# Patient Record
Sex: MALE | Race: WHITE | Employment: OTHER | URBAN - METROPOLITAN AREA
[De-identification: names, ages, dates, MRNs, and addresses within clinical notes are randomized per-mention and may not be internally consistent; named-entity substitution may affect disease eponyms.]

---

## 2018-04-29 ENCOUNTER — APPOINTMENT (OUTPATIENT)
Dept: CT IMAGING | Facility: CLINIC | Age: 83
DRG: 065 | End: 2018-04-29
Attending: EMERGENCY MEDICINE
Payer: MEDICARE

## 2018-04-29 ENCOUNTER — HOSPITAL ENCOUNTER (INPATIENT)
Facility: CLINIC | Age: 83
LOS: 4 days | Discharge: NURSING, REHAB OR LONG-TERM CARE | DRG: 065 | End: 2018-05-03
Attending: EMERGENCY MEDICINE | Admitting: INTERNAL MEDICINE
Payer: MEDICARE

## 2018-04-29 DIAGNOSIS — K59.01 SLOW TRANSIT CONSTIPATION: Primary | ICD-10-CM

## 2018-04-29 DIAGNOSIS — I63.412 CEREBRAL INFARCTION DUE TO EMBOLISM OF LEFT MIDDLE CEREBRAL ARTERY (H): ICD-10-CM

## 2018-04-29 PROBLEM — I63.9 ACUTE EMBOLIC STROKE (H): Status: ACTIVE | Noted: 2018-04-29

## 2018-04-29 LAB
ANION GAP SERPL CALCULATED.3IONS-SCNC: 7 MMOL/L (ref 3–14)
APTT PPP: 31 SEC (ref 22–37)
BASOPHILS # BLD AUTO: 0 10E9/L (ref 0–0.2)
BASOPHILS NFR BLD AUTO: 0.4 %
BUN SERPL-MCNC: 21 MG/DL (ref 7–30)
CALCIUM SERPL-MCNC: 8.9 MG/DL (ref 8.5–10.1)
CHLORIDE SERPL-SCNC: 107 MMOL/L (ref 94–109)
CO2 SERPL-SCNC: 27 MMOL/L (ref 20–32)
CREAT SERPL-MCNC: 0.95 MG/DL (ref 0.66–1.25)
DIFFERENTIAL METHOD BLD: NORMAL
EOSINOPHIL # BLD AUTO: 0.2 10E9/L (ref 0–0.7)
EOSINOPHIL NFR BLD AUTO: 3.3 %
ERYTHROCYTE [DISTWIDTH] IN BLOOD BY AUTOMATED COUNT: 14 % (ref 10–15)
GFR SERPL CREATININE-BSD FRML MDRD: 74 ML/MIN/1.7M2
GLUCOSE SERPL-MCNC: 120 MG/DL (ref 70–99)
HBA1C MFR BLD: 7 % (ref 0–6.4)
HCT VFR BLD AUTO: 43.9 % (ref 40–53)
HGB BLD-MCNC: 15.4 G/DL (ref 13.3–17.7)
IMM GRANULOCYTES # BLD: 0 10E9/L (ref 0–0.4)
IMM GRANULOCYTES NFR BLD: 0.1 %
INR PPP: 1.03 (ref 0.86–1.14)
LYMPHOCYTES # BLD AUTO: 2.4 10E9/L (ref 0.8–5.3)
LYMPHOCYTES NFR BLD AUTO: 33.9 %
MCH RBC QN AUTO: 31.4 PG (ref 26.5–33)
MCHC RBC AUTO-ENTMCNC: 35.1 G/DL (ref 31.5–36.5)
MCV RBC AUTO: 89 FL (ref 78–100)
MONOCYTES # BLD AUTO: 0.7 10E9/L (ref 0–1.3)
MONOCYTES NFR BLD AUTO: 9.4 %
NEUTROPHILS # BLD AUTO: 3.7 10E9/L (ref 1.6–8.3)
NEUTROPHILS NFR BLD AUTO: 52.9 %
NRBC # BLD AUTO: 0 10*3/UL
NRBC BLD AUTO-RTO: 0 /100
PLATELET # BLD AUTO: 162 10E9/L (ref 150–450)
POTASSIUM SERPL-SCNC: 4.4 MMOL/L (ref 3.4–5.3)
RBC # BLD AUTO: 4.91 10E12/L (ref 4.4–5.9)
SODIUM SERPL-SCNC: 141 MMOL/L (ref 133–144)
TSH SERPL DL<=0.005 MIU/L-ACNC: 2.33 MU/L (ref 0.4–4)
WBC # BLD AUTO: 7 10E9/L (ref 4–11)

## 2018-04-29 PROCEDURE — 83036 HEMOGLOBIN GLYCOSYLATED A1C: CPT | Performed by: EMERGENCY MEDICINE

## 2018-04-29 PROCEDURE — 85025 COMPLETE CBC W/AUTO DIFF WBC: CPT | Performed by: EMERGENCY MEDICINE

## 2018-04-29 PROCEDURE — 80048 BASIC METABOLIC PNL TOTAL CA: CPT | Performed by: EMERGENCY MEDICINE

## 2018-04-29 PROCEDURE — 25000128 H RX IP 250 OP 636: Performed by: INTERNAL MEDICINE

## 2018-04-29 PROCEDURE — 99223 1ST HOSP IP/OBS HIGH 75: CPT | Mod: AI | Performed by: INTERNAL MEDICINE

## 2018-04-29 PROCEDURE — 84443 ASSAY THYROID STIM HORMONE: CPT | Performed by: EMERGENCY MEDICINE

## 2018-04-29 PROCEDURE — 25000132 ZZH RX MED GY IP 250 OP 250 PS 637: Performed by: EMERGENCY MEDICINE

## 2018-04-29 PROCEDURE — 96360 HYDRATION IV INFUSION INIT: CPT | Mod: 59

## 2018-04-29 PROCEDURE — 99292 CRITICAL CARE ADDL 30 MIN: CPT

## 2018-04-29 PROCEDURE — 93005 ELECTROCARDIOGRAM TRACING: CPT

## 2018-04-29 PROCEDURE — 25000128 H RX IP 250 OP 636: Performed by: EMERGENCY MEDICINE

## 2018-04-29 PROCEDURE — 70450 CT HEAD/BRAIN W/O DYE: CPT

## 2018-04-29 PROCEDURE — 85610 PROTHROMBIN TIME: CPT | Performed by: EMERGENCY MEDICINE

## 2018-04-29 PROCEDURE — 25000125 ZZHC RX 250: Performed by: EMERGENCY MEDICINE

## 2018-04-29 PROCEDURE — 70460 CT HEAD/BRAIN W/DYE: CPT

## 2018-04-29 PROCEDURE — 99291 CRITICAL CARE FIRST HOUR: CPT | Mod: 25

## 2018-04-29 PROCEDURE — 70498 CT ANGIOGRAPHY NECK: CPT

## 2018-04-29 PROCEDURE — 12000000 ZZH R&B MED SURG/OB

## 2018-04-29 PROCEDURE — 85730 THROMBOPLASTIN TIME PARTIAL: CPT | Performed by: EMERGENCY MEDICINE

## 2018-04-29 PROCEDURE — 96361 HYDRATE IV INFUSION ADD-ON: CPT

## 2018-04-29 RX ORDER — AMOXICILLIN 250 MG
2 CAPSULE ORAL 2 TIMES DAILY PRN
Status: DISCONTINUED | OUTPATIENT
Start: 2018-04-29 | End: 2018-05-03 | Stop reason: HOSPADM

## 2018-04-29 RX ORDER — POTASSIUM CHLORIDE 7.45 MG/ML
10 INJECTION INTRAVENOUS
Status: DISCONTINUED | OUTPATIENT
Start: 2018-04-29 | End: 2018-05-03 | Stop reason: HOSPADM

## 2018-04-29 RX ORDER — ACETAMINOPHEN 325 MG/1
650 TABLET ORAL EVERY 4 HOURS PRN
Status: DISCONTINUED | OUTPATIENT
Start: 2018-04-29 | End: 2018-05-03 | Stop reason: HOSPADM

## 2018-04-29 RX ORDER — LIDOCAINE 40 MG/G
CREAM TOPICAL
Status: DISCONTINUED | OUTPATIENT
Start: 2018-04-29 | End: 2018-05-03 | Stop reason: HOSPADM

## 2018-04-29 RX ORDER — POLYETHYLENE GLYCOL 3350 17 G/17G
17 POWDER, FOR SOLUTION ORAL DAILY PRN
Status: DISCONTINUED | OUTPATIENT
Start: 2018-04-29 | End: 2018-05-03 | Stop reason: HOSPADM

## 2018-04-29 RX ORDER — ASPIRIN 300 MG/1
300 SUPPOSITORY RECTAL ONCE
Status: COMPLETED | OUTPATIENT
Start: 2018-04-29 | End: 2018-04-29

## 2018-04-29 RX ORDER — ASPIRIN 300 MG/1
300 SUPPOSITORY RECTAL DAILY
Status: DISCONTINUED | OUTPATIENT
Start: 2018-04-29 | End: 2018-05-03 | Stop reason: HOSPADM

## 2018-04-29 RX ORDER — ONDANSETRON 2 MG/ML
4 INJECTION INTRAMUSCULAR; INTRAVENOUS EVERY 6 HOURS PRN
Status: DISCONTINUED | OUTPATIENT
Start: 2018-04-29 | End: 2018-05-03 | Stop reason: HOSPADM

## 2018-04-29 RX ORDER — METOPROLOL TARTRATE 1 MG/ML
5 INJECTION, SOLUTION INTRAVENOUS EVERY 4 HOURS PRN
Status: DISCONTINUED | OUTPATIENT
Start: 2018-04-29 | End: 2018-05-03 | Stop reason: HOSPADM

## 2018-04-29 RX ORDER — POTASSIUM CL/LIDO/0.9 % NACL 10MEQ/0.1L
10 INTRAVENOUS SOLUTION, PIGGYBACK (ML) INTRAVENOUS
Status: DISCONTINUED | OUTPATIENT
Start: 2018-04-29 | End: 2018-05-03 | Stop reason: HOSPADM

## 2018-04-29 RX ORDER — PROCHLORPERAZINE 25 MG
12.5 SUPPOSITORY, RECTAL RECTAL EVERY 12 HOURS PRN
Status: DISCONTINUED | OUTPATIENT
Start: 2018-04-29 | End: 2018-05-03 | Stop reason: HOSPADM

## 2018-04-29 RX ORDER — MAGNESIUM SULFATE HEPTAHYDRATE 40 MG/ML
4 INJECTION, SOLUTION INTRAVENOUS EVERY 4 HOURS PRN
Status: DISCONTINUED | OUTPATIENT
Start: 2018-04-29 | End: 2018-05-03 | Stop reason: HOSPADM

## 2018-04-29 RX ORDER — PROCHLORPERAZINE MALEATE 5 MG
5 TABLET ORAL EVERY 6 HOURS PRN
Status: DISCONTINUED | OUTPATIENT
Start: 2018-04-29 | End: 2018-05-03 | Stop reason: HOSPADM

## 2018-04-29 RX ORDER — ONDANSETRON 4 MG/1
4 TABLET, ORALLY DISINTEGRATING ORAL EVERY 6 HOURS PRN
Status: DISCONTINUED | OUTPATIENT
Start: 2018-04-29 | End: 2018-05-03 | Stop reason: HOSPADM

## 2018-04-29 RX ORDER — METOPROLOL TARTRATE 1 MG/ML
2.5 INJECTION, SOLUTION INTRAVENOUS EVERY 6 HOURS
Status: DISCONTINUED | OUTPATIENT
Start: 2018-04-30 | End: 2018-05-01

## 2018-04-29 RX ORDER — SODIUM CHLORIDE 9 MG/ML
INJECTION, SOLUTION INTRAVENOUS CONTINUOUS
Status: DISCONTINUED | OUTPATIENT
Start: 2018-04-29 | End: 2018-05-03 | Stop reason: HOSPADM

## 2018-04-29 RX ORDER — AMOXICILLIN 250 MG
1 CAPSULE ORAL 2 TIMES DAILY PRN
Status: DISCONTINUED | OUTPATIENT
Start: 2018-04-29 | End: 2018-05-03 | Stop reason: HOSPADM

## 2018-04-29 RX ORDER — HYDRALAZINE HYDROCHLORIDE 20 MG/ML
10-20 INJECTION INTRAMUSCULAR; INTRAVENOUS
Status: DISCONTINUED | OUTPATIENT
Start: 2018-04-29 | End: 2018-05-03 | Stop reason: HOSPADM

## 2018-04-29 RX ORDER — POTASSIUM CHLORIDE 1.5 G/1.58G
20-40 POWDER, FOR SOLUTION ORAL
Status: DISCONTINUED | OUTPATIENT
Start: 2018-04-29 | End: 2018-05-03 | Stop reason: HOSPADM

## 2018-04-29 RX ORDER — NALOXONE HYDROCHLORIDE 0.4 MG/ML
.1-.4 INJECTION, SOLUTION INTRAMUSCULAR; INTRAVENOUS; SUBCUTANEOUS
Status: DISCONTINUED | OUTPATIENT
Start: 2018-04-29 | End: 2018-05-03 | Stop reason: HOSPADM

## 2018-04-29 RX ORDER — LABETALOL HYDROCHLORIDE 5 MG/ML
10-40 INJECTION, SOLUTION INTRAVENOUS EVERY 10 MIN PRN
Status: DISCONTINUED | OUTPATIENT
Start: 2018-04-29 | End: 2018-05-03 | Stop reason: HOSPADM

## 2018-04-29 RX ORDER — POTASSIUM CHLORIDE 29.8 MG/ML
20 INJECTION INTRAVENOUS
Status: DISCONTINUED | OUTPATIENT
Start: 2018-04-29 | End: 2018-05-03 | Stop reason: HOSPADM

## 2018-04-29 RX ORDER — POTASSIUM CHLORIDE 1500 MG/1
20-40 TABLET, EXTENDED RELEASE ORAL
Status: DISCONTINUED | OUTPATIENT
Start: 2018-04-29 | End: 2018-05-03 | Stop reason: HOSPADM

## 2018-04-29 RX ORDER — IOPAMIDOL 755 MG/ML
120 INJECTION, SOLUTION INTRAVASCULAR ONCE
Status: COMPLETED | OUTPATIENT
Start: 2018-04-29 | End: 2018-04-29

## 2018-04-29 RX ORDER — BISACODYL 10 MG
10 SUPPOSITORY, RECTAL RECTAL DAILY PRN
Status: DISCONTINUED | OUTPATIENT
Start: 2018-04-29 | End: 2018-05-03 | Stop reason: HOSPADM

## 2018-04-29 RX ADMIN — SODIUM CHLORIDE 1000 ML: 9 INJECTION, SOLUTION INTRAVENOUS at 18:26

## 2018-04-29 RX ADMIN — IOPAMIDOL 120 ML: 755 INJECTION, SOLUTION INTRAVENOUS at 18:40

## 2018-04-29 RX ADMIN — SODIUM CHLORIDE: 9 INJECTION, SOLUTION INTRAVENOUS at 21:41

## 2018-04-29 RX ADMIN — ASPIRIN 300 MG: 300 SUPPOSITORY RECTAL at 19:41

## 2018-04-29 RX ADMIN — SODIUM CHLORIDE 100 ML: 9 INJECTION, SOLUTION INTRAVENOUS at 18:40

## 2018-04-29 ASSESSMENT — VISUAL ACUITY: OU: NORMAL ACUITY

## 2018-04-29 NOTE — ED NOTES
Bed: [facility]  Expected date:   Expected time:   Means of arrival:   Comments:  Kely 1 Code stroke 89 male

## 2018-04-29 NOTE — ED PROVIDER NOTES
"  History     Chief Complaint:  Stroke Symptoms    HPI - History is limited due to patient's mental status and is supplemented with EMS report and family    Angelito Castellanos is a 89 year old anticoagulated male with a pacemaker who presents with stroke symptoms. The patient was last seen well at 11:00 today. This afternoon, the patient's daughter's boyfriend found the patient in a chair with left sided weakness, slurred speech, facial droop, and a drop in mental status. EMS was contacted and reports a sugar of 101, systolic of 157 and urine incontinence. It is unknown if he fell and EMS denies signs of trauma. Here in the ED the patient complains of headache as well as his continued stroke symptoms. He states that he is normally seen at [facility]. At baseline, the patient is active with no deficits. He denies prior history of stroke.     Allergies:  No Known Drug Allergies    Medications:    EMS reports anticoagulant    Past Medical History:    Atrial fibrillation  Hard of hearing    Past Surgical History:    Pacemaker  Appendectomy    Family History:    The patient denies any relevant family medical history.    Social History:  The patient was accompanied to the ED by EMS and family at bedside  Smoking Status: former smoker  Smokeless Tobacco: none  Alcohol Use: yes    Review of Systems   Unable to perform ROS: Mental status change     Physical Exam   First Vitals:  BP: (!) 178/110  Pulse: 94  Heart Rate: 90  Temp: 98.3  F (36.8  C)  Resp: 18  Height: 177.8 cm (5' 10\")  Weight: 71.4 kg (157 lb 6.5 oz)  SpO2: 97 %      Physical Exam  GENERAL: well developed, pleasant   HEAD: atraumatic  EYES: pupils reactive, extraocular muscles intact, conjunctivae normal  ENT:  mucus membranes moist  NECK:  trachea midline, normal range of motion  RESPIRATORY: no tachypnea, breath sounds clear to auscultation   CVS: normal S1/S2, no murmurs, intact distal pulses  ABDOMEN: soft, nontender, nondistention  MUSCULOSKELETAL: no " deformities  SKIN: warm and dry, no acute rashes or ulceration  NEURO: GCS 15, loss of nasolabial fold on the left, alert and oriented x2. Paralysis left arm. Slurred speech. Right gaze preference. Stiffness to the right arm. Incontinent of Urine. No seizure activity.  PSYCH:  Mood/affect normal        Emergency Department Course   Imaging:  Radiographic findings were communicated with the patient who voiced understanding of the findings.  CT Head without contrast:   1. Focal area of gliosis in the right frontal operculum with loss of  gray-white matter differentiation. This has the appearance of a more  chronic infarct although an acute area of ischemia cannot be excluded.  This could be better evaluated with MRI if the patient is able.  2. Lacunar infarct within the left basal ganglia which may be acute or  chronic.  3. Diffuse parenchymal volume loss and white matter changes likely due  to chronic microvascular ischemic disease. As per radiology.    CT Head with contrast:   Large area of ischemia involving the right MCA territory  also with marked decreased cerebral blood volume suggesting core  infarct involving at least greater than 50% of the area of ischemia. As per radiology.      CTA Angiogram Head Neck:  1. Focal vascular cutoff of the proximal right M1 segment concerning  for embolism. Relatively poor collaterization of the distal right MCA  vessels particularly anteriorly.  2. Atherosclerotic disease at the right carotid bifurcation resulting  in 52% stenosis by NASCET criteria. Mild atherosclerotic disease at  the left carotid bifurcation without significant stenosis.  3. Patent arteries in the neck without evidence of dissection. Mild  stenosis at the origin of the left subclavian artery.  4. 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 As per radiology.     Laboratory:  BMP: Glucose 120 (H), o/w WNL (Creatinine: 0.95)  CBC: AWNL. (WBC 7.0, HGB 15.4, )    INR: 1.03  Partial thromboplastin time: 31    Interventions:  1826 NS, 1 L, IV   1941 aspirin suppository, 300 mg, Rectal    Emergency Department Course:  Nursing notes and vitals reviewed. I performed an exam of the patient as documented above.     IV inserted. Medicine administered as documented above. Blood drawn. This was sent to the lab for further testing, results above.    The patient was sent for CT head and angiogram while in the emergency department, findings above.     1820  EMS gives report  1821  Interview begins  1822  I begin my physical exam  1824  I attempt electronic chart review  1826  BP recorded at 178/110  1827  patient is taken to CT    1827  I spoke with Dr. Daniel Kennedy, Neurology, regarding the patient and their presentation today.   1841  I spoke with radiology regarding the CT results  1843  I spoke with family and Dr. Kennedy via tele stroke in the stabilization room.  1844  I spoke with radiology  1847  The patient returns to stabilization room, Dr. Kennedy begins his assessment.   1901  I spoke with Dr. Kennedy, neurology regarding the patient    1924  I consulted with Dr. Zaidi of the hospitalist services. They are in agreement to accept the patient for admission.    Findings and plan explained to the Patient who consents to admission. Discussed the patient with Dr. Zaidi, who will admit the patient to an inpatient neuro bed for further monitoring, evaluation, and treatment.    Impression & Plan          CMS Diagnoses: The patient has stroke symptoms:           ED Stroke specific documentation           NIHSS PDF          Protocol PDF     Patient last known well time: 1100  ED Provider first to bedside at: 1823  CT Results received at: 1900  Patient was not treated with TPA due to the following reason(s):  Out of the treatment time window    National Institutes of Health Stroke Scale (Baseline)  Time Performed: 1825      Score    Level of consciousness: (0)   Alert, keenly  responsive    LOC questions: (0)   Answers both questions correctly    LOC commands: (0)   Performs both tasks correctly    Best gaze: (1)   Partial gaze palsy    Visual: (0)   No visual loss    Facial palsy: (1)   Minor paralysis (flat nasolabial fold, smile asymmetry)    Motor arm (left): (2)   Some effort against gravity    Motor arm (right): (0)   No drift    Motor leg (left): (2)   Some effort against gravity    Motor leg (right): (1)   Drift    Limb ataxia: (1)   Present in one limb    Sensory: (0)   Normal- no sensory loss    Best language: (1)   Mild to moderate aphasia    Dysarthria: (1)   Mild to moderate dysarthria    Extinction and inattention: (1)   Visual, tacile, auditory, spatial, person inattention        Total Score:  11        Stroke Mimics were considered (including migraine headache, seizure disorder, hypoglycemia (or hyperglycemia), head or spinal trauma, CNS infection, Toxin ingestion and shock state (e.g. sepsis) .      Medical Decision Making:  The patient presents with stroke symptoms. Code stroke was called. The patient's neuro exam shows right gaze preference, initially complete paralysis of the left arm, now weakness but able to get it off the gurney, slurred speech and facial asymmetry. I spoke with the neurologist, the area of infarct is quite large and is already showing changes on CT so the decision was made that the patient was not a candidate for IV TPA given length of time since his symptoms as well as not an interventional candidate given the area of changes already seen on CT. The patient was given a rectal aspirin. The patient is showing gradual improvement of symptoms.  Given advanced age, changes already being seen on CT, large area affected already, and patient is starting to have improvements of symptoms, TPA and IR were not deployed, as the risk to benefit ratio was felt to be too high.    Critical Care time:  was 55 minutes for this patient excluding  procedures.    Diagnosis:    ICD-10-CM    1. 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) I63.412 Hemoglobin A1c     Hemoglobin A1c     TSH with free T4 reflex     TSH with free T4 reflex     CANCELED: Hemoglobin A1c     CANCELED: TSH with free T4 reflex       Disposition:  Admitted to the care of Dr. Zaidi and colleagues for further evaluation and treatment.     Terence PATINO, am serving as a scribe on 4/29/2018 at 6:39 PM to personally document services performed by Ronnie Barajas MD based on my observations and the provider's statements to me.       Terence Mckinney  4/29/2018    EMERGENCY DEPARTMENT       Ronnie Barajas MD  04/29/18 [id number]

## 2018-04-29 NOTE — CONSULTS
[facility]      Stroke Code Note    Angelito Castellanos is a 89 year old male.    A stroke code was activated due to left sided weakness and forced gaze deviation.  .    Stroke Code Data  Time notified 1823   Time of first response 1827   Time tele service began 1832   Time tele service ended 1859   Onset of symptoms unknown   Last known normal 1100   Time head CT read by me 1859   Was stroke code de-escalated? No       Telestroke Service Details  Type of service telemedicine diagnostic assessment of acute neurological changes   Reason telemedicine is appropriate patient required immediate assistance from specialist   Mode of transmission secure interactive audio and video communication per Alyce   Originating site (patient location) [facility]    Distant site (provider location) [facility]       TPA Treatment  Not given due to outside the time window.    Stroke Scales      National Institutes of Health Stroke Scale (at presentation)   NIHSS done at:  time patient seen      Score    Level of consciousness:  (0)   Alert, keenly responsive    LOC questions:  (0)   Answers both questions correctly    LOC commands:  (1)   Performs one task correctly    Best gaze:  (2)   Forced deviation    Visual:  (2)   Complete hemianopia    Facial palsy:  (2)   Partial paralysis (total/near total of lower face)    Motor arm (left):  (2)   Some effort against gravity    Motor arm (right):  (0)   No drift    Motor leg (left):  (2)   Some effort against gravity    Motor leg (right):  (2)   Some effort against gravity    Limb ataxia:  (0)   Absent    Sensory:  (0)   Normal- no sensory loss    Best language:  (0)   Normal- no aphasia    Dysarthria:  (1)   Mild to moderate dysarthria    Extinction and inattention:  (2)   Profound estefani-inattention / extinction > one modality        NIHSS Total Score:  16        Impression:  89-year-old man presenting to the hospital via EMS from home.  He was last  seen normal between 1030 and 11 AM this morning after breakfast.  He was then found this evening down in his room with an inability to use his left side with a forced right gaze deviation.  There was suspicion for stroke and a stroke code was called.  He was taken to the CT scanner where a CTA/CT/CTP was performed.  This demonstrated a right M1 cutoff and matched perfusion deficit within the right hemisphere.  There was some area of penumbra, however the area of matched deficit had a malignant profile.  Cerebral angiogram with thrombectomy was not offered based upon the imaging findings.  The patient started to improve on reexamination with the ability to cross midline with his eyes and was able then to lift up his left arm and left hand was more dexterous.  He was found to have a 70% stenosis of his right internal carotid artery.  He does have an history of atrial fibrillation and is not on anticoagulation.  He has a pacemaker which would preclude an MRI.  Given the improvement in his physical exam I would anticipate that there has been some recanalization of the previously seen lesion.    Recommendations  Acute Ischemic Stroke (without tPA) Recommendations  - Neurochecks  - Permissive HTN; labetalol PRN for SBP > 220  - Euthermia, Euglycemia  - Daily aspirin for secondary stroke prevention  - Statin  - TTE with Bubble Study  - Telemetry, EKG  - Bedside Glucose Monitoring  - A1c, Lipid Panel, Troponin x 3  - PT/OT/SLP  - Stroke Education      Daniel Kennedy MD  Vascular Neurology/Neurocritical Care  Text Page - [phone]    I spent 40 minutes of critical care time supervising the patient's code stroke activation.   I personally reviewed all relevant labs and neuroimaging.

## 2018-04-30 ENCOUNTER — APPOINTMENT (OUTPATIENT)
Dept: SPEECH THERAPY | Facility: CLINIC | Age: 83
DRG: 065 | End: 2018-04-30
Payer: MEDICARE

## 2018-04-30 ENCOUNTER — APPOINTMENT (OUTPATIENT)
Dept: CARDIOLOGY | Facility: CLINIC | Age: 83
DRG: 065 | End: 2018-04-30
Attending: INTERNAL MEDICINE
Payer: MEDICARE

## 2018-04-30 ENCOUNTER — APPOINTMENT (OUTPATIENT)
Dept: SPEECH THERAPY | Facility: CLINIC | Age: 83
DRG: 065 | End: 2018-04-30
Attending: INTERNAL MEDICINE
Payer: MEDICARE

## 2018-04-30 ENCOUNTER — APPOINTMENT (OUTPATIENT)
Dept: OCCUPATIONAL THERAPY | Facility: CLINIC | Age: 83
DRG: 065 | End: 2018-04-30
Attending: INTERNAL MEDICINE
Payer: MEDICARE

## 2018-04-30 ENCOUNTER — APPOINTMENT (OUTPATIENT)
Dept: CT IMAGING | Facility: CLINIC | Age: 83
DRG: 065 | End: 2018-04-30
Attending: PSYCHIATRY & NEUROLOGY
Payer: MEDICARE

## 2018-04-30 LAB
ANION GAP SERPL CALCULATED.3IONS-SCNC: 7 MMOL/L (ref 3–14)
BUN SERPL-MCNC: 17 MG/DL (ref 7–30)
CALCIUM SERPL-MCNC: 8.2 MG/DL (ref 8.5–10.1)
CHLORIDE SERPL-SCNC: 109 MMOL/L (ref 94–109)
CHOLEST SERPL-MCNC: 174 MG/DL
CO2 SERPL-SCNC: 26 MMOL/L (ref 20–32)
CREAT SERPL-MCNC: 1.05 MG/DL (ref 0.66–1.25)
ERYTHROCYTE [DISTWIDTH] IN BLOOD BY AUTOMATED COUNT: 14.1 % (ref 10–15)
GFR SERPL CREATININE-BSD FRML MDRD: 66 ML/MIN/1.7M2
GLUCOSE BLDC GLUCOMTR-MCNC: 119 MG/DL (ref 70–99)
GLUCOSE SERPL-MCNC: 121 MG/DL (ref 70–99)
HCT VFR BLD AUTO: 41.6 % (ref 40–53)
HDLC SERPL-MCNC: 35 MG/DL
HGB BLD-MCNC: 14.4 G/DL (ref 13.3–17.7)
INTERPRETATION ECG - MUSE: NORMAL
LDLC SERPL CALC-MCNC: 116 MG/DL
MCH RBC QN AUTO: 30.8 PG (ref 26.5–33)
MCHC RBC AUTO-ENTMCNC: 34.6 G/DL (ref 31.5–36.5)
MCV RBC AUTO: 89 FL (ref 78–100)
NONHDLC SERPL-MCNC: 139 MG/DL
PLATELET # BLD AUTO: 154 10E9/L (ref 150–450)
POTASSIUM SERPL-SCNC: 4 MMOL/L (ref 3.4–5.3)
RBC # BLD AUTO: 4.68 10E12/L (ref 4.4–5.9)
SODIUM SERPL-SCNC: 142 MMOL/L (ref 133–144)
TRIGL SERPL-MCNC: 116 MG/DL
TROPONIN I SERPL-MCNC: <0.015 UG/L (ref 0–0.04)
WBC # BLD AUTO: 7.7 10E9/L (ref 4–11)

## 2018-04-30 PROCEDURE — 93306 TTE W/DOPPLER COMPLETE: CPT | Mod: 26 | Performed by: INTERNAL MEDICINE

## 2018-04-30 PROCEDURE — 99233 SBSQ HOSP IP/OBS HIGH 50: CPT | Performed by: PSYCHIATRY & NEUROLOGY

## 2018-04-30 PROCEDURE — 70450 CT HEAD/BRAIN W/O DYE: CPT

## 2018-04-30 PROCEDURE — 92526 ORAL FUNCTION THERAPY: CPT | Mod: GN | Performed by: SPEECH-LANGUAGE PATHOLOGIST

## 2018-04-30 PROCEDURE — A9270 NON-COVERED ITEM OR SERVICE: HCPCS | Mod: GY | Performed by: INTERNAL MEDICINE

## 2018-04-30 PROCEDURE — 36415 COLL VENOUS BLD VENIPUNCTURE: CPT | Performed by: INTERNAL MEDICINE

## 2018-04-30 PROCEDURE — 25000128 H RX IP 250 OP 636: Performed by: INTERNAL MEDICINE

## 2018-04-30 PROCEDURE — 25000125 ZZHC RX 250: Performed by: INTERNAL MEDICINE

## 2018-04-30 PROCEDURE — 92610 EVALUATE SWALLOWING FUNCTION: CPT | Mod: GN | Performed by: SPEECH-LANGUAGE PATHOLOGIST

## 2018-04-30 PROCEDURE — 00000146 ZZHCL STATISTIC GLUCOSE BY METER IP

## 2018-04-30 PROCEDURE — 25000132 ZZH RX MED GY IP 250 OP 250 PS 637: Mod: GY | Performed by: INTERNAL MEDICINE

## 2018-04-30 PROCEDURE — 25500064 ZZH RX 255 OP 636: Performed by: INTERNAL MEDICINE

## 2018-04-30 PROCEDURE — 12000000 ZZH R&B MED SURG/OB

## 2018-04-30 PROCEDURE — 93306 TTE W/DOPPLER COMPLETE: CPT

## 2018-04-30 PROCEDURE — 80061 LIPID PANEL: CPT | Performed by: INTERNAL MEDICINE

## 2018-04-30 PROCEDURE — 80048 BASIC METABOLIC PNL TOTAL CA: CPT | Performed by: INTERNAL MEDICINE

## 2018-04-30 PROCEDURE — 40000133 ZZH STATISTIC OT WARD VISIT

## 2018-04-30 PROCEDURE — 85027 COMPLETE CBC AUTOMATED: CPT | Performed by: INTERNAL MEDICINE

## 2018-04-30 PROCEDURE — 40000225 ZZH STATISTIC SLP WARD VISIT: Performed by: SPEECH-LANGUAGE PATHOLOGIST

## 2018-04-30 PROCEDURE — 97530 THERAPEUTIC ACTIVITIES: CPT | Mod: GO

## 2018-04-30 PROCEDURE — 97165 OT EVAL LOW COMPLEX 30 MIN: CPT | Mod: GO

## 2018-04-30 PROCEDURE — 99232 SBSQ HOSP IP/OBS MODERATE 35: CPT | Performed by: INTERNAL MEDICINE

## 2018-04-30 PROCEDURE — 84484 ASSAY OF TROPONIN QUANT: CPT | Performed by: INTERNAL MEDICINE

## 2018-04-30 RX ADMIN — METOPROLOL TARTRATE 2.5 MG: 5 INJECTION INTRAVENOUS at 05:53

## 2018-04-30 RX ADMIN — SODIUM CHLORIDE: 9 INJECTION, SOLUTION INTRAVENOUS at 11:38

## 2018-04-30 RX ADMIN — HUMAN ALBUMIN MICROSPHERES AND PERFLUTREN 9 ML: 10; .22 INJECTION, SOLUTION INTRAVENOUS at 11:11

## 2018-04-30 RX ADMIN — ACETAMINOPHEN 650 MG: 325 TABLET ORAL at 20:39

## 2018-04-30 RX ADMIN — METOPROLOL TARTRATE 2.5 MG: 5 INJECTION INTRAVENOUS at 01:11

## 2018-04-30 RX ADMIN — METOPROLOL TARTRATE 2.5 MG: 5 INJECTION INTRAVENOUS at 18:36

## 2018-04-30 RX ADMIN — ASPIRIN 300 MG: 300 SUPPOSITORY RECTAL at 09:33

## 2018-04-30 RX ADMIN — METOPROLOL TARTRATE 2.5 MG: 5 INJECTION INTRAVENOUS at 12:43

## 2018-04-30 ASSESSMENT — ACTIVITIES OF DAILY LIVING (ADL)
ADLS_ACUITY_SCORE: 17
ADLS_ACUITY_SCORE: 17
ADLS_ACUITY_SCORE: 19
WHICH_OF_THE_ABOVE_FUNCTIONAL_RISKS_HAD_A_RECENT_ONSET_OR_CHANGE?: AMBULATION;TRANSFERRING;TOILETING;BATHING;DRESSING;EATING;SWALLOWING;COGNITION;COMMUNICATION/SPEECH;FALL HISTORY
ADLS_ACUITY_SCORE: 19
ADLS_ACUITY_SCORE: 17
ADLS_ACUITY_SCORE: 18

## 2018-04-30 ASSESSMENT — VISUAL ACUITY
OU: BASELINE

## 2018-04-30 NOTE — ED NOTES
Assumed care of pt. Report received from ALFIE Bowie. Pt incontinent of urine. Changed pt (total care) with assistance of 2 RN's.

## 2018-04-30 NOTE — PROVIDER NOTIFICATION
"Call placed to Dr. Mathis: \"Patient has an Adapta pacemaker which is not compatable with MRI per MRI tech [facility].  Orders have been D/C'd, thanks!\"  "

## 2018-04-30 NOTE — PROGRESS NOTES
04/30/18 0831   General Information   Onset Date 04/29/18   Start of Care Date 04/30/18   Referring Physician Dr. Gabriela Zaidi   Patient Profile Review/OT: Additional Occupational Profile Info See Profile for full history and prior level of function   Patient/Family Goals Statement Did not state.  Agreed to POC goal.   Swallowing Evaluation Bedside swallow evaluation   Behaviorial Observations Alert;Confused;Distractible;Initiation problems  (word retrieval delays, not following all commands)   Mode of current nutrition NPO   Respiratory Status O2 Supply   Type of O2 supply Nasal cannula  (2L)   Comments Per MD note: Angelito Castellanos is a 89 year old male with hx of chronic a-fib s/p PPM and not on anticoagulation who presents with slurred speech, left facial droop, and left sided weakness, and is being admitted for acute right MCA stroke.    Clinical Swallow Evaluation   Oral Musculature (not following consistently, ? oral apraxia)   Dentition present and adequate  (missing a few teeth)   Secretion Management (small amount of mucus on hard palate)   Oral Labial Strength and Mobility impaired retraction;impaired pursing  (mild deficits on left)   Lingual Strength and Mobility impaired protrusion  (slight pull to left)   Laryngeal Function Throat clear;Cough;Swallow;Voicing initiated;Dry swallow palpated  (inconsistent execution on command)   Clinical Swallow Eval: Thin Liquid Texture Trial   Mode of Presentation, Thin Liquids fed by clinician;spoon   Volume of Liquid or Food Presented ice chips x 3   Oral Phase of Swallow Premature pharyngeal entry   Pharyngeal Phase of Swallow reduction in laryngeal movement  (delay)   Diagnostic Statement decreased awareness, cues needed to swallow at times   Clinical Swallow Eval: Nectar Thick Liquid Texture Trial   Mode of Presentation, Nectar spoon   Volume of Nectar Presented tsp x 1   Oral Phase, Nectar Premature pharyngeal entry   Pharyngeal Phase, Nectar reduction in  laryngeal movement  (delay)   Diagnostic Statement decreased awareness, cues needed to swallow    Clinical Swallow Eval: Honey Thick Liquid Texture Trial   Mode of Presentation, Honey spoon   Volume of Honey Presented tsps x 5   Oral Phase, Honey Premature pharyngeal entry   Pharyngeal Phase, Honey reduction in laryngeal movement  (delay)   Diagnostic Statement decreased awareness, cues needed to swallow at times   Swallow Compensations   Swallow Compensations Pacing;Reduce amounts;Effortful swallow   Esophageal Phase of Swallow   Patient reports or presents with symptoms of esophageal dysphagia No   Swallow Eval: Clinical Impressions   Skilled Criteria for Therapy Intervention Skilled criteria met.  Treatment indicated.   Functional Assessment Scale (FAS) 2   Treatment Diagnosis moderate-severe oral-pharyngeal dysphagia   Diet texture recommendations NPO  (except crushed meds and 1-10 tsps of honey thick liquids)   Recommended Feeding/Eating Techniques (see below)   Therapy Frequency daily   Predicted Duration of Therapy Intervention (days/wks) 1 week   Anticipated Discharge Disposition inpatient rehabilitation facility   Risks and Benefits of Treatment have been explained. Yes   Patient, family and/or staff in agreement with Plan of Care Yes   Clinical Impression Comments Patient presents with moderate-severe oral-pharyngeal dysphagia characterized by delayed swallows with mild decreased elevation and apraxia/poor oral and swallow awareness.  Patient required cues to swallow due to poor awareness.  Unable to rule out silent aspiration at bedside.  Recommend NPO status except for the following with nursing supervision: crushed meds and 1-10 tsps of honey thick, verify/cue swallows, hold if aspiration signs observed.  Plan to continue Tx this pm/tomorrow am as able with ongoing assessment of swallow function and safety for increased po intake.   Total Evaluation Time   Total Evaluation Time (Minutes) 20

## 2018-04-30 NOTE — ED NOTES
"EKG being done. Pt awake and alert. Pt states \"I know I'm having a stroke\". Pt's wet pants removed.  "

## 2018-04-30 NOTE — PROVIDER NOTIFICATION
Called Dr. Zaidi to verify stepdown status, Dr. Zaidi said ok for neuros to be q4h, will change orders to reflect this.

## 2018-04-30 NOTE — PROGRESS NOTES
"   04/30/18 1006   Quick Adds   Type of Visit Initial Occupational Therapy Evaluation   Living Environment   Lives With child(jeanette), adult   Living Arrangements house   Home Accessibility grab bars present (bathtub);tub/shower is not walk in  (hasn't installed grab bar yet)   Number of Stairs to Enter Home 1   Number of Stairs Within Home 7   Living Environment Comment Lives with one daughter and other visits several times/week. Bedroom and bath on second level.    Functional Level Prior   Ambulation 0-->independent   Transferring 0-->independent   Toileting 0-->independent   Bathing 0-->independent   Dressing 0-->independent   Eating 0-->independent   Prior Functional Level Comment I with meds. Patient makes eggs, oatmeal for example otherwise heats up food.   General Information   Onset of Illness/Injury or Date of Surgery - Date 04/29/18   Referring Physician Dejuan   Patient/Family Goals Statement Go home.   Additional Occupational Profile Info/Pertinent History of Current Problem Admitted with L sided weakness and slurred speech. Dx with R MCA CVI.    Precautions/Limitations fall precautions   Cognitive Status Examination   Orientation person   Level of Consciousness alert   Able to Follow Commands mild impairment   Personal Safety (Cognitive) at risk behaviors demonstrated  (decreased insight into deficits)   Cognitive Comment Thought he was at [facility], knew \"next month is May\", + year, -day and date.   Visual Perception   Visual Perception Comments Able to read white board to L, items on R. Denies deficits. Difficult to assess due to [place], decreased command following at times.   Sensory Examination   Sensory Quick Adds No deficits were identified   Pain Assessment   Patient Currently in Pain No   Range of Motion (ROM)   ROM Comment R shoulder limited to ~90* flexion due to h/o injury. LUE WNL with AROM.   Strength   Strength Comments + drift LUE. Unable to assess strength as patient having difficulty following " the commands for MMT.    Mobility   Bed Mobility Comments MOD A   Transfer Skill: Bed to Chair/Chair to Bed   Level of [place]: Bed to Chair unable to perform   Transfer Skill: Sit to Stand   Level of [place]: Sit/Stand unable to perform   Balance   Balance Comments CG to MOD A EOB.    Lower Body Dressing   Level of [place]: Dress Lower Body dependent (less than 25% patients effort)   Activities of Daily Living Analysis   Impairments Contributing to Impaired Activities of Daily Living balance impaired;cognition impaired;strength decreased   General Therapy Interventions   Planned Therapy Interventions ADL retraining;cognition;transfer training   Clinical Impression   Criteria for Skilled Therapeutic Interventions Met yes, treatment indicated   OT Diagnosis Decreased ADL   Influenced by the following impairments ?L side inattention, balance, confusion.   Assessment of Occupational Performance 3-5 Performance Deficits   Identified Performance Deficits dressing, bathing, transfers, grooming, IADL   Clinical Decision Making (Complexity) Moderate complexity   Therapy Frequency daily   Predicted Duration of Therapy Intervention (days/wks) 1 week   Anticipated Discharge Disposition Acute Rehabilitation Facility   Risks and Benefits of Treatment have been explained. Yes   Patient, Family & other staff in agreement with plan of care Yes   Total Evaluation Time   Total Evaluation Time (Minutes) 15

## 2018-04-30 NOTE — PROGRESS NOTES
"Care Coordination:    Per therapy notes, ARU is recommended.  Referral was sent to ARU via [facility] on 4/30/18.  Provider notes from 4/29 anticipated discharge, \"2-3 days, anticipate ARU/TCU.\"      Chio Mercado RN, BSN  [facility] Care Coordinator   Mobile Phone: [phone]    "

## 2018-04-30 NOTE — PLAN OF CARE
Problem: Patient Care Overview  Goal: Plan of Care/Patient Progress Review  PT: PT orders received, chart reviewed, eval attempted. Pt admitted with a R MCA infarct. Busy with ECHO at this time.

## 2018-04-30 NOTE — PLAN OF CARE
Problem: Patient Care Overview  Goal: Plan of Care/Patient Progress Review    Discharge Planner SLP   Patient plan for discharge: Did not state  Current status: Swallow Tx with ongoing assessment was provided this pm.  Feeding assist and mod-max cues were given to swallow honey thick liquid trials with no overt signs of aspiration.  Poor awareness of oral function/swallow reflex, delayed swallows, and decreased elevation were observed.  Absent swallow noted to thin liquid trial.  Recommend caution with a honey thick clear liquid diet, 1:1 supervision/feeding assist and cues to use the following: sit at 90 degrees, liquids by spoon, verify/cue swallows, slow rate, crush meds and give with honey thick, hold po if aspiration signs are noted/respiratory status declines/unable to verify swallows.  Will continue swallow Tx with ongoing assessment on 5/1 as able.  Barriers to return to prior living situation: Level of assist, confusion  Recommendations for discharge: ARU with SLP services  Rationale for recommendations: SLP swallow Tx to maximize safety for a least restrictive diet; cognitive-linguistic eval and Tx       Entered by: Aliza Leos 04/30/2018 3:52 PM

## 2018-04-30 NOTE — H&P
"[facility]    History and Physical  Hospitalist       Date of Admission:  4/29/2018  Date of Service (when I saw the patient): 04/29/18    Assessment & Plan   Angelito Castellanos is a 89 year old male with hx of chronic a-fib s/p PPM and not on anticoagulation who presents with slurred speech, left facial droop, and left sided weakness, and is being admitted for acute right MCA stroke.     Acute right MCA embolic stroke  Has chronic atrial fibrillation and declined anticoagulation in the past. Initial head CT showed stroke involving the right frontal operculum as well as lacunar infarct within the left basal ganglia of unclear chronicity. CTA head/neck showed focal cutoff of the proximal right M1 segment concerning for embolism, atherosclerosis of the carotid arteries, 2mm aneurysm involving the right ICA, and mild stenosis of the left subclavian artery. Neurology did not feel patient was a candidate for thrombectomy. He continues to have mildly slurred speech, facial droop, and left-sided weakness, but overall this has improved compared to initial onset.   - Aspirin 325 PO/325 [place] daily for now  - Permissive hypertension, IV labetalol and hydralazine available for SBP>220  - Brain MRI ordered  - TTE/bubble study ordered  - Serial troponins, lipid panel, A1c, TSH ordered  - PT/OT/SLP  - Neurology following    Chronic atrial fibrillation s/p PPM  [PTA: metoprolol 25 mg BID] As noted above, he has declined anticoagulation in the past stating that he was offered \"rat poison.\"  - Hold metoprolol while NPO  - On metoprolol 2.5 mg IV q6h while NPO    History of pre-diabetes  - A1c ordered    FEN: NPO, NS at 75 ml/h  DVT Prophylaxis: Pneumatic Compression Devices  Code Status: DNR/DNI, discussed with patient and family    Disposition: Expected discharge once cleared by Neurology in the next 2-3 days, anticipate ARU/TCU    Gabriela Zaidi    Primary Care Physician   Gerardo Adhikari    Chief Complaint   Slurred " "speech, left facial droop, left-sided weakness    History is obtained from the patient, his family, and medical records    History of Present Illness   Angelito Castellanos is a 89 year old male with hx of chronic a-fib s/p PPM and not on anticoagulation who presents with slurred speech, left facial droop, and left sided weakness. History if obtained from the patient and his family who are at bedside. The patient lives with his daughter and her boyfriend. He was last seen normal around 10:30-11a this morning. His daughter left for a few hours, and when her boyfriend arrived at the house several hours later, he noticed that the patient was slumped over in a chair with apparent facial droop, left-sided weakness, and slurred speech. EMS was activated at that time.     In the ED, initial CT head showed evidence of stroke involving the right frontal operculum as well as lacunar infarct within the left basal ganglia of unclear chronicity. CTA head/neck showed focal cutoff of the proximal right M1 segment concerning for embolism, atherosclerosis of the carotid arteries. 2mm aneurysm involving the right ICA, and mild stenosis of the left subclavian artery. Code stroke was activated. Neurology did not feel a thrombectomy was warranted. The patient continues to have mildly slurred speech, facial droop, and left-sided weakness, but overall this has improved compared to initial onset. The patient offers no other concerns. He was at his usual state of health until today. He denies fevers/chills or recent illnesses. He denies cp/sob, dizziness/lightheadedness, or nausea. He ambulates independently, denies weakness, and denies recent falls. He has chronic atrial fibrillation and has previously declined anticoagulation because \"they wanted to give me rat poison.\"     Past Medical History    I have reviewed this patient's medical history and updated the medical record  Past Medical History:   Diagnosis Date     Chronic atrial fibrillation " (H)      [place] (hard of hearing)      Prediabetes        Past Surgical History   I have reviewed this patient's surgical history and updated the medical record  Past Surgical History:   Procedure Laterality Date     APPENDECTOMY       CARDIAC SURGERY      pacemaker     NASAL/SINUS POLYPECTOMY         Prior to Admission Medications   None     Allergies   No Known Allergies    Social History   Remote history of smoking. He binge drinks occasionally. He lives with his daughter and has otherwise been independent of all cares. He has been driving until this hospitalization    Family History   I have reviewed this patient's family history  Family History   Problem Relation Age of Onset     Coronary Artery Disease Father      MI in his 60s     CEREBROVASCULAR DISEASE Other      Multiple relatives with history of stroke       Review of Systems   The 10 point Review of Systems is negative other than noted in the HPI    Physical Exam   Temp: 98.3  F (36.8  C) Temp src: Temporal BP: 144/83 Pulse: 94 Heart Rate: 87 Resp: 10 SpO2: 95 % O2 Device: None (Room air)    Vital Signs with Ranges  Temp:  [98.3  F (36.8  C)] 98.3  F (36.8  C)  Pulse:  [94] 94  Heart Rate:  [] 87  Resp:  [10-26] 10  BP: (144-186)/() 144/83  SpO2:  [95 %-98 %] 95 %  157 lbs 6.54 oz    Constitutional: Appears comfortable, NAD  HEENT: NC/AT, sclera white, conjunctiva clear, EOMI, MMM  Respiratory: Breathing non-labored. Lungs CTAB - no wheezes/crackles/rhonchi  Cardiovascular: Heart RRR, no m/r/g. No pedal edema.   GI: +BS. Abd soft/NT  Lymph/Hematologic: No cervical LAD  Genitourinary: Not examined  Skin: Warm and dry. No rash.  Musculoskeletal: Normal muscle bulk and tone  Neurologic: Alert and appropriate. Mildly slurred speech and left facial droop. SIMPSON, but with apparent left-sided weakness  Psychiatric: Calm and cooperative    Data   Data reviewed today:  I personally reviewed the CT head showing stroke involving the right frontal operculum  as well as lacunar infarct within the left basal ganglia of unclear chronicity. CTA head/neck showed focal cutoff of the proximal right M1 segment concerning for embolism, atherosclerosis of the carotid arteries. 2mm aneurysm involving the right ICA, and mild stenosis of the left subclavian artery.    Recent Labs  Lab 04/29/18  1822   WBC 7.0   HGB 15.4   MCV 89      INR 1.03      POTASSIUM 4.4   CHLORIDE 107   CO2 27   BUN 21   CR 0.95   ANIONGAP 7   EDMUNDO 8.9   *       Recent Results (from the past 24 hour(s))   CT Head w/o Contrast    Narrative    CT SCAN OF THE HEAD WITHOUT CONTRAST   4/29/2018 6:34 PM     HISTORY: Code Stroke.      TECHNIQUE:  Axial images of the head and coronal reformations without  IV contrast material. Radiation dose for this scan was reduced using  automated exposure control, adjustment of the mA and/or kV according  to patient size, or iterative reconstruction technique.    COMPARISON: None.    FINDINGS: There is area of gliosis with gray-white matter  differentiation loss in the right frontal operculum that has a chronic  appearance although acute region of ischemia cannot be excluded. Focal  hypodensity in the left basal ganglia likely represents a lacunar  infarct which may be acute or chronic.    No evidence of acute intracranial hemorrhage. No mass effect or  midline shift. Mild diffuse parenchymal volume loss. Periventricular  white matter hypodensities are likely related to chronic microvascular  ischemic disease. Ventricular size within normal limits without  hydrocephalus.    Scattered mucosal thickening in the paranasal sinuses. The bony  calvarium and bones of the skull base appear intact.       Impression    IMPRESSION:  1. Focal area of gliosis in the right frontal operculum with loss of  gray-white matter differentiation. This has the appearance of a more  chronic infarct although an acute area of ischemia cannot be excluded.  This could be better evaluated  with MRI if the patient is able.  2. Lacunar infarct within the left basal ganglia which may be acute or  chronic.  3. Diffuse parenchymal volume loss and white matter changes likely due  to chronic microvascular ischemic disease.    Results discussed with Ronnie Barajas at 6:40 PM on 4/29/2018.      JUSTINE RIBERA MD   CTA Angiogram Head Neck    Narrative    CT ANGIOGRAM OF THE HEAD AND NECK WITH CONTRAST  4/29/2018 6:41 PM     HISTORY: Code Stroke;     TECHNIQUE:  CT angiography with an injection of 70 mL Isovue-370 IV  with scans through the head and neck.  Images were transferred to a  separate 3-D workstation where multiplanar reformations and 3-D images  were created.  Estimates of carotid stenoses are made relative to the  distal internal carotid artery diameters except as noted. Radiation  dose for this scan was reduced using automated exposure control,  adjustment of the mA and/or kV according to patient size, or iterative  reconstruction technique.    COMPARISON: None.     CT HEAD FINDINGS: No contrast enhancing lesions. Cerebral blood flow  is grossly normal.     CT ANGIOGRAM HEAD FINDINGS:  Focal vascular cutoff of the proximal  right M1 segment concerning for embolism. There is relatively poor  collateralization of the distal vessels particularly along the  anterior right MCA aspect.    No other vascular cutoff is appreciated of the proximal intracranial  arteries.  No significant stenosis.    Inferiorly directed 2 mm aneurysm at the supraclinoid right internal  carotid artery.    CT ANGIOGRAM NECK FINDINGS:   There is atherosclerotic disease at the aortic arch and origins of the  great vessels. This results in mild stenosis at the origin of the left  subclavian artery.     Right carotid artery: The right common and internal carotid arteries  are patent. Marked soft and calcified plaque at the carotid  bifurcation and proximal internal carotid artery resulting in  approximately 52% stenosis by NASCET  criteria.     Left carotid artery: The left common and internal carotid arteries are  patent. Mild soft and calcified plaque at the left carotid bifurcation  without significant stenosis by NASCET criteria.     Vertebral arteries: Vertebral arteries are patent without evidence of  dissection. No significant stenosis.     Other findings: Emphysematous changes in the visualized lung apices.  Pacer wire partially visualized. Multilevel degenerative changes in  the spine.      Impression    IMPRESSION:   1. Focal vascular cutoff of the proximal right M1 segment concerning  for embolism. Relatively poor collaterization of the distal right MCA  vessels particularly anteriorly.  2. Atherosclerotic disease at the right carotid bifurcation resulting  in 52% stenosis by NASCET criteria. Mild atherosclerotic disease at  the left carotid bifurcation without significant stenosis.  3. Patent arteries in the neck without evidence of dissection. Mild  stenosis at the origin of the left subclavian artery.  4. 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    Results of proximal right M1 cutoff discussed with Ronnie Barajas at 6:45  PM on 4/29/2018.     JUSTINE RIBERA MD   CT Head w Contrast    Narrative    CT BRAIN PERFUSION 4/29/2018 6:54 PM    HISTORY: Code Stroke.      TECHNIQUE: Time sequential axial CT images of the head were acquired  during the administration of 50 mL Isovue-370 IV. Color perfusion maps  of the brain were created from this time sequential axial source data.      Radiation dose for this scan was reduced using automated exposure  control, adjustment of the mA and/or kV according to patient size, or  iterative reconstruction technique.    COMPARISON: None.    FINDINGS: There is a large perfusion defect within the right MCA  territory with increased time to drain and Tmax involving almost the  entirety of the right MCA territory. There is corresponding decrease  in cerebral  blood volume and cerebral blood flow also in the right MCA  territory involving at least 50% of the abnormal area.      Impression    IMPRESSION: Large area of ischemia involving the right MCA territory  also with marked decreased cerebral blood volume suggesting core  infarct involving at least greater than 50% of the area of ischemia.     JUSTINE RIBERA MD

## 2018-04-30 NOTE — PLAN OF CARE
Problem: Patient Care Overview  Goal: Plan of Care/Patient Progress Review    Discharge Planner SLP   Patient plan for discharge: Did not state  Current status: A bedside swallow evaluation was completed this am.  Patient presents with moderate-severe oral-pharyngeal dysphagia characterized by delayed swallows with mild decreased elevation and apraxia/poor oral and swallow awareness.  Patient required cues to swallow due to poor awareness.  Unable to rule out silent aspiration at bedside.  Recommend NPO status except for the following with nursing supervision: crushed meds and 1-10 tsps of honey thick, verify/cue swallows, hold if aspiration signs observed.  Plan to continue Tx this pm/tomorrow am as able with ongoing assessment of swallow function and safety for increased po intake.  Barriers to return to prior living situation: Confusion, weakness  Recommendations for discharge: TCU vs ARU with SLP services  Rationale for recommendations: SLP swallow Tx to maximize safety for a least restrictive diet; cognitive-linguistic eval and Tx       Entered by: Aliza Leos 04/30/2018 8:26 AM

## 2018-04-30 NOTE — PLAN OF CARE
Problem: Patient Care Overview  Goal: Plan of Care/Patient Progress Review  Outcome: Improving  RN assumed care from 10p-11p after arrival to [facility]. Pt found slumped over in chair at home. Noted Lt sided facial; droop and weakness, confusion. Pt mentation improved upon arrival to [facility], was A/O. Per Pt's family  Pt has difficulty with word finding at baseline and reads lips. [place] but does not have hearing aides with.. NIH stroke scale preformed by Certified RN on [facility], see chart...  Pacemaker, Skin intact, Lt sided facial droop and weakness noted.   NPO.  VSS on 2L 02. Family assisted with admission profile. D/C  Pending. Bed rest at this time

## 2018-04-30 NOTE — PLAN OF CARE
Problem: Patient Care Overview  Goal: Plan of Care/Patient Progress Review  Outcome: No Change  Tele SR with PAC's. BP HTN with systolic in 150's. Other VS stable. Denies pain. Incontinent of urine. Pericare provided PRN. Skin intact. Neuro assessments completed. Pt is A&Ox4. Speech is hoarse and garbled. Pt is [place] and has word finding difficulty at baseline. Does read lips. Mild L facial droop and LUE/LLE weakness present. Forgetfulness noted. PT/OT/SLP ordered. Plan for Echo today. Continue to monitor.

## 2018-04-30 NOTE — ED NOTES
Report to Jamir JUDGE. Pt's symptoms improving. Right gaze preference improving. Left arm strength improved. Pt denies HA. Speech less slurred. Facial droop less pronounced.

## 2018-04-30 NOTE — ED NOTES
"[facility]  ED Nurse Handoff Report    ED Chief complaint: Neurologic Problem (Left-sided deficit and slurred speech. Found at home by daughter's boyfriend. Last known normal at 1030. Hx of A-Fib but not taking blood thinners. Per EMS pt normally is alert and active.)      ED Diagnosis:   Final diagnoses:   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)       Code Status: DNR / DNI    Allergies: No Known Allergies    Activity level - Baseline/Home:  Independent    Activity Level - Current:   Unable to Assess     Needed?: No    Isolation: No  Infection: Not Applicable    Bariatric?: No    Vital Signs:   Vitals:    04/29/18 1850 04/29/18 1851 04/29/18 1852 04/29/18 1857   BP:   (!) 149/102    Pulse:       Resp: 26 14 20    Temp:       TempSrc:       SpO2:   97%    Weight:       Height:    1.778 m (5' 10\")       Cardiac Rhythm: ,        Pain level:      Is this patient confused?: No, alert and oriented x 3, needs some reorientation to situation.    Patient Report: Initial Complaint: stroke symptoms  Focused Assessment: patient was found in his chair by daughters boyfriend. Last known well was 1030. He presented to the ed with left sided defecits, unable to move left arm or leg with right gaze and left sided facial droop. Code stroke was called. CT shows a large clot. Neurologist spoke with IR and no intervention was warranted at this time due to size of clot and patients age. TPA was not given. Patient did receive 300mg aspirin suppository. He has a history of a fib, not on blood thinners, and a cardiac pacemaker.     Per family patient is usually very active and independent with a sharp mentation and is known for frequently making jokes. He does not use a walker or a cane, but does keep a stick he picked up on a walk around \"in case I need a cane\"    Currently he is alert and oriented x 3, requires some reorientation to current situation. He has a left sided facial " droop. He is slightly contracted with left arm and weaker on left side, he now is moving left arm and hands. He is weak on the left leg. He denies any numbness or tingling to extremities or face. Tongue is midline. Able to shrug bilateral shoulders with left side slightly weaker. Pupils equal and reactive, denies headache. Gaze now wnl. Speech is hoarse, no aphasia   Tests Performed: blood, ct  Abnormal Results: large clot noted in right hemisphere of brain   Treatments provided: aspirin 300mg rectally, 1 L NS    Family Comments: here with multiple family members    OBS brochure/video discussed/provided to patient: No    ED Medications:   Medications   Saline flush (100 mLs Intravenous Given 4/29/18 1840)   iopamidol (ISOVUE-370) solution 120 mL (120 mLs Intravenous Given 4/29/18 1840)   0.9% sodium chloride BOLUS (1,000 mLs Intravenous New Bag 4/29/18 1826)   aspirin Suppository 300 mg (300 mg Rectal Given 4/29/18 1941)       Drips infusing?:  No    For the majority of the shift this patient was [place].   Interventions performed were frequent rounding.    Severe Sepsis OR Septic Shock Diagnosis Present: No      ED NURSE PHONE NUMBER: [id number]

## 2018-04-30 NOTE — PROGRESS NOTES
"[facility]    Hospitalist Progress Note    Assessment & Plan   Angelito Castellanos is a 89 year old male with hx of chronic a-fib s/p PPM and not on anticoagulation who presents with slurred speech, left facial droop, and left sided weakness, and is being admitted for acute right MCA stroke.      Acute right MCA embolic stroke  Has chronic atrial fibrillation and declined anticoagulation in the past. Initial head CT showed stroke involving the right frontal operculum as well as lacunar infarct within the left basal ganglia of unclear chronicity. CTA head/neck showed focal cutoff of the proximal right M1 segment concerning for embolism, atherosclerosis of the carotid arteries, 2mm aneurysm involving the right ICA, and mild stenosis of the left subclavian artery. Neurology did not feel patient was a candidate for thrombectomy. He continues to have mildly slurred speech, facial droop, and left-sided weakness, but overall this has improved compared to initial onset.   Of note, patient's pacemaker is not MRI compatible so unable to obtain.   - Aspirin 325 PO/325 CT daily for now  - Will continue permissive hypertension at this time but appears to have normal blood pressures without intervention.  IV labetalol and hydralazine available for SBP>220  - TTE/bubble study done and awaiting results   - PT/OT/SLP following  - Neurology following     Chronic atrial fibrillation s/p PPM  [PTA: metoprolol 25 mg BID] As noted above, he has declined anticoagulation in the past stating that he was offered \"rat poison.\"  - Hold metoprolol while NPO  - On metoprolol 2.5 mg IV q6h while NPO     History of pre-diabetes  HgbA1c is 7.0.  Patient has discussed this with his PCP regarding diet controlled and has elected to not due that given his age       # Pain Assessment:  Current Pain Score 4/30/2018   Patient currently in pain? denies   Angelito s pain level was assessed and he currently denies pain.        DVT Prophylaxis: " Pneumatic Compression Devices  Code Status: DNR/DNI    Disposition: Expected discharge in 1-2 days once work-up and therapy evaluations complete.  May benefit from TCU/ARU     Rudolph Mathis DO  Text Page (7am to 6pm)    Interval History   Patient seen and examined.  He was sleeping when I evaluated him initially.  Has had no complaints.      -Data reviewed today: I reviewed all new labs and imaging results over the last 24 hours. I personally reviewed no images or EKG's today.    Physical Exam   Temp: 97.9  F (36.6  C) Temp src: Oral BP: 133/70 Pulse: 73 Heart Rate: 96 Resp: 20 SpO2: 94 % O2 Device: Nasal cannula Oxygen Delivery: 2 LPM  Vitals:    04/29/18 1828 04/30/18 0431   Weight: 71.4 kg (157 lb 6.5 oz) 73.5 kg (162 lb)     Vital Signs with Ranges  Temp:  [97.3  F (36.3  C)-98.3  F (36.8  C)] 97.9  F (36.6  C)  Pulse:  [73-94] 73  Heart Rate:  [] 96  Resp:  [10-26] 20  BP: (128-186)/() 133/70  SpO2:  [94 %-98 %] 94 %  I/O last 3 completed shifts:  In: -   Out: 125 [Urine:125]    Constitutional: Sleeping but arousable.  NAD   Respiratory: Clear to auscultation bilaterally, no crackles or wheezing  Cardiovascular: Irregularly irregular, normal S1 and S2, and no murmur noted  GI: Normal bowel sounds, soft, non-distended, non-tender  Skin/Integumen: No rashes, no cyanosis  MSK: No edema     Medications     - MEDICATION INSTRUCTIONS -       sodium chloride 75 mL/hr at 04/30/18 1138       aspirin EC  325 mg Oral Daily    Or     aspirin  300 mg Rectal Daily     metoprolol  2.5 mg Intravenous Q6H     sodium chloride (PF)  3 mL Intracatheter Q8H       Data     Recent Labs  Lab 04/30/18  0621 04/29/18  1822   WBC 7.7 7.0   HGB 14.4 15.4   MCV 89 89    162   INR  --  1.03    141   POTASSIUM 4.0 4.4   CHLORIDE 109 107   CO2 26 27   BUN 17 21   CR 1.05 0.95   ANIONGAP 7 7   EDMUNDO 8.2* 8.9   * 120*   TROPI <0.015  --        Imaging:   Recent Results (from the past 24 hour(s))   CT Head w/o  Contrast    Narrative    CT SCAN OF THE HEAD WITHOUT CONTRAST   4/29/2018 6:34 PM     HISTORY: Code Stroke.      TECHNIQUE:  Axial images of the head and coronal reformations without  IV contrast material. Radiation dose for this scan was reduced using  automated exposure control, adjustment of the mA and/or kV according  to patient size, or iterative reconstruction technique.    COMPARISON: None.    FINDINGS: There is area of gliosis with gray-white matter  differentiation loss in the right frontal operculum that has a chronic  appearance although acute region of ischemia cannot be excluded. Focal  hypodensity in the left basal ganglia likely represents a lacunar  infarct which may be acute or chronic.    No evidence of acute intracranial hemorrhage. No mass effect or  midline shift. Mild diffuse parenchymal volume loss. Periventricular  white matter hypodensities are likely related to chronic microvascular  ischemic disease. Ventricular size within normal limits without  hydrocephalus.    Scattered mucosal thickening in the paranasal sinuses. The bony  calvarium and bones of the skull base appear intact.       Impression    IMPRESSION:  1. Focal area of gliosis in the right frontal operculum with loss of  gray-white matter differentiation. This has the appearance of a more  chronic infarct although an acute area of ischemia cannot be excluded.  This could be better evaluated with MRI if the patient is able.  2. Lacunar infarct within the left basal ganglia which may be acute or  chronic.  3. Diffuse parenchymal volume loss and white matter changes likely due  to chronic microvascular ischemic disease.    Results discussed with Ronnie Barajas at 6:40 PM on 4/29/2018.      JUSTINE RIBERA MD   CTA Angiogram Head Neck    Narrative    CT ANGIOGRAM OF THE HEAD AND NECK WITH CONTRAST  4/29/2018 6:41 PM     HISTORY: Code Stroke;     TECHNIQUE:  CT angiography with an injection of 70 mL Isovue-370 IV  with scans through the  head and neck.  Images were transferred to a  separate 3-D workstation where multiplanar reformations and 3-D images  were created.  Estimates of carotid stenoses are made relative to the  distal internal carotid artery diameters except as noted. Radiation  dose for this scan was reduced using automated exposure control,  adjustment of the mA and/or kV according to patient size, or iterative  reconstruction technique.    COMPARISON: None.     CT HEAD FINDINGS: No contrast enhancing lesions. Cerebral blood flow  is grossly normal.     CT ANGIOGRAM HEAD FINDINGS:  Focal vascular cutoff of the proximal  right M1 segment concerning for embolism. There is relatively poor  collateralization of the distal vessels particularly along the  anterior right MCA aspect.    No other vascular cutoff is appreciated of the proximal intracranial  arteries.  No significant stenosis.    Inferiorly directed 2 mm aneurysm at the supraclinoid right internal  carotid artery.    CT ANGIOGRAM NECK FINDINGS:   There is atherosclerotic disease at the aortic arch and origins of the  great vessels. This results in mild stenosis at the origin of the left  subclavian artery.     Right carotid artery: The right common and internal carotid arteries  are patent. Marked soft and calcified plaque at the carotid  bifurcation and proximal internal carotid artery resulting in  approximately 52% stenosis by NASCET criteria.     Left carotid artery: The left common and internal carotid arteries are  patent. Mild soft and calcified plaque at the left carotid bifurcation  without significant stenosis by NASCET criteria.     Vertebral arteries: Vertebral arteries are patent without evidence of  dissection. No significant stenosis.     Other findings: Emphysematous changes in the visualized lung apices.  Pacer wire partially visualized. Multilevel degenerative changes in  the spine.      Impression    IMPRESSION:   1. Focal vascular cutoff of the proximal right  M1 segment concerning  for embolism. Relatively poor collaterization of the distal right MCA  vessels particularly anteriorly.  2. Atherosclerotic disease at the right carotid bifurcation resulting  in 52% stenosis by NASCET criteria. Mild atherosclerotic disease at  the left carotid bifurcation without significant stenosis.  3. Patent arteries in the neck without evidence of dissection. Mild  stenosis at the origin of the left subclavian artery.  4. 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    Results of proximal right M1 cutoff discussed with Ronnie Barajas at 6:45  PM on 4/29/2018.     JUSTINE RIBERA MD   CT Head w Contrast    Narrative    CT BRAIN PERFUSION 4/29/2018 6:54 PM    HISTORY: Code Stroke.      TECHNIQUE: Time sequential axial CT images of the head were acquired  during the administration of 50 mL Isovue-370 IV. Color perfusion maps  of the brain were created from this time sequential axial source data.      Radiation dose for this scan was reduced using automated exposure  control, adjustment of the mA and/or kV according to patient size, or  iterative reconstruction technique.    COMPARISON: None.    FINDINGS: There is a large perfusion defect within the right MCA  territory with increased time to drain and Tmax involving almost the  entirety of the right MCA territory. There is corresponding decrease  in cerebral blood volume and cerebral blood flow also in the right MCA  territory involving at least 50% of the abnormal area.      Impression    IMPRESSION: Large area of ischemia involving the right MCA territory  also with marked decreased cerebral blood volume suggesting core  infarct involving at least greater than 50% of the area of ischemia.     JUSTINE RIBERA MD

## 2018-04-30 NOTE — PHARMACY-ADMISSION MEDICATION HISTORY
Admission medication history interview status for the 4/29/2018  admission is complete. See EPIC admission navigator for prior to admission medications     Medication history source reliability:Good    Actions taken by pharmacist (provider contacted, etc):  Unable to interview pt.  Called pt's daughter, Shae.  Per daughter, pt is only on metoprolol.  Called 2 different pharmacies.  Found pt is getting his medication at [facility] in [place], [phone].     Additional medication history information not noted on PTA med list :    --  The only other medication on the profile was Flomax but that last filled in Aug 2017.    Medication reconciliation/reorder completed by provider prior to medication history? No    Time spent in this activity:  15 minutes    Prior to Admission medications    Medication Sig Last Dose Taking? Auth Provider   METOPROLOL TARTRATE PO Take 25 mg by mouth 2 times daily  Yes Unknown, Entered By History

## 2018-04-30 NOTE — PROGRESS NOTES
Vascular Neurology Progress Note    ____________________________________________________________    Admission Summary:  Angelito Castellanos is a 89 year old male with chronic Afib (no AC) who was admitted for R. MCA syndrome secondary to R. M1 occlusion.  His imaging showed moderate infarction on CT Perfusion and his symptoms were improving, thus he was not a candidate for endovascular treatment.  His CTA showed R. ICA stenosis probably > 50% with calcification.    Last 24 hours:      He remains aphasic, his strength is now symmetric    Impression:    1. Afib without AC  2. Moderate carotid stenosis  3. Acute ischemic stroke with large vessel occlusion    Recommendations:     1. Admit for stroke work-up: LDL, A1c  2. TTE can be deferred as patient has known Afib  3. Carotid revascularization and/or anticoagulation to be determined based upon infarct volume.  Repeat HCT  4. PT/OT/Speech  5. SBP cap 220    Stroke Education provided including signs/symptoms of a stroke and the importance of timely treatment.    Please contact the Stroke Service with any questions.  Thank you.    Zeke Kaplan MD, MS  Neurology    Please contact the stroke service with any questions: link to Text page      ____________________________________________________________________        Medications:      Current Facility-Administered Medications:      acetaminophen (TYLENOL) tablet 650 mg, 650 mg, Oral, Q4H PRN, Gabriela Zaidi MD     aspirin EC tablet 325 mg, 325 mg, Oral, Daily **OR** aspirin Suppository 300 mg, 300 mg, Rectal, Daily, Gabriela Zaidi MD, 300 mg at 04/30/18 0933     bisacodyl (DULCOLAX) Suppository 10 mg, 10 mg, Rectal, Daily PRN, Gabriela Zaidi MD     hydrALAZINE (APRESOLINE) injection 10-20 mg, 10-20 mg, Intravenous, Q1H PRN, Gabriela Zaidi MD     labetalol (NORMODYNE/TRANDATE) injection 10-40 mg, 10-40 mg, Intravenous, Q10 Min PRN, Gabriela Zaidi MD     lidocaine (LMX4) cream, , Topical, Q1H  PRN, Gabriela Zaidi MD     lidocaine 1 % 1 mL, 1 mL, Other, Q1H PRN, Gabriela Zaidi MD     magnesium sulfate 4 g in 100 mL sterile water (premade), 4 g, Intravenous, Q4H PRN, Gabriela Zaidi MD     Medication Instruction, , Does not apply, Continuous PRN, Gabriela Zaidi MD     metoprolol (LOPRESSOR) injection 2.5 mg, 2.5 mg, Intravenous, Q6H, Gabriela Zaidi MD, 2.5 mg at 04/30/18 1243     metoprolol (LOPRESSOR) injection 5 mg, 5 mg, Intravenous, Q4H PRN, Gabriela Zaidi MD     naloxone (NARCAN) injection 0.1-0.4 mg, 0.1-0.4 mg, Intravenous, Q2 Min PRN, Gabriela Zaidi MD     ondansetron (ZOFRAN-ODT) ODT tab 4 mg, 4 mg, Oral, Q6H PRN **OR** ondansetron (ZOFRAN) injection 4 mg, 4 mg, Intravenous, Q6H PRN, Gabriela Zaidi MD     polyethylene glycol (MIRALAX/GLYCOLAX) Packet 17 g, 17 g, Oral, Daily PRN, Gabriela Zaidi MD     potassium chloride (KLOR-CON) Packet 20-40 mEq, 20-40 mEq, Oral or Feeding Tube, Q2H PRN, Gabriela Zaidi MD     potassium chloride 10 mEq in 100 mL intermittent infusion with 10 mg lidocaine, 10 mEq, Intravenous, Q1H PRN, Gabriela Zaidi MD     potassium chloride 10 mEq in 100 mL sterile water intermittent infusion (premix), 10 mEq, Intravenous, Q1H PRN, Gabriela Zaidi MD     potassium chloride 20 mEq in 50 mL intermittent infusion, 20 mEq, Intravenous, Q1H PRN, Garbiela Zaidi MD     potassium chloride SA (K-DUR/KLOR-CON M) CR tablet 20-40 mEq, 20-40 mEq, Oral, Q2H PRN, Gabriela Zaidi MD     prochlorperazine (COMPAZINE) injection 5 mg, 5 mg, Intravenous, Q6H PRN **OR** prochlorperazine (COMPAZINE) tablet 5 mg, 5 mg, Oral, Q6H PRN **OR** prochlorperazine (COMPAZINE) Suppository 12.5 mg, 12.5 mg, Rectal, Q12H PRN, Gabriela Zaidi MD     senna-docusate (SENOKOT-S;PERICOLACE) 8.6-50 MG per tablet 1 tablet, 1 tablet, Oral, BID PRN **OR** senna-docusate (SENOKOT-S;PERICOLACE) 8.6-50 MG per tablet 2 tablet, 2  "tablet, Oral, BID PRN, Gabriela Zaidi MD     sodium chloride (PF) 0.9% PF flush 3 mL, 3 mL, Intracatheter, Q1H PRN, Gabriela Zaidi MD     sodium chloride (PF) 0.9% PF flush 3 mL, 3 mL, Intracatheter, Q8H, Gabriela Zaidi MD, 3 mL at 04/29/18 2141     sodium chloride 0.9% infusion, , Intravenous, Continuous, Gabriela Zaidi MD, Last Rate: 75 mL/hr at 04/30/18 1138      [employer] Stroke Scale  Exam Interval: 12 hours post admission   Score    Level of consciousness: (1)   Not alert; arousable w/ minor stim to obey/answer/respond    LOC questions: (2)   Answers neither question correctly    LOC commands: (1)   Performs one task correctly    Best gaze: (0)   Normal    Visual: (0)   No visual loss    Facial palsy: (0)   Normal symmetrical movements    Motor arm (left): (0)   No drift    Motor arm (right): (0)   No drift    Motor leg (left): (0)   No drift    Motor leg (right): (0)   No drift    Limb ataxia: (0)   Absent    Sensory: (1)   Mild to moderate sensory loss    Best language: (2)   Severe aphasia    Dysarthria: (2)   Severe dysarthria    Extinction and inattention: (1)   Visual, tacile, auditory, spatial, person inattention        Total Score:  10       Vital Signs:  B/P: 152/58, T: 97.5, P: 77, R: 18    /58  Pulse 77  Temp 97.5  F (36.4  C) (Oral)  Resp 18  Ht 1.778 m (5' 10\")  Wt 73.5 kg (162 lb)  SpO2 96%  BMI 23.24 kg/m2  General: Awake and alert, not in any acute distress, cooperative  HEENT: Atraumatic, normocephalic, no scleral icterus or conjunctival pallor   Cardiac: RRR  Chest: Clear to auscultation  Abdomen: Soft, non-tender, non-distended  Extremities: No LE swelling.  Skin: No rash or lesion.   Psych: Normal mood and affect     Neuro:  Mental status: Asleep, takes significant stimulation to arouse.  Speech is non-fluent with dysarthria.  Expressive > receptive aphasia although comprehension also affected.  Difficult to assess but appears to " have mild left neglect.  t.  Cranial nerves:   EOMI, ? L VF cut--not consistent, face symmetric, facial sensation intact, shoulder shrug strong, palate rise symmetric, tongue/uvula midline, hearing intact to conversation.  Motor: Tone normal. Antigravity x 4  Sensory: Intact to light touch, temp.    Coordination: Finger nose finger intact bilaterally, no dysmetria, normal heel-shin test bilaterally  Gait: deferred    Labs/Studies:  CBC:     Recent Labs  Lab 04/30/18  0621 04/29/18  1822   WBC 7.7 7.0   RBC 4.68 4.91   HGB 14.4 15.4   HCT 41.6 43.9    162     Basic Metabolic Panel:   Recent Labs   Lab Test  04/30/18   0621  04/29/18   1822   NA  142  141   POTASSIUM  4.0  4.4   CHLORIDE  109  107   CO2  26  27   BUN  17  21   CR  1.05  0.95   GLC  121*  120*   EDMUNDO  8.2*  8.9     Liver panel:  No lab results found.  INR:  Recent Labs   Lab Test  04/29/18   1822   INR  1.03      Lipid Profile:  Recent Labs   Lab Test  04/30/18   0621   CHOL  174   HDL  35*   LDL  116*   TRIG  116     A1C:   Recent Labs   Lab Test  04/29/18   1822   A1C  7.0*     Troponin I:   Recent Labs   Lab Test  04/30/18   1440  04/30/18   0621   TROPI  <0.015  <0.015         Imaging:  Relevant findings as per the Impression above.

## 2018-04-30 NOTE — PROGRESS NOTES
RECEIVING UNIT ED HANDOFF REVIEW    ED Nurse Handoff Report was reviewed by: Chirag Pagan on April 29, 2018 at 8:48 PM

## 2018-04-30 NOTE — PLAN OF CARE
Problem: Patient Care Overview  Goal: Plan of Care/Patient Progress Review  Discharge Planner OT   Patient plan for discharge: None stated today.  Current status: Eval complete and treatment initiated. Patient with decreased sitting balance at EOB, leans posteriorly and to the R. DEP for LE ADL. Limited insight into deficits.   Barriers to return to prior living situation: Needs A with all ADL and mobility.   Recommendations for discharge: ARC.  Rationale for recommendations: Motivated to get stronger and return to I. Has supportive daughters.        Entered by: Maryam Orozco 04/30/2018 10:51 AM

## 2018-05-01 ENCOUNTER — APPOINTMENT (OUTPATIENT)
Dept: SPEECH THERAPY | Facility: CLINIC | Age: 83
DRG: 065 | End: 2018-05-01
Payer: MEDICARE

## 2018-05-01 ENCOUNTER — APPOINTMENT (OUTPATIENT)
Dept: PHYSICAL THERAPY | Facility: CLINIC | Age: 83
DRG: 065 | End: 2018-05-01
Payer: MEDICARE

## 2018-05-01 ENCOUNTER — APPOINTMENT (OUTPATIENT)
Dept: OCCUPATIONAL THERAPY | Facility: CLINIC | Age: 83
DRG: 065 | End: 2018-05-01
Payer: MEDICARE

## 2018-05-01 PROCEDURE — 97535 SELF CARE MNGMENT TRAINING: CPT | Mod: GO | Performed by: OCCUPATIONAL THERAPY ASSISTANT

## 2018-05-01 PROCEDURE — 12000000 ZZH R&B MED SURG/OB

## 2018-05-01 PROCEDURE — 40000193 ZZH STATISTIC PT WARD VISIT

## 2018-05-01 PROCEDURE — 40000141 ZZH STATISTIC PERIPHERAL IV START W/O US GUIDANCE

## 2018-05-01 PROCEDURE — 97116 GAIT TRAINING THERAPY: CPT | Mod: GP

## 2018-05-01 PROCEDURE — 97110 THERAPEUTIC EXERCISES: CPT | Mod: GP

## 2018-05-01 PROCEDURE — A9270 NON-COVERED ITEM OR SERVICE: HCPCS | Mod: GY | Performed by: INTERNAL MEDICINE

## 2018-05-01 PROCEDURE — 92526 ORAL FUNCTION THERAPY: CPT | Mod: GN | Performed by: SPEECH-LANGUAGE PATHOLOGIST

## 2018-05-01 PROCEDURE — 25000132 ZZH RX MED GY IP 250 OP 250 PS 637: Mod: GY | Performed by: INTERNAL MEDICINE

## 2018-05-01 PROCEDURE — 40000225 ZZH STATISTIC SLP WARD VISIT: Performed by: SPEECH-LANGUAGE PATHOLOGIST

## 2018-05-01 PROCEDURE — 97530 THERAPEUTIC ACTIVITIES: CPT | Mod: GO | Performed by: OCCUPATIONAL THERAPY ASSISTANT

## 2018-05-01 PROCEDURE — 99233 SBSQ HOSP IP/OBS HIGH 50: CPT | Performed by: PSYCHIATRY & NEUROLOGY

## 2018-05-01 PROCEDURE — 40000133 ZZH STATISTIC OT WARD VISIT: Performed by: OCCUPATIONAL THERAPY ASSISTANT

## 2018-05-01 PROCEDURE — 99233 SBSQ HOSP IP/OBS HIGH 50: CPT | Performed by: INTERNAL MEDICINE

## 2018-05-01 PROCEDURE — 97530 THERAPEUTIC ACTIVITIES: CPT | Mod: GP

## 2018-05-01 PROCEDURE — 97161 PT EVAL LOW COMPLEX 20 MIN: CPT | Mod: GP

## 2018-05-01 PROCEDURE — 25000125 ZZHC RX 250: Performed by: INTERNAL MEDICINE

## 2018-05-01 RX ORDER — METOPROLOL TARTRATE 25 MG/1
25 TABLET, FILM COATED ORAL 2 TIMES DAILY
Status: DISCONTINUED | OUTPATIENT
Start: 2018-05-01 | End: 2018-05-03 | Stop reason: HOSPADM

## 2018-05-01 RX ADMIN — ASPIRIN 325 MG: 325 TABLET, DELAYED RELEASE ORAL at 07:51

## 2018-05-01 RX ADMIN — METOPROLOL TARTRATE 2.5 MG: 5 INJECTION INTRAVENOUS at 05:43

## 2018-05-01 RX ADMIN — METOPROLOL TARTRATE 2.5 MG: 5 INJECTION INTRAVENOUS at 00:21

## 2018-05-01 RX ADMIN — ACETAMINOPHEN 650 MG: 325 TABLET ORAL at 07:51

## 2018-05-01 RX ADMIN — ACETAMINOPHEN 650 MG: 325 TABLET ORAL at 14:36

## 2018-05-01 RX ADMIN — METOPROLOL TARTRATE 2.5 MG: 5 INJECTION INTRAVENOUS at 12:15

## 2018-05-01 RX ADMIN — METOPROLOL TARTRATE 25 MG: 25 TABLET ORAL at 23:59

## 2018-05-01 ASSESSMENT — ACTIVITIES OF DAILY LIVING (ADL)
ADLS_ACUITY_SCORE: 18
ADLS_ACUITY_SCORE: 18
ADLS_ACUITY_SCORE: 17
ADLS_ACUITY_SCORE: 18

## 2018-05-01 ASSESSMENT — VISUAL ACUITY
OU: BASELINE;GLASSES

## 2018-05-01 NOTE — PLAN OF CARE
Problem: Stroke (Ischemic) (Adult)  Goal: Signs and Symptoms of Listed Potential Problems Will be Absent, Minimized or Managed (Stroke)  Signs and symptoms of listed potential problems will be absent, minimized or managed by discharge/transition of care (reference Stroke (Ischemic) (Adult) CPG).   Outcome: Improving  Disoriented to time, oriented to situation, as evidenced by asking about tests and procedures. Oriented to self and place. L facial droop. VSS. Tele on demand V paced. Clear liquid diet with honey liquids. Up with assist of 1, walkerSRI. Turn/repo q 2 hours. Incontinent of urine at times, uses urinal. IV leaking, new IV placed. C/o shoulder pain, helped with Tylenol. Pills crushed in honey thick liquid. Swallow study 5/2. Plan to discharge to ARU pending placement.

## 2018-05-01 NOTE — PROGRESS NOTES
" 05/01/18 0900   Quick Adds   Type of Visit Initial PT Evaluation   Living Environment   Lives With child(jeanette), adult  (daughter)   Living Arrangements house   Home Accessibility stairs to enter home;stairs within home   Number of Stairs to Enter Home 1   Number of Stairs Within Home 7   Stair Railings at Home inside, present on right side   Living Environment Comment bedroom upstairs   Self-Care   Dominant Hand right   Usual Activity Tolerance good   Current Activity Tolerance fair   Functional Level Prior   Ambulation 0-->independent   Transferring 0-->independent   Toileting 0-->independent   Bathing 0-->independent   Dressing 0-->independent   Eating 0-->independent   Fall history within last six months yes   Number of times patient has fallen within last six months 1   Prior Functional Level Comment Pt is home alone for periods of time during the day   General Information   Onset of Illness/Injury or Date of Surgery - Date 04/29/18   Referring Physician Gabriela Zaidi MD   Patient/Family Goals Statement \"Go home\"   Pertinent History of Current Problem (include personal factors and/or comorbidities that impact the POC) 89 YOM presented with slured speech, L facial and L side weakness. Dx'd with an acute R MCA CVA. PMH: a-fib s/p PPM    Precautions/Limitations fall precautions;swallowing precautions   Weight-Bearing Status - LUE full weight-bearing   Weight-Bearing Status - RUE full weight-bearing   Weight-Bearing Status - LLE full weight-bearing   Weight-Bearing Status - RLE full weight-bearing   General Observations Pleasant and cooperative   General Info Comments Activity: up with assist   Cognitive Status Examination   Orientation person   Level of Consciousness alert   Follows Commands and Answers Questions 75% of the time;able to follow single-step instructions   Cognitive Comment Defer to OT. baseline word finding difficulty per daughter   Pain Assessment   Patient Currently in Pain No   Posture  " "  Posture Forward head position;Protracted shoulders   Range of Motion (ROM)   ROM Comment BLEs WFL   Strength   Strength Comments BLEs grossly 5/5 throughout but does fatigue with repetition and only able to compete 3-4 reps before getting tired and moving through less ROM   Bed Mobility   Bed Mobility Comments Not tested as pt was already up in a chair   Transfer Skills   Transfer Comments Sit<>stand from chair to FWW with Rosario   Gait   Gait Comments Gait with FWW x 5' with Rosario, reciprocal pattern, good foot clearance B, flexed posture   Balance   Balance Comments Good sitting, requires UE support for standing   Sensory Examination   Sensory Perception no deficits were identified   Coordination   Coordination no deficits were identified   Coordination Comments finger to nose and heel to shin, toe taps intact   General Therapy Interventions   Planned Therapy Interventions balance training;bed mobility training;gait training;neuromuscular re-education;transfer training;strengthening   Clinical Impression   Criteria for Skilled Therapeutic Intervention yes, treatment indicated   PT Diagnosis Impaired gait   Influenced by the following impairments weakness, fatigue, impaired balance   Functional limitations due to impairments bed mobility, transfers, gait   Clinical Presentation Stable/Uncomplicated   Clinical Presentation Rationale see above   Clinical Decision Making (Complexity) Low complexity   Therapy Frequency` 2 times/day   Predicted Duration of Therapy Intervention (days/wks) 3 days   Anticipated Discharge Disposition Acute Rehabilitation Facility   Risk & Benefits of therapy have been explained Yes   Patient, Family & other staff in agreement with plan of care Yes   [facility] [employer] TM \"6 Clicks\"   2016, Trustees of [facility], under license to [employer].  All rights reserved.   6 Clicks Short Forms Basic Mobility Inpatient Short Form   [facility] [employer]itedHomePAC  \"6 Clicks\" V.2 Basic " Mobility Inpatient Short Form   1. Turning from your back to your side while in a flat bed without using bedrails? 3 - A Little   2. Moving from lying on your back to sitting on the side of a flat bed without using bedrails? 3 - A Little   3. Moving to and from a bed to a chair (including a wheelchair)? 3 - A Little   4. Standing up from a chair using your arms (e.g., wheelchair, or bedside chair)? 3 - A Little   5. To walk in hospital room? 3 - A Little   6. Climbing 3-5 steps with a railing? 2 - A Lot   Basic Mobility Raw Score (Score out of 24.Lower scores equate to lower levels of function) 17   Total Evaluation Time   Total Evaluation Time (Minutes) 10

## 2018-05-01 NOTE — PLAN OF CARE
Problem: Patient Care Overview  Goal: Plan of Care/Patient Progress Review  Outcome: No Change  Neuro exam exhibits left facial droop, LUE/LLE hemiparesis, LUE ataxia, and hoarse/garbled speech.  A&O x4 but likes to joke around so sometimes needs prompting to stay on task.  [place]. VSS, tele paced with occasional PVC's.  Incontinent of B&B at times, otherwise uses urinal.  Up with 1-2, and GB to bathroom.  Cleared for honey thickened liquids by speech.  CT head completed this evening.  Family would like to discuss echo and CT results with MD in AM.

## 2018-05-01 NOTE — PLAN OF CARE
Problem: Patient Care Overview  Goal: Plan of Care/Patient Progress Review  Discharge Planner SLP   Patient plan for discharge: Patient did not state.   Current status: Patient continues to present with moderate to severe oral and pharyngeal dysphagia. He demonstrated premature entry of thin liquids with overt Sx of aspiration. He demonstrated reduced bolus control with nectar thick liquids by spoon. Tolerated 2/4 trials then throat clearing suspect penetration. Tolerated honey thick liquids on 8/8 trials but with reduced laryngeal elevation. Suspect moderate to large amount of pharyngeal residue with pudding due to reduce laryngeal elevation and vocal changes. Daughter reports baseline dysphagia. Recommend: 1. Continue on the clear liquid honey thick diet by spoon only. 2. Up in chair, hard swallow x2. 3. Will complete a video swallow study on 5/2/18, to determine aspiration risk and safe advancement of diet.   Barriers to return to prior living situation: Acuity of his illness  Recommendations for discharge: [facility]  Rationale for recommendations: Patient is very motivated to return to more of a PO diet and will need on going ST for swallowing. Not at his baseline.        Entered by: Idania Hough 05/01/2018 12:03 PM

## 2018-05-01 NOTE — PLAN OF CARE
Problem: Patient Care Overview  Goal: Plan of Care/Patient Progress Review  Outcome: No Change  A&O, D/O to time. Neuros: L droop, LLE weakness, and hoarse/garbled speech. RUE limited mobility and weakness d/t R shoulder pain. VSS. Tele. Honey thick liquids. Meds crushed in Honey thick. Up with A1-2, GB and walker. Incontinent of bladder. Tylenol given for shoulder pt, pt sleeping. D/c pending.

## 2018-05-01 NOTE — PROGRESS NOTES
ARC admissions: Met with the patient at the request of Care Management due to therapist recommendations of [facility] upon discharge. Provided pamphlet on [facility], and educated the patient in the location of the unit. Also educated the patient on the benefits of ARC including intensive therapies, close medical management, and rehabilitative nursing care. The patient reports he was independent at baseline with all mobility and ADLs. He reports he is motivated to return home and that his daughters will be able to provide assist as needed upon discharge. The patient reports he is agreeable to [facility] upon discharge.     Thank you for the referral, we will continue to follow this patient for post acute placement.     Determination of admission is based upon the patient's need for an intensive, interdisciplinary approach to rehabilitation, their ability to progress, their ability to tolerate intensive therapies, their need for daily physician supervision, their need for twenty four hour nursing assistance, and their ability and willingness to participate in such a program.    Derek Seals CM  Rehab Liaison/  [facility]  5/1/2018    12:39 PM

## 2018-05-01 NOTE — PROGRESS NOTES
"Neuro ICU Stroke Care Progress Note    2018    24 hour events:  Stable.     24 Hour Vital Signs Summary:  Temperatures:  Current - Temp: 98  F (36.7  C); Max - Temp  Av.8  F (36.6  C)  Min: 97.4  F (36.3  C)  Max: 98.1  F (36.7  C)  Respiration range: Resp  Av  Min: 16  Max: 22  Pulse range: Pulse  Av  Min: 77  Max: 77  Blood pressure range: Systolic (24hrs), Av , Min:132 , Max:192   ; Diastolic (24hrs), Av, Min:58, Max:108    Pulse oximetry range: SpO2  Av.4 %  Min: 94 %  Max: 97 %    Ventilator Settings  Resp: 18      Intake/Output Summary (Last 24 hours) at 18 1219  Last data filed at 18 0800   Gross per 24 hour   Intake             1042 ml   Output                0 ml   Net             1042 ml            Current Medications:    aspirin EC  325 mg Oral Daily    Or     aspirin  300 mg Rectal Daily     sodium chloride (PF)  3 mL Intracatheter Q8H       PRN Medications:  acetaminophen, bisacodyl, hydrALAZINE, labetalol, lidocaine 4%, lidocaine (buffered or not buffered), magnesium sulfate, - MEDICATION INSTRUCTIONS -, metoprolol, naloxone, ondansetron **OR** ondansetron, polyethylene glycol, potassium chloride, potassium chloride with lidocaine, potassium chloride, potassium chloride, potassium chloride, prochlorperazine **OR** prochlorperazine **OR** prochlorperazine, senna-docusate **OR** senna-docusate, sodium chloride, sodium chloride (PF)    Infusions:    - MEDICATION INSTRUCTIONS -       sodium chloride 75 mL/hr at 18 0800       No Known Allergies    Physical Examination:  /90 (BP Location: Left arm)  Pulse 77  Temp 98  F (36.7  C) (Oral)  Resp 18  Ht 1.778 m (5' 10\")  Wt 70.8 kg (156 lb)  SpO2 96%  BMI 22.38 kg/m2  Alert, oriented to place and person, follows simple command, dysarthric, PERRLA, EOMI, subtle left facial droop, bilateral upper extremity 4/5 bilateral lower extremity 4/5, he is using his left hand more often compared to right " side, minimal to none visual or sensory neglect.      Labs/Studies:  Recent Labs   Lab Test  04/30/18   0621  04/29/18   1822   NA  142  141   POTASSIUM  4.0  4.4   CHLORIDE  109  107   CO2  26  27   ANIONGAP  7  7   GLC  121*  120*   BUN  17  21   CR  1.05  0.95   EDMUNDO  8.2*  8.9   WBC  7.7  7.0   RBC  4.68  4.91   HGB  14.4  15.4   PLT  154  162       Recent Labs   Lab Test  04/29/18   1822   INR  1.03   PTT  31         No lab results found in last 7 days.        Imaging:  Reviewed    Assessment/Plan  Angelito Castellanos is a 89 year old h/o atrial fibrillation,  not on anticoagulation presented (chose not to) with right M1 occlusion with an established moderate to large size infarct.  Transthoracic echocardiogram does not show evidence of any acute thrombus.    Neurologically, overall he is doing well compared to the infarct size on his scans.  Moving forward he would benefit from anticoagulation.  The patient initially did not want to be on anticoagulation, however, he is open for newer oral anticoagulant medications.  The risks and benefits were discussed with sister at bedside.    Plan:  -Consider starting apixaban after 2 weeks  -Start Lipitor 20 mg daily  -PT/OT/speech  -Outpatient neurology follow-up  -Blood pressure can slowly be controlled over next 1-2 weeks with long-term blood pressure goal of normotension  -Can be discharged when ready from medical standpoint    Plan discussed with Dr. Rosaura Potter   Fellow

## 2018-05-01 NOTE — PLAN OF CARE
Problem: Patient Care Overview  Goal: Plan of Care/Patient Progress Review  Discharge Planner OT   Patient plan for discharge: none stated  Current status: Pt completed supine to sit EOB with cues and mod A, dependent to doff depends and mod A to don new depends, min/mod A of 2 sit to stand and complete bed to chair transfer, cues needed to sequence steps for walker and gait to complete transfer.   Barriers to return to prior living situation: Needs A with all ADL and mobility.  Recommendations for discharge: ARU per plan established by the Occupational Therapist  Rationale for recommendations: Motivated to get stronger and return to I. Has supportive daughters       Entered by: Rajwinder Nunes 05/01/2018 9:05 AM

## 2018-05-01 NOTE — PLAN OF CARE
Problem: Patient Care Overview  Goal: Plan of Care/Patient Progress Review  PT: PT orders received, initial eval completed and treatment initiated. Patient lives with his daughter in a house with stairs to access the second floor bedroom. Previously independent with all ADLs and mobility without a device. He is home alone for periods of time during the day.Pt presented with L side weakness and slurred speech. Diagnosed with a R MCA.    Discharge Planner PT   Patient plan for discharge: home  Current status: Pt was up in a chair upon PT arrival. Pt requires Rosario for sit<>stand from chair to FWW, Rosario for gait x 40' with a FWW. Pt does fatigue quickly with activity and repeated LE exercises.  Barriers to return to prior living situation: level of assist required, weakness, fatigue, fall risk  Recommendations for discharge: ARU  Rationale for recommendations: Pt would benefit from intensive PT at ARU to progress balance and mobility so he can return home safely. Very motivated, good family support, independent at baseline       Entered by: Daylin Carl 05/01/2018 9:52 AM

## 2018-05-01 NOTE — PROGRESS NOTES
"[facility]    Hospitalist Progress Note    Assessment & Plan   Angelito Castellanos is a 89 year old male with hx of chronic a-fib s/p PPM and not on anticoagulation who presents with slurred speech, left facial droop, and left sided weakness, and is being admitted for acute right MCA stroke.      Acute right MCA embolic stroke  Has chronic atrial fibrillation and declined anticoagulation in the past. Initial head CT showed stroke involving the right frontal operculum as well as lacunar infarct within the left basal ganglia of unclear chronicity. CTA head/neck showed focal cutoff of the proximal right M1 segment concerning for embolism, atherosclerosis of the carotid arteries, 2mm aneurysm involving the right ICA, and mild stenosis of the left subclavian artery. Neurology did not feel patient was a candidate for thrombectomy. He continues to have mildly slurred speech, facial droop, and left-sided weakness, but overall this has improved compared to initial onset.   Of note, patient's pacemaker is not MRI compatible so unable to obtain.   - Aspirin 325 PO/325 MI daily for now  - Will continue permissive hypertension at this time but appears to have normal blood pressures without intervention.  IV labetalol and hydralazine available for SBP>220  - TTE/bubble study done and no new changes noted  -repeat CT head 4/30 shows stable cerebrovascular accident    - PT/OT/SLP following  - Neurology following  - patient  Willing to try newer anticoagulants if it does not interfere with alcohol intake,.family would try it after tcu discharge , they will discuss above with his primary care physician   -discharge to tcu when bed available       Chronic atrial fibrillation s/p PPM  [PTA: metoprolol 25 mg BID] As noted above, he has declined anticoagulation in the past stating that he was offered \"rat poison.\"  - on clear liquid diet  Now  Will start back on oral metoprolol     History of pre-diabetes  HgbA1c is 7.0.  " Patient has discussed this with his PCP regarding diet controlled and has elected to not at this age.       # Pain Assessment:  Current Pain Score 5/1/2018   Patient currently in pain? yes   Pain score (0-10) -   Pain location Shoulder   Pain descriptors Kavon hancock pain level was assessed and he currently denies pain.        DVT Prophylaxis: Pneumatic Compression Devices  Code Status: DNR/DNI    Disposition: Expected discharge in when tcu bed available possibly tomorrow   Selene Pratt, DO  Text Page (7am to 6pm)  Total time spend 35 min >50% spend on coordination of care including   Discharge planning ,rehab etc.  Interval History   Patient  Alert , he is asking when he can go home, no nausea, tolerating clear liquid diet  Thickened now, daughter near bedside , they are ok with tcu on discharge and thinks it would be safer for him. Possible discharge to tcu once bed available,   Discussed with  Family and patient , they are willing to try newer anticoagulants if he is stable after therapies following tcu stay , patient  Drinks alcohol regularly and that's why he doesn't like warfarin . Com of pain on right shoulder, has rotator cuff injury in the past , not a new pain as per family.    -Data reviewed today: I reviewed all new labs and imaging results over the last 24 hours. I personally reviewed no images or EKG's today.    Physical Exam   Temp: 97.6  F (36.4  C) Temp src: Oral BP: (!) 159/108 Pulse: 77 Heart Rate: 74 Resp: 22 SpO2: 96 % O2 Device: None (Room air) Oxygen Delivery: 2 LPM  Vitals:    04/29/18 1828 04/30/18 0431 05/01/18 0625   Weight: 71.4 kg (157 lb 6.5 oz) 73.5 kg (162 lb) 70.8 kg (156 lb)     Vital Signs with Ranges  Temp:  [97.4  F (36.3  C)-98.1  F (36.7  C)] 97.6  F (36.4  C)  Pulse:  [73-90] 77  Heart Rate:  [64-86] 74  Resp:  [16-22] 22  BP: (132-159)/() 159/108  SpO2:  [94 %-97 %] 96 %  I/O last 3 completed shifts:  In: 10 [I.V.:10]  Out: -     Constitutional: Alert and sitting   By the side of the bed ,facial palsy present   Respiratory: Clear to auscultation bilaterally, no crackles or wheezing  Cardiovascular: Irregularly irregular, normal S1 and S2, and no murmur noted  GI: Normal bowel sounds, soft, non-distended, non-tender  Skin/Integumen: No rashes, no cyanosis  MSK: No edema   Neuro: left sided weakness+  Medications     - MEDICATION INSTRUCTIONS -       sodium chloride 75 mL/hr at 04/30/18 1138       aspirin EC  325 mg Oral Daily    Or     aspirin  300 mg Rectal Daily     metoprolol  2.5 mg Intravenous Q6H     sodium chloride (PF)  3 mL Intracatheter Q8H       Data     Recent Labs  Lab 04/30/18  2150 04/30/18  1440 04/30/18  0621 04/29/18  1822   WBC  --   --  7.7 7.0   HGB  --   --  14.4 15.4   MCV  --   --  89 89   PLT  --   --  154 162   INR  --   --   --  1.03   NA  --   --  142 141   POTASSIUM  --   --  4.0 4.4   CHLORIDE  --   --  109 107   CO2  --   --  26 27   BUN  --   --  17 21   CR  --   --  1.05 0.95   ANIONGAP  --   --  7 7   EDMUNDO  --   --  8.2* 8.9   GLC  --   --  121* 120*   TROPI <0.015 <0.015 <0.015  --        Imaging:   Recent Results (from the past 24 hour(s))   CT Head w/o Contrast    Narrative    CT OF THE HEAD WITHOUT CONTRAST 4/30/2018 4:43 PM     COMPARISON: Head CT 4/29/2018    HISTORY: Evaluation of infarct burden.    TECHNIQUE: 5 mm thick axial CT images of the head were acquired  without IV contrast material.    FINDINGS: There are new areas of loss of gray-white differentiation  involving the anterior third of the right middle cerebral artery  territory including portions of the right basal ganglia and right  frontal lobe as well as the deep white matter in the left frontal lobe  in the left middle cerebral artery distribution. These areas are  consistent with evolving ischemic infarcts.    There is mild diffuse cerebral volume loss. There are subtle patchy  areas of decreased density in the cerebral white matter bilaterally  that are consistent with  sequela of chronic small vessel ischemic  disease disease. The ventricles and basal cisterns are within normal  limits in configuration given the degree of cerebral volume loss.   There is no midline shift. There are no extra-axial fluid collections.    No intracranial hemorrhage or mass.    The visualized paranasal sinuses are well-aerated. There is no  mastoiditis. There are no fractures of the visualized bones.      Impression    IMPRESSION:   1. Evolving ischemic infarcts in the right and left middle cerebral  artery territories.  2. Diffuse cerebral volume loss and cerebral white matter changes  consistent with chronic small vessel ischemic disease.        Radiation dose for this scan was reduced using automated exposure  control, adjustment of the mA and/or kV according to patient size, or  iterative reconstruction technique    TIFFANIE SILVA MD

## 2018-05-01 NOTE — PLAN OF CARE
Problem: Patient Care Overview  Goal: Plan of Care/Patient Progress Review  Outcome: No Change  A&O3, disoriented to time. Neuros - L facial droop, garbled/horse speech. VSS. Tele V paced on demand. Honey thick diet. Up with A2, GB, W. C/O moderate pain, refuesd Tylenol NOC. Plan PT/OT/SLP. Neuro to follow.

## 2018-05-02 ENCOUNTER — APPOINTMENT (OUTPATIENT)
Dept: OCCUPATIONAL THERAPY | Facility: CLINIC | Age: 83
DRG: 065 | End: 2018-05-02
Payer: MEDICARE

## 2018-05-02 ENCOUNTER — APPOINTMENT (OUTPATIENT)
Dept: PHYSICAL THERAPY | Facility: CLINIC | Age: 83
DRG: 065 | End: 2018-05-02
Payer: MEDICARE

## 2018-05-02 ENCOUNTER — APPOINTMENT (OUTPATIENT)
Dept: GENERAL RADIOLOGY | Facility: CLINIC | Age: 83
DRG: 065 | End: 2018-05-02
Attending: INTERNAL MEDICINE
Payer: MEDICARE

## 2018-05-02 ENCOUNTER — APPOINTMENT (OUTPATIENT)
Dept: SPEECH THERAPY | Facility: CLINIC | Age: 83
DRG: 065 | End: 2018-05-02
Payer: MEDICARE

## 2018-05-02 PROCEDURE — 97535 SELF CARE MNGMENT TRAINING: CPT | Mod: GO | Performed by: OCCUPATIONAL THERAPY ASSISTANT

## 2018-05-02 PROCEDURE — A9270 NON-COVERED ITEM OR SERVICE: HCPCS | Mod: GY | Performed by: INTERNAL MEDICINE

## 2018-05-02 PROCEDURE — 74230 X-RAY XM SWLNG FUNCJ C+: CPT

## 2018-05-02 PROCEDURE — 99232 SBSQ HOSP IP/OBS MODERATE 35: CPT | Performed by: PSYCHIATRY & NEUROLOGY

## 2018-05-02 PROCEDURE — 40000133 ZZH STATISTIC OT WARD VISIT: Performed by: OCCUPATIONAL THERAPY ASSISTANT

## 2018-05-02 PROCEDURE — 40000225 ZZH STATISTIC SLP WARD VISIT: Performed by: SPEECH-LANGUAGE PATHOLOGIST

## 2018-05-02 PROCEDURE — 99232 SBSQ HOSP IP/OBS MODERATE 35: CPT | Performed by: INTERNAL MEDICINE

## 2018-05-02 PROCEDURE — 97530 THERAPEUTIC ACTIVITIES: CPT | Mod: GP

## 2018-05-02 PROCEDURE — 97116 GAIT TRAINING THERAPY: CPT | Mod: GP

## 2018-05-02 PROCEDURE — 25000125 ZZHC RX 250: Performed by: INTERNAL MEDICINE

## 2018-05-02 PROCEDURE — 25000132 ZZH RX MED GY IP 250 OP 250 PS 637: Mod: GY | Performed by: INTERNAL MEDICINE

## 2018-05-02 PROCEDURE — 40000193 ZZH STATISTIC PT WARD VISIT

## 2018-05-02 PROCEDURE — 25000128 H RX IP 250 OP 636: Performed by: INTERNAL MEDICINE

## 2018-05-02 PROCEDURE — 12000000 ZZH R&B MED SURG/OB

## 2018-05-02 PROCEDURE — 92526 ORAL FUNCTION THERAPY: CPT | Mod: GN | Performed by: SPEECH-LANGUAGE PATHOLOGIST

## 2018-05-02 PROCEDURE — 92611 MOTION FLUOROSCOPY/SWALLOW: CPT | Mod: GN | Performed by: SPEECH-LANGUAGE PATHOLOGIST

## 2018-05-02 RX ORDER — BARIUM SULFATE 400 MG/ML
SUSPENSION ORAL ONCE
Status: COMPLETED | OUTPATIENT
Start: 2018-05-02 | End: 2018-05-02

## 2018-05-02 RX ADMIN — ASPIRIN 325 MG: 325 TABLET, DELAYED RELEASE ORAL at 08:53

## 2018-05-02 RX ADMIN — ACETAMINOPHEN 650 MG: 325 TABLET ORAL at 17:15

## 2018-05-02 RX ADMIN — METOROPROLOL TARTRATE 5 MG: 5 INJECTION, SOLUTION INTRAVENOUS at 18:48

## 2018-05-02 RX ADMIN — METOPROLOL TARTRATE 25 MG: 25 TABLET ORAL at 20:56

## 2018-05-02 RX ADMIN — METOPROLOL TARTRATE 25 MG: 25 TABLET ORAL at 08:53

## 2018-05-02 RX ADMIN — SODIUM CHLORIDE: 9 INJECTION, SOLUTION INTRAVENOUS at 00:01

## 2018-05-02 RX ADMIN — BARIUM SULFATE 15 ML: 400 SUSPENSION ORAL at 09:38

## 2018-05-02 ASSESSMENT — VISUAL ACUITY
OU: BASELINE;GLASSES
OU: BASELINE
OU: BASELINE;GLASSES

## 2018-05-02 ASSESSMENT — ACTIVITIES OF DAILY LIVING (ADL)
ADLS_ACUITY_SCORE: 16
ADLS_ACUITY_SCORE: 17
ADLS_ACUITY_SCORE: 17
ADLS_ACUITY_SCORE: 16
ADLS_ACUITY_SCORE: 16
ADLS_ACUITY_SCORE: 17

## 2018-05-02 NOTE — PROGRESS NOTES
05/02/18 0935   General Information   Onset Date 04/29/18   Start of Care Date 05/02/18   Referring Physician Dr. Zaidi   Patient Profile Review/OT: Additional Occupational Profile Info See Profile for full history and prior level of function   Patient/Family Goals Statement Patient is hungry and wants solid foods.    Swallowing Evaluation Bedside swallow evaluation   Behaviorial Observations Alert   Mode of current nutrition Oral diet   Type of oral diet Honey - thick liquid  (Clears)   Respiratory Status Room air   Comments Per MD note: Angelito Castellanos is a 89 year old male with hx of chronic a-fib s/p PPM and not on anticoagulation who presents with slurred speech, left facial droop, and left sided weakness, and is being admitted for acute right MCA stroke.    VFSS Evaluation   VFSS Additional Documentation Yes   VFSS Eval: Radiology   Radiologist Dr. Heath   Views Taken left lateral   Physical Location of Procedure [facility]   VFSS Eval: Thin Liquid Texture Trial   Mode of Presentation, Thin Liquid cup;spoon;self-fed;fed by clinician   Order of Presentation 2 3 4 6 8   Preparatory Phase Poor bolus control   Oral Phase, Thin Liquid Premature pharyngeal entry;Residue in oral cavity;Poor AP movement   Pharyngeal Phase, Thin Liquid Delayed swallow reflex;Residue in valleculae   Rosenbek's Penetration Aspiration Scale: Thin Liquid Trial Results 3 - contrast remains above the vocal cords, visible residue remains (penetration)   Diagnostic Statement Deep laryngeal penetration x1.    VFSS Eval: Nectar Thick Liquid Texture Trial   Mode of Presentation, Nectar spoon;cup;self-fed;fed by clinician   Order of Presentation 1 10   Preparatory Phase Poor bolus control   Oral Phase, [place] Poor AP movement;Residue in oral cavity;Premature pharyngeal entry   Pharyngeal Phase, Nectar Delayed swallow reflex;Residue in valleculae   Rosenbek's Penetration Aspiration Scale: Nectar-Thick Liquid Trial Results 1 - no aspiration, contrast  does not enter airway   Diagnostic Statement Mild residue that sits in front of the open airway. Thin extension from the underside of the epiglottis to the pyriform sinuses.    VFSS Eval: Puree Solid Texture Trial   Mode of Presentation, Puree spoon;fed by clinician   Order of Presentation 5 9   Preparatory Phase WFL   Oral Phase, Puree Poor AP movement;Residue in oral cavity;Premature pharyngeal entry   Pharyngeal Phase, Puree Delayed swallow reflex;Residue in valleculae   Rosenbek's Penetration Aspiration Scale: Puree Food Trial Results 1 - no aspiration, contrast does not enter airway   Diagnostic Statement Minima to mild oral and vallecualr residue.    VFSS Eval: Semisolid Texture Trial   Mode of Presentation, Semisolid spoon;fed by clinician   Order of Presentation 7   Preparatory Phase Insufficient mastication;Poor bolus control   Oral Phase, Semisolid Poor AP movement;Residue in oral cavity;Premature pharyngeal entry   Pharyngeal Phase, Semisolid Delayed swallow reflex;Residue in valleculae   Rosenbek's Penetration Aspiration Scale: Semisolid Food Trial Results 1 - no aspiration, contrast does not enter airway   Diagnostic Statement Moderate oral and vallecular residue.    General Therapy Interventions   Planned Therapy Interventions Dysphagia Treatment   Dysphagia treatment Oropharyngeal exercise training;Modified diet education;Instruction of safe swallow strategies   Swallow Eval: Clinical Impressions   Skilled Criteria for Therapy Intervention Skilled criteria met.  Treatment indicated.   Functional Assessment Scale (FAS) 3   Dysphagia Outcome Severity Scale (FELICITAS) Level 3 - FELICITAS   Diet texture recommendations Dysphagia diet level 1;Nectar thick liquids   Recommended Feeding/Eating Techniques alternate between small bites and sips of food/liquid;check mouth frequently for oral residue/pocketing;hard swallow w/ each bite or sip;maintain upright posture during/after eating for 30 mins;no straws;small  sips/bites   Therapy Frequency daily   Predicted Duration of Therapy Intervention (days/wks) 5 days   Anticipated Discharge Disposition inpatient rehabilitation facility   Risks and Benefits of Treatment have been explained. Yes   Patient, family and/or staff in agreement with Plan of Care Yes   Clinical Impression Comments Patient presents with moderate oral and pharyngeal dysphagia on today's study secondary to a right MCA stroke. Deficits include; reduced bolus control, AP transport, premature entry, delayed swallow response and incomplete epiglottic inversion. These dysfunctions resulted in premature spillage of thin liquids to the pyriform sinuses, with one episode of deep laryngeal penetration before the swallow (after a semi-solid.) Nectar thick liquids were delayed slightly over the epiglottis with mild BOT and vallecular residue, no penetration/aspiration. Mildly reduced AP movement of the bolus with pudding and delay to the valleculae with minimal/mild BOT and vallecular residue. Prolonged mastication of a semi-solid, decreased bolus formation and AP transport. Delayed to the valleculae with moderate oral and vallecular residue. Recommend: 1. Dysphagia Diet level 1 with nectar thick liquids.     Total Evaluation Time   Total Evaluation Time (Minutes) 20

## 2018-05-02 NOTE — PLAN OF CARE
Problem: Patient Care Overview  Goal: Plan of Care/Patient Progress Review  PT-  Pt declined PT at time of appt.  Just finished with Speech therapy and is just starting to eat lunch and wants more time.  Requested therapist return in PM.

## 2018-05-02 NOTE — PROGRESS NOTES
"Neuroscience Intensive Care Progress Note    2018     24 hour events:  Stable, continues to improve neurologically.    24 Hour Vital Signs Summary:  Temperatures:  Current - Temp: 98  F (36.7  C); Max - Temp  Av  F (36.7  C)  Min: 97.7  F (36.5  C)  Max: 98.2  F (36.8  C)  Respiration range: Resp  Av.3  Min: 16  Max: 18  Pulse range: Pulse  Av  Min: 76  Max: 76  Blood pressure range: Systolic (24hrs), Av , Min:100 , Max:165   ; Diastolic (24hrs), Av, Min:63, Max:93    Pulse oximetry range: SpO2  Av.8 %  Min: 93 %  Max: 96 %    Ventilator Settings  Resp: 16      Intake/Output Summary (Last 24 hours) at 18 1628  Last data filed at 18 1200   Gross per 24 hour   Intake             1789 ml   Output               75 ml   Net             1714 ml            Current Medications:    aspirin EC  325 mg Oral Daily    Or     aspirin  300 mg Rectal Daily     metoprolol tartrate (LOPRESSOR) tablet 25 mg  25 mg Oral BID     sodium chloride (PF)  3 mL Intracatheter Q8H       PRN Medications:  acetaminophen, bisacodyl, hydrALAZINE, labetalol, lidocaine 4%, lidocaine (buffered or not buffered), magnesium sulfate, - MEDICATION INSTRUCTIONS -, metoprolol, naloxone, ondansetron **OR** ondansetron, polyethylene glycol, potassium chloride, potassium chloride with lidocaine, potassium chloride, potassium chloride, potassium chloride, prochlorperazine **OR** prochlorperazine **OR** prochlorperazine, senna-docusate **OR** senna-docusate, sodium chloride, sodium chloride (PF)    Infusions:    - MEDICATION INSTRUCTIONS -       sodium chloride 75 mL/hr at 18 0001       No Known Allergies    Physical Examination:  /67 (BP Location: Right arm)  Pulse 76  Temp 98  F (36.7  C) (Oral)  Resp 16  Ht 1.778 m (5' 10\")  Wt 57.1 kg (125 lb 12.8 oz)  SpO2 96%  BMI 18.05 kg/m2  Alert, oriented to place and person, follows simple command, dysarthric, PERRLA, EOMI, symmetric face, bilateral " upper extremity 4/5 bilateral lower extremity 4/5, he is using his left hand more often compared to right side, minimal to none visual or sensory neglect.         Labs/Studies:  Recent Labs   Lab Test  04/30/18   0621  04/29/18   1822   NA  142  141   POTASSIUM  4.0  4.4   CHLORIDE  109  107   CO2  26  27   ANIONGAP  7  7   GLC  121*  120*   BUN  17  21   CR  1.05  0.95   EDMUNDO  8.2*  8.9   WBC  7.7  7.0   RBC  4.68  4.91   HGB  14.4  15.4   PLT  154  162       Recent Labs   Lab Test  04/29/18   1822   INR  1.03   PTT  31         No lab results found in last 7 days.        Imaging:  reviewed     Assessment/Plan    Angelito Castellanos is a 89 year old h/o atrial fibrillation,  not on anticoagulation presented (chose not to) with right M1 occlusion with an established moderate to large size infarct.  Transthoracic echocardiogram does not show evidence of any acute thrombus.     Neurologically, overall he is doing well compared to the infarct size on his scans.  Moving forward he would benefit from anticoagulation.  The patient initially did not want to be on anticoagulation, however, he is open for newer oral anticoagulant medications.  The risks and benefits were discussed with sister at bedside.     Plan:  -Consider starting apixaban after 2 weeks  -Start Lipitor 20 mg daily  -PT/OT/speech  -Outpatient neurology follow-up  -Blood pressure can slowly be controlled over next 1-2 weeks with long-term blood pressure goal of normotension  -Can be discharged when ready from medical standpoint     Plan discussed with Dr. Rosaura Potter   Fellow

## 2018-05-02 NOTE — PLAN OF CARE
Problem: Patient Care Overview  Goal: Plan of Care/Patient Progress Review  Discharge Planner PT   Patient plan for discharge: home  Current status: Pt requires Rosario for bed mobility, transfers and gait 40' x 2 with a FWW. MaxA for clothing management with toileting.  Barriers to return to prior living situation: level of assist required, fall risk, decreased awareness of deficits, home alone for periods of time  Recommendations for discharge: ARU  Rationale for recommendations: Pt would benefit from intensive PT at ARU to progress balance and mobility so he can return home. Very motivated, previously independent, good family support       Entered by: Daylin Carl 05/02/2018 3:46 PM

## 2018-05-02 NOTE — PROGRESS NOTES
"[facility]    Hospitalist Progress Note    Assessment & Plan   Angelito Castellanos is a 89 year old male with hx of chronic a-fib s/p PPM and not on anticoagulation who presents with slurred speech, left facial droop, and left sided weakness, and is being admitted for acute right MCA stroke.      Acute right MCA embolic stroke  Has chronic atrial fibrillation and declined anticoagulation in the past. Initial head CT showed stroke involving the right frontal operculum as well as lacunar infarct within the left basal ganglia of unclear chronicity. CTA head/neck showed focal cutoff of the proximal right M1 segment concerning for embolism, atherosclerosis of the carotid arteries, 2mm aneurysm involving the right ICA, and mild stenosis of the left subclavian artery. Neurology did not feel patient was a candidate for thrombectomy. He continues to have mildly slurred speech, facial droop, and left-sided weakness, but overall this has improved compared to initial onset.   Of note, patient's pacemaker is not MRI compatible so unable to obtain.   - Aspirin 325 PO/325 MI daily for now  - Will continue permissive hypertension at this time but appears to have normal blood pressures without intervention.  IV labetalol and hydralazine available for SBP>220  - TTE/bubble study done and no new changes noted  -repeat CT head 4/30 shows stable cerebrovascular accident    - PT/OT/SLP following  - Neurology following  - patient  Willing to try newer anticoagulants if it does not interfere with alcohol intake,.family would try it after tcu discharge , they will discuss above with his primary care physician   -discharge to acute rehab in 1-2 days, discussed with the family and social work.  -His diet has been started as dysphagia level 1,honey thick , tolerating it well     Chronic atrial fibrillation s/p PPM  [PTA: metoprolol 25 mg BID] As noted above, he has declined anticoagulation in the past stating that he was offered \"rat " "poison.\"  -back on metformin.     History of pre-diabetes  HgbA1c is 7.0.  Patient has discussed this with his PCP regarding diet controlled and has elected to not at this age.       # Pain Assessment:  Current Pain Score 5/2/2018   Patient currently in pain? denies   Pain score (0-10) -   Pain location -   Pain descriptors -   Angelito hancock pain level was assessed and he currently denies pain.        DVT Prophylaxis: Pneumatic Compression Devices  Code Status: DNR/DNI    Disposition: Expected discharge to ARU  tomorrow .  Selene Pratt, DO  Text Page (7am to 6pm)  Acute rehab  Interval History   He is back from his speech evaluation, daughter near bedside, he had just been to sleep, he has been started on dysphagia level 1 diet and he had  A good lunch already.  No nausea,  Daughter mentions that the patient is eager to get more therapy and get stronger.  Discussed about acute rehab transfer if possible by tomorrow.    -Data reviewed today: I reviewed all new labs and imaging results over the last 24 hours. I personally reviewed no images or EKG's today.    Physical Exam   Temp: 97.8  F (36.6  C) Temp src: Axillary BP: 100/63 Pulse: 76 Heart Rate: 59 Resp: 18 SpO2: 94 % O2 Device: None (Room air)    Vitals:    04/30/18 0431 05/01/18 0625 05/02/18 0506   Weight: 73.5 kg (162 lb) 70.8 kg (156 lb) 57.1 kg (125 lb 12.8 oz)     Vital Signs with Ranges  Temp:  [97.7  F (36.5  C)-98.2  F (36.8  C)] 97.8  F (36.6  C)  Pulse:  [76] 76  Heart Rate:  [] 59  Resp:  [15-18] 18  BP: (100-169)/() 100/63  SpO2:  [93 %-96 %] 94 %  I/O last 3 completed shifts:  In: 2581 [P.O.:200; I.V.:2381]  Out: 75 [Urine:75]    Constitutional: Very sleepy today, back from  speech eval,facial palsy present   Respiratory: Clear to auscultation bilaterally, no crackles or wheezing  Cardiovascular: Irregularly irregular, normal S1 and S2, and no murmur noted  GI: Normal bowel sounds, soft, non-distended, non-tender  Skin/Integumen: No rashes, " no cyanosis  MSK: No edema   Neuro: left sided weakness+  Medications     - MEDICATION INSTRUCTIONS -       sodium chloride 75 mL/hr at 05/02/18 0001       aspirin EC  325 mg Oral Daily    Or     aspirin  300 mg Rectal Daily     metoprolol tartrate (LOPRESSOR) tablet 25 mg  25 mg Oral BID     sodium chloride (PF)  3 mL Intracatheter Q8H       Data     Recent Labs  Lab 04/30/18  2150 04/30/18  1440 04/30/18  0621 04/29/18  1822   WBC  --   --  7.7 7.0   HGB  --   --  14.4 15.4   MCV  --   --  89 89   PLT  --   --  154 162   INR  --   --   --  1.03   NA  --   --  142 141   POTASSIUM  --   --  4.0 4.4   CHLORIDE  --   --  109 107   CO2  --   --  26 27   BUN  --   --  17 21   CR  --   --  1.05 0.95   ANIONGAP  --   --  7 7   EDMUNDO  --   --  8.2* 8.9   GLC  --   --  121* 120*   TROPI <0.015 <0.015 <0.015  --        Imaging:   Recent Results (from the past 24 hour(s))   XR Video Swallow w/o Esophagram    Narrative    VIDEO SWALLOWING EVALUATION  5/2/2018 9:41 AM     HISTORY: Right CVA.     COMPARISON: None.    FLUOROSCOPY TIME: 2.0 minutes     Number of cine runs: 9    FINDINGS:    Thin: Premature spill to the piriforms. One episode of deep  penetration. Otherwise normal.    Nectar: Premature spill to the vallecula. Otherwise normal.    Honey: Not administered.    Pudding: Normal.    Semisolid: Moderate vallecular residue. Otherwise normal.    Solid: Not administered.    This study only includes the cervical esophagus.

## 2018-05-02 NOTE — PLAN OF CARE
Problem: Patient Care Overview  Goal: Plan of Care/Patient Progress Review  VSS. Tele occas pace/afib. Metoprolol given/tachy. Disoriented to time. Garbled speech. Slight L facial droop.  L weaker than R U/L. Clear liquid diet with honey liquids. Crush meds. Up with assist of 1, walkerSRI. Turn/repo q 2 hours. Incontinent of urine at times, uses urinal. Blanchable buttocks, barrier cream applied.  IVF. Denies pain. Plan to discharge to ARU pending placement.

## 2018-05-02 NOTE — PLAN OF CARE
Problem: Patient Care Overview  Goal: Plan of Care/Patient Progress Review  Discharge Planner OT   Patient plan for discharge: none stated  Current status: Pt mod A supine to sit EOB, pt had difficulty with maintaining sitting balance required cues and mod A to continually correct. Min A of 2 sit to stand, cues to widen PAVEL and min/mod  A of 2 to amb to bathroom, toilet transfer with cues and mod A, max A clothing management, pt with improved balance and walker safety when amb from bathroom, cues and max A for safety with keeping walker close and in front of self when turning to complete transfer to chair for breakfast.   Barriers to return to prior living situation: Needs A with all ADL and mobility  Recommendations for discharge: ARU per plan established by the Occupational Therapist  Rationale for recommendations: Motivated to get stronger and return to I. Has supportive daughters       Entered by: Rajwinder Nunes 05/02/2018 10:50 AM

## 2018-05-02 NOTE — PLAN OF CARE
Problem: Patient Care Overview  Goal: Plan of Care/Patient Progress Review  Discharge Planner SLP   Patient plan for discharge: Patient did not state.   Current status: Video swallow study completed. Patient presents with moderate oral and pharyngeal dysphagia on today's study secondary to a right MCA stroke. Deficits include; reduced bolus control, AP transport, premature entry, delayed swallow response and incomplete epiglottic inversion. These dysfunctions resulted in premature spillage of thin liquids to the pyriform sinuses, with one episode of deep laryngeal penetration before the swallow (after a semi-solid.) Nectar thick liquids were delayed slightly over the epiglottis with mild BOT and vallecular residue, no penetration/aspiration. Mildly reduced AP movement of the bolus with pudding and delay to the valleculae with minimal/mild BOT and vallecular residue. Prolonged mastication of a semi-solid, decreased bolus formation and AP transport. Delayed to the valleculae with moderate oral and vallecular residue. Recommend: 1. Dysphagia Diet level 1 with nectar thick liquids. 2. Up in a chair for all meals, liquids by spoon or small sips via the cup, hard swallow, alternate liquids/solids. Hold diet if overt Sx of aspiration present or changes in his respiratory status. 3. ST will f/u for diet tolerance, swallow strategies, oral/pharyngeal strengthening and tolerance of ice chips for between meals.   Barriers to return to prior living situation: Deconditioning from the stroke.  Recommendations for discharge: ARC  Rationale for recommendations: Continue ST at [facility] for diet tolerance, swallow strategies, oral/pharyngeal strengthening and free water protocol. Patient is motivated to improve swallow function and has good family support.        Entered by: Idania Hough 05/02/2018 10:15 AM

## 2018-05-02 NOTE — PLAN OF CARE
Alert and oriented x4. Neuros L side slight droop, L side weakness, Baseline word finding difficulties . VSS. Tele on demand v pacing. DD1 diet with nectar thick liquids. Up with 1 and walker. Denies pain. Plan to discharge to ARU when available.

## 2018-05-03 ENCOUNTER — APPOINTMENT (OUTPATIENT)
Dept: PHYSICAL THERAPY | Facility: CLINIC | Age: 83
DRG: 065 | End: 2018-05-03
Payer: MEDICARE

## 2018-05-03 ENCOUNTER — APPOINTMENT (OUTPATIENT)
Dept: SPEECH THERAPY | Facility: CLINIC | Age: 83
DRG: 065 | End: 2018-05-03
Payer: MEDICARE

## 2018-05-03 ENCOUNTER — HOSPITAL ENCOUNTER (INPATIENT)
Facility: CLINIC | Age: 83
LOS: 19 days | Discharge: NURSING, REHAB OR LONG-TERM CARE | DRG: 057 | End: 2018-05-22
Attending: PHYSICAL MEDICINE & REHABILITATION | Admitting: PHYSICAL MEDICINE & REHABILITATION
Payer: MEDICARE

## 2018-05-03 VITALS
RESPIRATION RATE: 16 BRPM | DIASTOLIC BLOOD PRESSURE: 72 MMHG | HEIGHT: 70 IN | SYSTOLIC BLOOD PRESSURE: 124 MMHG | WEIGHT: 124.6 LBS | OXYGEN SATURATION: 98 % | TEMPERATURE: 98.1 F | HEART RATE: 72 BPM | BODY MASS INDEX: 17.84 KG/M2

## 2018-05-03 DIAGNOSIS — I63.412 CEREBRAL INFARCTION DUE TO EMBOLISM OF LEFT MIDDLE CEREBRAL ARTERY (H): ICD-10-CM

## 2018-05-03 DIAGNOSIS — I63.9 ACUTE EMBOLIC STROKE (H): Primary | ICD-10-CM

## 2018-05-03 DIAGNOSIS — K59.00 CONSTIPATION, UNSPECIFIED CONSTIPATION TYPE: ICD-10-CM

## 2018-05-03 DIAGNOSIS — I48.91 ATRIAL FIBRILLATION, UNSPECIFIED TYPE (H): ICD-10-CM

## 2018-05-03 DIAGNOSIS — R33.9 URINARY RETENTION WITH INCOMPLETE BLADDER EMPTYING: ICD-10-CM

## 2018-05-03 PROCEDURE — 92526 ORAL FUNCTION THERAPY: CPT | Mod: GN | Performed by: SPEECH-LANGUAGE PATHOLOGIST

## 2018-05-03 PROCEDURE — A9270 NON-COVERED ITEM OR SERVICE: HCPCS | Mod: GY | Performed by: INTERNAL MEDICINE

## 2018-05-03 PROCEDURE — 40000225 ZZH STATISTIC SLP WARD VISIT: Performed by: SPEECH-LANGUAGE PATHOLOGIST

## 2018-05-03 PROCEDURE — 25000132 ZZH RX MED GY IP 250 OP 250 PS 637: Mod: GY | Performed by: PHYSICAL MEDICINE & REHABILITATION

## 2018-05-03 PROCEDURE — A9270 NON-COVERED ITEM OR SERVICE: HCPCS | Mod: GY | Performed by: PHYSICAL MEDICINE & REHABILITATION

## 2018-05-03 PROCEDURE — 25000132 ZZH RX MED GY IP 250 OP 250 PS 637: Mod: GY | Performed by: INTERNAL MEDICINE

## 2018-05-03 PROCEDURE — 99239 HOSP IP/OBS DSCHRG MGMT >30: CPT | Performed by: INTERNAL MEDICINE

## 2018-05-03 PROCEDURE — 40000193 ZZH STATISTIC PT WARD VISIT

## 2018-05-03 PROCEDURE — 12800006 ZZH R&B REHAB

## 2018-05-03 PROCEDURE — 25000125 ZZHC RX 250: Performed by: PAIN MEDICINE

## 2018-05-03 PROCEDURE — 25000128 H RX IP 250 OP 636: Performed by: INTERNAL MEDICINE

## 2018-05-03 PROCEDURE — 97116 GAIT TRAINING THERAPY: CPT | Mod: GP

## 2018-05-03 RX ORDER — ASPIRIN 325 MG
325 TABLET, DELAYED RELEASE (ENTERIC COATED) ORAL DAILY
Qty: 40 TABLET | Status: ON HOLD | DISCHARGE
Start: 2018-05-04 | End: 2018-05-17

## 2018-05-03 RX ORDER — POLYETHYLENE GLYCOL 3350 17 G/17G
17 POWDER, FOR SOLUTION ORAL DAILY PRN
Status: DISCONTINUED | OUTPATIENT
Start: 2018-05-03 | End: 2018-05-22 | Stop reason: HOSPADM

## 2018-05-03 RX ORDER — AMOXICILLIN 250 MG
1-2 CAPSULE ORAL 2 TIMES DAILY PRN
Status: DISCONTINUED | OUTPATIENT
Start: 2018-05-03 | End: 2018-05-22 | Stop reason: HOSPADM

## 2018-05-03 RX ORDER — ACETAMINOPHEN 325 MG/1
650 TABLET ORAL 3 TIMES DAILY
Status: DISCONTINUED | OUTPATIENT
Start: 2018-05-03 | End: 2018-05-14

## 2018-05-03 RX ORDER — BISACODYL 10 MG
10 SUPPOSITORY, RECTAL RECTAL DAILY PRN
Qty: 30 SUPPOSITORY | DISCHARGE
Start: 2018-05-03 | End: 2018-06-20

## 2018-05-03 RX ORDER — ACETAMINOPHEN 325 MG/1
650 TABLET ORAL 3 TIMES DAILY
Qty: 100 TABLET | Status: ON HOLD | DISCHARGE
Start: 2018-05-03 | End: 2018-05-17

## 2018-05-03 RX ORDER — BISACODYL 10 MG
10 SUPPOSITORY, RECTAL RECTAL DAILY PRN
Status: DISCONTINUED | OUTPATIENT
Start: 2018-05-03 | End: 2018-05-09

## 2018-05-03 RX ORDER — ATORVASTATIN CALCIUM 10 MG/1
20 TABLET, FILM COATED ORAL DAILY
Status: DISCONTINUED | OUTPATIENT
Start: 2018-05-03 | End: 2018-05-22 | Stop reason: HOSPADM

## 2018-05-03 RX ORDER — ATORVASTATIN CALCIUM 20 MG/1
20 TABLET, FILM COATED ORAL DAILY
Qty: 90 TABLET | Refills: 1 | DISCHARGE
Start: 2018-05-03

## 2018-05-03 RX ORDER — METOPROLOL TARTRATE 25 MG/1
25 TABLET, FILM COATED ORAL 2 TIMES DAILY
Qty: 60 TABLET | Status: ON HOLD | DISCHARGE
Start: 2018-05-03 | End: 2018-05-17

## 2018-05-03 RX ORDER — METOPROLOL TARTRATE 25 MG/1
25 TABLET, FILM COATED ORAL 2 TIMES DAILY
Status: DISCONTINUED | OUTPATIENT
Start: 2018-05-03 | End: 2018-05-17

## 2018-05-03 RX ADMIN — LIDOCAINE HYDROCHLORIDE 5 ML: 20 JELLY TOPICAL at 23:01

## 2018-05-03 RX ADMIN — ACETAMINOPHEN 650 MG: 325 TABLET ORAL at 17:15

## 2018-05-03 RX ADMIN — ACETAMINOPHEN 650 MG: 325 TABLET ORAL at 21:13

## 2018-05-03 RX ADMIN — METOPROLOL TARTRATE 25 MG: 25 TABLET, FILM COATED ORAL at 21:13

## 2018-05-03 RX ADMIN — ATORVASTATIN CALCIUM 20 MG: 10 TABLET, FILM COATED ORAL at 21:13

## 2018-05-03 RX ADMIN — SODIUM CHLORIDE: 9 INJECTION, SOLUTION INTRAVENOUS at 03:32

## 2018-05-03 RX ADMIN — ACETAMINOPHEN 650 MG: 325 TABLET ORAL at 08:37

## 2018-05-03 RX ADMIN — METOPROLOL TARTRATE 25 MG: 25 TABLET ORAL at 08:37

## 2018-05-03 RX ADMIN — ASPIRIN 325 MG: 325 TABLET, DELAYED RELEASE ORAL at 08:37

## 2018-05-03 ASSESSMENT — ACTIVITIES OF DAILY LIVING (ADL)
TOILETING: 0-->INDEPENDENT
FALL_HISTORY_WITHIN_LAST_SIX_MONTHS: NO
RETIRED_EATING: 0-->INDEPENDENT
COGNITION: 0 - NO COGNITION ISSUES REPORTED
ADLS_ACUITY_SCORE: 17
RETIRED_COMMUNICATION: 0-->UNDERSTANDS/COMMUNICATES WITHOUT DIFFICULTY
DRESS: 0-->INDEPENDENT
ADLS_ACUITY_SCORE: 17
AMBULATION: 0-->INDEPENDENT
ADLS_ACUITY_SCORE: 17
BATHING: 0-->INDEPENDENT
TRANSFERRING: 0-->INDEPENDENT
ADLS_ACUITY_SCORE: 17
SWALLOWING: 0-->SWALLOWS FOODS/LIQUIDS WITHOUT DIFFICULTY

## 2018-05-03 ASSESSMENT — VISUAL ACUITY
OU: NORMAL ACUITY
OU: NORMAL ACUITY

## 2018-05-03 NOTE — PLAN OF CARE
Problem: Patient Care Overview  Goal: Plan of Care/Patient Progress Review  Discharge Planner SLP   Patient plan for discharge: Patient did not state.   Current status: Patient seen for swallowing session for diet tolerance of a DDL 1 with nectar thick liquids. Patient was up in the chair and able to feed himself. Education/training provided on swallow strategies to decrease the risk for aspiration, he needed moderate cueing throughout the meal. He continues to demonstrate decreased bolus formation and AP movement of the bolus with a delayed swallow reponse. Vocal change noted x1, but able to clear with a hard throat clear and swallow. Recommend: 1. Continue on the DDL 1 with nectar thick liquids. 2. Up in a chair for al meals, small bites/sips, hard swallow, and alternate liquids/solids. 2. SLP will see daily for oral/pharyngeal strengthening, ice chip tolerance and swallow strategy training.   Barriers to return to prior living situation: Deconditioning and acuity of his illness.   Recommendations for discharge: ARC  Rationale for recommendations: Will need on going ST at [facility] for swallowing to improve strength, swallow strategy training, diet advancement and free water protocol.        Entered by: Idania Hough 05/03/2018 9:41 AM

## 2018-05-03 NOTE — PLAN OF CARE
Problem: Patient Care Overview  Goal: Plan of Care/Patient Progress Review  OT: Pt discharged prior to OT session today.     Occupational Therapy Discharge Summary    Reason for therapy discharge:    Discharged to acute rehabilitation facility.    Progress towards therapy goal(s). See goals on Care Plan in [facility] electronic health record for goal details.  Goals not met.  Barriers to achieving goals:   discharge from facility.    Therapy recommendation(s):    Continued skilled OT to address I/ADLs

## 2018-05-03 NOTE — PROGRESS NOTES
Care Transition Initial Assessment - ARAVIND  Reason For Consult: discharge planning  Met with: PATIENT,FAMILY    Active Problems:    Acute embolic stroke (H)       DATA  Lives With: child(jeanette), adult (daughter)  Living Arrangements: house  Description of Support System: Supportive, Involved  Who is your support system?: Children  Identified issues/concerns regarding health management: Need for increase in services at time of discharge.   Resources List: Acute Rehab        Transportation Available: van, wheelchair accessible  ASSESSMENT  Cognitive Status:  Unable to assess; patient was asleep  Concerns to be addressed: Discharge planning.    SW reviewed chart and attempted to meet with patient to discuss discharge plan.  Patient was admitted 4/29/18 with Acute embolic stroke.  Anticipated discharge date: 5/3.  SW introduced self and role to patient's daughter, Ethan, as patient was asleep.  We reviewed therapy recommendation for:  ARU.  Family agrees with plan.  It has been charted patient was aware of plan for ARU and had also agreed with this. Family asked SW to set up transport to ARU via [facility], accepting all costs.  ARAVIND received update patient has been accepted to [facility] for today, 5/3, thus w/c transport was arranged for 1400, per recommendation from ARU liaison.  Awaiting orders.     PLAN  Financial costs for the patient includes:  Transportation costs, if applicable .  Patient given options and choices for discharge: Yes .  Patient/family is agreeable to the plan?  YES  Patient Goals and Preferences: Discharge to ARU.  Patient anticipates discharging to:  ARU .      Continue to assist to ensure a safe discharge plan.    RICHARD Hernandez      UPDATE@9229:  Discharge orders are in;  No further SW interventions anticipated at this time. Will be available if needs arise.    UPDATE@0560:  ARAVIND received call from ARU liaison asking for hospitalist signature on Discharge summary. ARAVIND paged physician for immediate assistance.

## 2018-05-03 NOTE — IP AVS SNAPSHOT
` `            [facility]: [phone]                 INTERAGENCY TRANSFER FORM - NOTES (H&P, Discharge Summary, Consults, Procedures, Therapies)   5/3/2018                    Hospital Admission Date: 5/3/2018  ANGELITO STOCKTON   : 1925  Sex: Male        Patient PCP Information     Provider PCP Type    Gerardo Adhikari MD General         History & Physicals      H&P by Bre Graves MD at 5/3/2018  2:09 PM     Author:  Bre Graves MD Service:  Physical Medicine and Rehabilitation Author Type:  Physician    Filed:  5/3/2018  4:12 PM Date of Service:  5/3/2018  2:09 PM Creation Time:  5/3/2018  2:07 PM    Status:  Signed :  Bre Graves MD (Physician)              [facility]                HISTORY AND PHYSICAL NOTE         Patient Name: Angelito Stockton   YOB: 1925  MRN: [id number]     Age / Sex: 89 year old male    Admit Date: 18    Reason for Admission: intense rehabilitation for[FI1.1] stroke[FI1.2]    History of Present Illness  Angelito Stockton is a 89 year old male[FI1.1] with past medical history significant for chronic atrial fibrillation not on anticoagulation, diabetes that is diet-controlled[FI1.2] who is being admitted to the ARU on 18[FI1.1]    He initially presented to [facility] on 2018 with slurred speech, left facial droop and left-sided weakness.  He was admitted, initial CT scan showed stroke involving the right frontal operculum as well as lacunar infarcts within the left basal ganglia of unclear chronicity.  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  He was seen by neurology but felt not to be a candidate for thrombectomy.    Patient has a history  of refusing medications in the past, having had refused anticoagulation for the atrial fibrillation and medications for the diabetes  His hemoglobin A1c is noted to be 7.0 with diet control.    JORGE was done and the bubble study was negative  After much discussion with the patient and his 2 daughters it was decided to put him on full dose aspirin for 2 weeks after which he is to be on Eliquis.  This decision was made due to the concern of possible falls while on anticoagulation    He does have history of right rotator cuff syndrome which is chronic, however with the overuse given the new left-sided weakness he has had increased pain in his right shoulder.  He is currently on scheduled Tylenol with good relief.[FI1.2]    DD1 with nectar thickened liquids       Functionally, the patient[FI1.1] was independent with all aspects of mobility and ADLs.  He ambulates in the park daily for exercise[FI1.2].  He reports that he cooks his own meals[FI1.3]      Currently, the patient is medically appropriate and is assessed to have needs and will benefit from an inpatient acute rehabilitation comprehensive program working with Physical and Occupational Therapist and will benefit from supervision and management of Rehab Nursing and Rehab MD.     Allergies  No Known Allergies    Past Medical and Surgical History  Past Medical History:   Diagnosis Date     Chronic atrial fibrillation (H)      [place] (hard of hearing)      Prediabetes      Past Surgical History:   Procedure Laterality Date     APPENDECTOMY       CARDIAC SURGERY      pacemaker     NASAL/SINUS POLYPECTOMY           Social History  The patient lives[FI1.1] with his 2 daughters[FI1.2] in [place][FI1.3].  They live in a split-level home with 1 step to enter and 7 steps to the bedroom with rail.[FI1.2]    The patient was previously independent with ambulation without use of cane or walker, previously independent with upper and lower body self care, ADLs, and IADLs.[FI1.1]      He reports that his daughter doing works for the [employer][FI1.3]    Review of Systems  10 point ROS neg other than the symptoms noted above in the HPI and below:  Review Of Systems[FI1.1]  Reports left-sided weakness  Right shoulder pain secondary to rotator cuff syndrome  Reports weakness of his face on the left  Feels unsteady when walking  He is on dysphagia diet DD 1 with nectar thickened liquids  Last bowel movement was today  He has had incontinence and has required straight cath this admission  Reports fatigue[FI1.2]  Reports significant hearing loss bilaterally   r[FI1.3]emainder of the review of the systems was negative.      Physical Examination[FI1.2]  Temp: 96.3  F (35.7  C) Temp src: Oral BP: 122/64 Pulse: 78   Resp: 16 SpO2: 97 % O2 Device: None (Room air)[FI1.4]    Angelito is lying in bed, is comfortable in no acute distress  He is quite hard of hearing  He is slow in processing, however he does comprehend well  He has generalized atrophy  He appears to have significant tolerance to pain and other symptoms and tends not to linger on any deficits  He is oriented to the year and month[FI1.3]  General Awake, alert, not in distress  HEENT EOMs intact  Cardiac Regular  Respiratory Clear breath sounds  Abdomen Soft, nontender      Neurologic   Alert and oriented x 3[FI1.2]  Speech is clear  Is hard of hearing  He has generalized atrophy  Muscle strength examination in the uppers is 3 out of 5 proximally and 5 out of 5 distally  He has decreased coordination in his right hand  He is right-handed  Examination of the lower extremities is consistent with 3 out of 5 proximally and 5 out of 5 distally[FI1.3]      Labs  Lab Results   Component Value Date    WBC 7.7 04/30/2018         Lab Results   Component Value Date    RBC 4.68 04/30/2018     Lab Results   Component Value Date    HGB 14.4 04/30/2018     Lab Results   Component Value Date    HCT 41.6 04/30/2018     No components found for:  MCT  Lab Results   Component Value Date    MCV 89 04/30/2018     Lab Results   Component Value Date    MCH 30.8 04/30/2018     Lab Results   Component Value Date    MCHC 34.6 04/30/2018     Lab Results   Component Value Date    RDW 14.1 04/30/2018     Lab Results   Component Value Date     04/30/2018       Lab Results   Component Value Date     04/30/2018      Lab Results   Component Value Date    POTASSIUM 4.0 04/30/2018     Lab Results   Component Value Date    CHLORIDE 109 04/30/2018     Lab Results   Component Value Date    EDMUNDO 8.2 04/30/2018     Lab Results   Component Value Date    CO2 26 04/30/2018     Lab Results   Component Value Date    BUN 17 04/30/2018     Lab Results   Component Value Date    CR 1.05 04/30/2018     Lab Results   Component Value Date     04/30/2018     No components found for: MRI      Medications[FI1.2]  No current facility-administered medications for this encounter.      Current Outpatient Prescriptions   Medication Sig     acetaminophen (TYLENOL) 325 MG tablet Take 2 tablets (650 mg) by mouth 3 times daily     [START ON 5/4/2018] aspirin 325 MG EC tablet Take 1 tablet (325 mg) by mouth daily     atorvastatin (LIPITOR) 20 MG tablet Take 1 tablet (20 mg) by mouth daily     bisacodyl (DULCOLAX) 10 MG Suppository Place 1 suppository (10 mg) rectally daily as needed for constipation     metoprolol tartrate (LOPRESSOR) 25 MG tablet Take 1 tablet (25 mg) by mouth 2 times daily     Facility-Administered Medications Ordered in Other Encounters   Medication     acetaminophen (TYLENOL) tablet 650 mg     aspirin EC tablet 325 mg    Or     aspirin Suppository 300 mg     bisacodyl (DULCOLAX) Suppository 10 mg     hydrALAZINE (APRESOLINE) injection 10-20 mg     labetalol (NORMODYNE/TRANDATE) injection 10-40 mg     lidocaine (LMX4) cream     lidocaine 1 % 1 mL     magnesium sulfate 4 g in 100 mL sterile water (premade)     Medication Instruction     metoprolol (LOPRESSOR)  injection 5 mg     metoprolol tartrate (LOPRESSOR) tablet 25 mg     naloxone (NARCAN) injection 0.1-0.4 mg     ondansetron (ZOFRAN-ODT) ODT tab 4 mg    Or     ondansetron (ZOFRAN) injection 4 mg     polyethylene glycol (MIRALAX/GLYCOLAX) Packet 17 g     potassium chloride (KLOR-CON) Packet 20-40 mEq     potassium chloride 10 mEq in 100 mL intermittent infusion with 10 mg lidocaine     potassium chloride 10 mEq in 100 mL sterile water intermittent infusion (premix)     potassium chloride 20 mEq in 50 mL intermittent infusion     potassium chloride SA (K-DUR/KLOR-CON M) CR tablet 20-40 mEq     prochlorperazine (COMPAZINE) injection 5 mg    Or     prochlorperazine (COMPAZINE) tablet 5 mg    Or     prochlorperazine (COMPAZINE) Suppository 12.5 mg     senna-docusate (SENOKOT-S;PERICOLACE) 8.6-50 MG per tablet 1 tablet    Or     senna-docusate (SENOKOT-S;PERICOLACE) 8.6-50 MG per tablet 2 tablet     sodium chloride (OCEAN) 0.65 % nasal spray 1-2 spray     sodium chloride (PF) 0.9% PF flush 3 mL     sodium chloride (PF) 0.9% PF flush 3 mL     sodium chloride 0.9% infusion         ASSESSMENT / MANAGEMENT AND INITIATION OF PLAN OF CARE:      POST-ADMISSION EVALUATION:  I have evaluated the patient on admission to the Acute Rehab Center and compared with the preadmission screen, no significant differences are identified and remains appropriate for acute rehabilitation. See below for more details and specifics regarding this admission that supports requiring medical and therapy intensity in the acute rehab setting.      Patient will work with PT for 60 min daily to work on gait exercises, strengthening, endurance buildup, transfers with use of walker as needed.   Patient will work with OT for 60 min daily to work on upper and lower body self care, dressing, toileting, bathing, energy conservation techniques with use of ADs as needed.   Patient will work with SLP for 60 min daily for cognitive evaluation and treatment  "strategies for higher level cognitive deficits and memory impairment.   Rehab RN to administer medication, patient education on medication taking, VS monitoring, and surgical wound dressing changes and monitoring.     Medical Management:  Acute right MCA embolic stroke[FI1.1], likely embolic given the history of chronic atrial fibrillation;  He is currently on full dose aspirin for 2 weeks, with recommendations to start on Eliquis.  He has declined anticoagulation for chronic atrial fibrillation in the past.[FI1.2]  He continues to have mildly slurred speech, facial droop, and left-sided weakness, but overall this has improved compared to initial onset.[FI1.1]   We will continue a[FI1.2]spirin 325 PO/325 NC daily for now[FI1.1]    Permissive hypertension; review of his blood pressures is consistent with high systolic blood pressures of[FI1.2] 1[FI1.5]92 and high diastolic blood pressures up to 10[FI1.2]3.  Per my discussion with Dr. Pratt, metoprolol was added prior to discharge.  Closely monitor the blood pressures especially during the therapy sessions and titrate the beta blockers as needed.[FI1.5]    Chronic atrial fibrillation s/p PPM  [PTA: metoprolol 25 mg BID] As noted above, he has declined anticoagulation in the past stating that he was offered \"rat poison.\"  -back on metformin.      History of diabetes[FI1.1];[FI1.5]  HgbA1c is 7.0[FI1.1] this admit[FI1.5].  Patient has discussed this with his PCP regarding diet controlled and has elected to not[FI1.1] add medications[FI1.5] at this age.[FI1.1]    Dysphagia[FI1.5]  His diet has been started as dysphagia level 1,[FI1.1] nectar thickened liquids, he is[FI1.5] tolerating it well    Bladder, Bowel, GI and DVT ppx[FI1.1]   Bladder ;check PVRs ×3 straight cath for volumes more than 350 cc.  Monitor for symptoms of urinary tract infection, rehab nursing to send for a UA as needed.  He has had urinary retention while in [facility] requiring straight " catheterization    Bowels; place the patient on a bowel program and titrate medications as needed.  Hold the bowel program in case of loose stools.  Educated patient on Impact with fiber and fluid intake on a bowel function    DVT prophylaxis with mechanical means[FI1.5]  Code Status DNR DNI    Prognosis for medical improvement and stabilization of the medical issues for discharge to home is good.     Estimated Length of Stay: 1-2 weeks       Post Admission Physician Evaluation:     I have compared Angelito Castellanos's condition on admission to acute rehabilitation to that outlined in the preadmission screen. History and physical exam performed by me.    No significant differences are identified and the patient remains appropriate for an inpatient rehabilitation facility level of care to manage medical issues and address functional impairments due to[FI1.1] stroke[FI1.5]     Comorbid medical conditions being managed:[FI1.1]   Chronic atrial fibrillation, right MCA stroke, hyperlipidemia, dysphagia.[FI1.5]      Prior functional level: Previously independent with mobility and ADLs, although had become less active over the last several months due to progression of illness.      Present function:[FI1.1] He is presently requiring min assist to contact-guard assist of 1 for ambulating with a walker easily fatigable.  Limited by left-sided weakness impaired balance and safety awareness  He is requiring min to mod assist of 2 for lower body dressing he is on dysphagia diet 1 with nectar thickened liquids[FI1.5]     Anticipated rehabilitation course: Anticipate he  will require[FI1.1] 1-2 weeks[FI1.5]. Anticipate he will be discharged home with his family for support      Will benefit from intensive rehabilitation includin minutes each of PT, OT and SLP - seven days a week. Anticipate he will be able to tolerate the intensity of the therapies.        Rehabilitation nursing; has rehab nursing needs in the reenforcement of  the strategies, taught by the therapists, and bowel and bladder management   Close management by physiatry.      Prognosis:  fair    Estimated length of stay:[FI1.1] 1-2 weeks[FI1.5]        Bre Graves MD, A   Department of Physical Medicine and Rehabilitation  [facility]           Total time spent:[FI1.1] 70 m[FI1.3]inutes with more than half the time was spent counseling and / or coordination of care   Includes counseling / education / discussion     Bre Graves MD, [facility][FI1.1]      Revision History        User Key Date/Time User Provider Type Action    > FI1.4 5/3/2018  4:12 PM Bre Graves MD Physician Sign     FI1.3 5/3/2018  4:07 PM Bre Graves MD Physician      FI1.5 5/3/2018  2:56 PM Bre Graves MD Physician      FI1.2 5/3/2018  2:35 PM Bre Graves MD Physician      FI1.1 5/3/2018  2:07 PM Bre Graves MD Physician                      Discharge Summaries      Discharge Summaries by Naima Moya MD at 5/17/2018  5:08 PM     Author:  Naima Moya MD Service:  Acute IP Rehab Author Type:  Physician    Filed:  5/22/2018 12:20 PM Date of Service:  5/17/2018  5:08 PM Creation Time:  5/17/2018  5:06 PM    Status:  Addendum :  Naima Moya MD (Physician)             [facility]   Acute Rehabilitation Unit  Discharge summary     Date of Admission: 5/3/2018  Date of Discharge:[LB1.1] 05/22/18[PS1.1]   Disposition:[LB1.1] TCU[PS1.1]  Primary Care Physician: Gerardo Adhikari  Attending physician: Naima Moya MD      discharge diagnosis[LB1.1]    Acute ischemic stroke with large vessel occlusion  Chronic A-fib  HTN  Urinary Retention[PS1.1]      brief summary  Mr. Angelito Castellanos is a  89 year old man with a history of chronic a-fib s/p PPM, BPH, Reflux, and HTN who presented to [facility] 4/29  with slurred speech, left facial droop, and left sided weakness admitted to  ARU  5/3 for ongoing rehabilitation and medical management.     rehabilitation course[LB1.1]  Swallowing/Nutrition: Completed VFSS 5/17. Based on eval:pt's swallow functin has improved- now demosntrating a mild- moderate oral pharygneal dysphagia characterized by the following: premature pharygneal entry to the level of the pyriform sinus' with thin liquid trials by teaspoon and by cup rim ( and also a mild delayed swallow initiation) there was no aspiration or penetration with thin liquid trials. Attempted to trial thin by straw too but pt was not able. Pt does have some mild pooling of thin liquids in the valleculae and pyriform sinus following the swallow but with additional swallows this clears to trace amounts. With semi-solid consistency- pt is noted to have prologned mastication and oral prep with the semi- solids and some difficulty with A-P propulsion of bolus- noting some lingual pumping.  Recommending that pt continue with DD2 solids but that pt's liquids be advanced to thin liquids. Pt needs to continue to use safe swallow strategies of upright for all po, small bites/sips, alternate solids and liquids, multiple swallows with both liquids and solids, pace self- eat slowly- 1 bite at a time.  will need continued sptx following d/c to TCU as pt's diet remains below baseline     Bowel/Bladder: Incontinent of bowel and bladder, continues working on times toileting during the day, may wear condom catheter at night.     Psychosocial: Pt is a 89-year-old male who lives in a house with his daughter, Shae, in [place], [place]. He has another adult daughter, Ethan, who lives locally, as well. Both are very supportive and involved, as are his adult grandchildren. Pt/family ultimately would like for pt to return home, but understand that a TCU stay would be beneficial to continue with therapies. .       ADLs/IADLs: Pt's progress is slow d/t cognitive deficits and poor safety awareness. Pt requires SBA with G/H tasks  standing at EOS with FWW. Pt requires CGA with gathering clothing from closet with FWW with mod v/c's for safety with use of FWW. Pt requires SBA with UBD seated with verbal cues to initiate and min A with managing buttons. Pt requires min A with donning/doffing pants d/t difficulty threading LLE. Pt requires up to min A with donning/doffing socks and shoes seated. Pt is incontinent of BM and bladder. Pt requires CGA with toilet transfer with FWW with verbal and tactile cues for safe approach, and up to max A with toilet hygiene d/t incontinence. Pt scored 14/30 on the MOCA indicating cognitive deficits. Recommended 24/7 supervision upon D/C; pt going to D/C to TCU with ongoing OT services.      Mobility:  Pt with slow progress with therapies due to cognitive deficits. Patient needs cues for safety with most mobility tasks, remains impulsive with mobility. Has demonstrated increased indep with bed mobility and transfers needing SBA to mod I depending on fatigue. Ambulates with CGA - Rosario and FWW; occasionally needs assistance advancing L LE during gait. Patient would benefit from 24 hour supervision at discharge d/t cognition and safety concerns, at risk for falls, limited carryover for learning during therapy sessions. DC to TCU vs prison with increased services. Patient would benefit from continued skilled PT for progression of balance, improved indep with functional mobility in a TCU setting.      Cognition/Language: Pt has mild- moderate deficits in short term memory, flexible attention and with problem solving/reasoning for more complex tasks. Pt recently has made some progress with recall of directions and during tx tasks - visual memory task 2 days ago at 100%-- but memory is variable pending task/situation and is not always consistent. With problem solving reasoning- more basic tasks- able to complete with standby assist but with more complex tasks- needing at least moderate amount of assist. Pt would need 24  hours supervision at d/c due to safety concerns with cognition. Recommend d/c to TCU for ongoing tx to address cognition and swallowing.[LB1.2]       mEDICAL COURSE[LB1.1]  Acute ischemic stroke with large vessel occlusion- presented with left hemiparesis, dysarthria, dysphagia.  CT Head 4/30: Evolving ischemic infarcts in the right and left middle cerebral artery territories.Diffuse cerebral volume loss and cerebral white matter changes consistent with chronic small vessel ischemic disease     -continue DD1 with nectar. 05/08/18 now on DD2 with nectar thick. FVSS 5/17; diet was advanced to thin liquids.  On asa initially and now on apixiban since 5/14  -continue lipitor 20 mg daily (started by neurology)  -continue PT/OT/SLP  -[facility] up [facility] ([facility])  - HTN, A-Fib management as below     Chronic A-fib- received asa through 5/13 started apixiban 5/14 per neurology recs.  PPM in place.   -continue metoprolol 25 mg bid  -continue apixiban.     HTN- some variability overall stable. SBP now on 100-120s  -will continue metoprolol 12.5 bid       Urinary Retention- 5/4 UA with + hematuria, + LE, suspected UTI received 3 days cipro with UCx neg for pathogen. Both patient and his daughter reported urinary obstructive symptom prior to admission including urge incontinence, dribbling, straining and weak stream    -continue flomax & finasteride  - bliss removed 5/14, intermittent small volume uop and urinary incontinence- no ST cath has been needed over the past few days   - continue timed toileting and double voiding (elevated PVRs)  - follow up with urology[PS1.1]       dISCHARGE MEDICATIONS[LB1.1]  Current Discharge Medication List      START taking these medications    Details   apixaban ANTICOAGULANT (ELIQUIS) 5 MG tablet Take 1 tablet (5 mg) by mouth 2 times daily    Associated Diagnoses: Atrial fibrillation, unspecified type (H)      finasteride (PROSCAR) 5 MG tablet Take 1 tablet (5 mg) by mouth daily  Qty: 30  tablet    Associated Diagnoses: Urinary retention with incomplete bladder emptying      psyllium (METAMUCIL/KONSYL) Packet Take 1 packet by mouth daily    Associated Diagnoses: Constipation, unspecified constipation type      senna-docusate (SENOKOT-S;PERICOLACE) 8.6-50 MG per tablet Take 1-2 tablets by mouth 2 times daily as needed for constipation  Qty: 100 tablet    Associated Diagnoses: Constipation, unspecified constipation type      tamsulosin (FLOMAX) 0.4 MG capsule Take 1 capsule (0.4 mg) by mouth every evening  Qty: 60 capsule    Associated Diagnoses: Urinary retention with incomplete bladder emptying; Acute embolic stroke (H)         CONTINUE these medications which have CHANGED    Details   acetaminophen (TYLENOL) 325 MG tablet Take 2 tablets (650 mg) by mouth every 6 hours as needed for mild pain  Qty: 100 tablet    Associated Diagnoses: 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)      metoprolol tartrate (LOPRESSOR) 25 MG tablet Take 0.5 tablets (12.5 mg) by mouth 2 times daily  Qty: 60 tablet    Associated Diagnoses: Atrial fibrillation, unspecified type (H)         CONTINUE these medications which have NOT CHANGED    Details   atorvastatin (LIPITOR) 20 MG tablet Take 1 tablet (20 mg) by mouth daily  Qty: 90 tablet, Refills: 1    Associated Diagnoses: 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)      bisacodyl (DULCOLAX) 10 MG Suppository Place 1 suppository (10 mg) rectally daily as needed for constipation  Qty: 30 suppository    Associated Diagnoses: Slow transit constipation         STOP taking these medications       aspirin 325 MG EC tablet Comments:   Reason for Stopping:[PS1.2]                 DISCHARGE INSTRUCTIONS AND FOLLOW UP[LB1.1]    Discharge Procedure Orders  UROLOGY ADULT REFERRAL   Referral Type: Consultation     General info for SNF   Order Comments: Length of Stay Estimate: Short Term Care: Estimated # of Days <30  Condition at  Discharge: Improving  Level of care:skilled   Rehabilitation Potential: Good  Admission H&P remains valid and up-to-date: Yes  Recent Chemotherapy: N/A  Use Nursing Home Standing Orders: Yes     Mantoux instructions   Order Comments: Give two-step Mantoux (PPD) Per Facility Policy Yes     Reason for your hospital stay   Order Comments: Admitted for rehabilitation following hospitalization for stroke.     Bladder scan   Order Comments: X 2 for post void residual     Activity - Up with nursing assistance   Order Specific Question Answer Comments   Is discharge order? Yes      Follow Up ([facility])   Order Comments: Follow up with primary care provider, Gerardo Adhikari/ ZAHIDA jay, within 7 days follow up hospitalization    Follow up with urology- follow up urinary retention    Follow up with neurology- f/u stroke.     Appointments on [place] and/or [facility] (with [facility] or [facility] provider or service). Call [phone] if you haven't heard regarding these appointments within 7 days of discharge.     DNR/DNI     Occupational Therapy Adult Consult   Order Comments: Evaluate and treat as clinically indicated.    Reason:  stroke     Speech Language Path Adult Consult   Order Comments: Evaluate and treat as clinically indicated.    Reason:  Stroke- impaired cognition, swallow     Physical Therapy Adult Consult   Order Comments: Evaluate and treat as clinically indicated.    Reason:  stroke     Advance Diet as Tolerated   Order Comments: Follow this diet upon discharge: Orders Placed This Encounter     Room Service     Combination Diet Dysphagia Diet Level 2: Mechan Altered; Thin Liquids (water, ice chips, juice, milk gelatin, ice cream, etc)   Order Specific Question Answer Comments   Is discharge order? Yes[PS1.2]           Discharge summary was forwarded to Gerardo Adhikari (PCP) at the time of discharge, so as to bridge from hospital to outpatient care.     It was our pleasure to care for Angelito Castellanos during this  hospitalization. Please do not hesitate to contact me should there be questions regarding the hospital course or discharge plan.[LB1.1]        Naima Moya MD  Physical Medicine & Rehabilitation[PS1.1]         Revision History        User Key Date/Time User Provider Type Action    > [N/A] 5/22/2018 12:20 PM Naima Moya MD Physician Addend     PS1.2 5/22/2018 12:20 PM Naima Moya MD Physician Sign     PS1.1 5/22/2018 12:15 PM Naima Moya MD Physician      LB1.2 5/17/2018  5:15 PM Lottie Alarcon PA Physician Assistant Share     LB1.1 5/17/2018  5:06 PM Lottie Alarcon PA Physician Assistant                   Consult Notes     No notes of this type exist for this encounter.         Progress Notes - Physician (Notes from 05/19/18 through 05/22/18)      Progress Notes by Idania Hernandez MD at 5/21/2018  9:07 AM     Author:  Idania Hernandez MD Service:  Physical Medicine and Rehabilitation Author Type:  Physician    Filed:  5/22/2018  5:12 AM Date of Service:  5/21/2018  9:07 AM Creation Time:  5/22/2018  5:07 AM    Status:  Signed :  Idania Hernandez MD (Physician)         [facility]   Physical Medicine and Rehabilitation Daily Note           Assessment and Plan of Care:   Mr. Castellanos is a 89 year old male with a history of afib s/p PPM, BPH, and HTN who was admitted to ARU on 5/3 following a right MCA stroke.     --Vitals stable. No labs today.  --Continue ongoing medical management.  --Continue therapies and plan of care.             Interval history:   Patient seen and examined at bedside. He is hard of hearing. Tolerating DD2 diet with thin liquids. He appears to be on track for discharge tomorrow. Denies fever, chills, CP, SOB, N/V, abdominal pain, new pain or weakness/numbness/tingling.             Physical Exam:   VS:[SS1.1]   Vitals:    05/21/18 0600 05/21/18 0809 05/21/18 1549 05/21/18 2022   BP: (!) 143/91 113/73  102/66 121/80   BP Location: Right arm  Right arm Right arm   Pulse: 79 72 85    Resp: 16  16    Temp: 95.9  F (35.5  C)  96  F (35.6  C)    TempSrc: Oral  Oral    SpO2: 95%  97%    Weight:       Height:[SS1.2]         Gen: NAD, resting comfortably in bedside chair  HEENT: [place], moist mucus membranes  Lungs: breathing unlabored on room air  Ext: no edema in BLE  MSK/neuro: Alert and oriented, speech fluent, moves all four extremities volitionally.          Data:   Scheduled meds[SS1.1]    apixaban ANTICOAGULANT  5 mg Oral BID     atorvastatin  20 mg Oral Daily     barium sulfate 40%   Oral Once     diclofenac   Transdermal Q8H    And     diclofenac   Transdermal BID     finasteride  5 mg Oral Daily     metoprolol tartrate  12.5 mg Oral BID     psyllium  1 packet Oral Daily     tamsulosin  0.4 mg Oral QPM[SS1.2]       PRN meds:[SS1.1]  acetaminophen, bisacodyl, diclofenac **AND** diclofenac **AND** diclofenac, lidocaine 2 %, polyethylene glycol, senna-docusate[SS1.2]      Idania Hernandez  Physical Medicine and Rehabilitation     I spent a total of 15 minutes face-to-face and managing the care of Angelito Castellanos. Over 50% of my time on the unit was spent counseling the patient and coordinating care. Please see note for details.[SS1.1]        Revision History        User Key Date/Time User Provider Type Action    > SS1.2 5/22/2018  5:12 AM Idania Heranndez MD Physician Sign     SS1.1 5/22/2018  5:07 AM Idania Hernandez MD Physician             Progress Notes by Aster Marquez RN at 5/21/2018 12:11 AM     Author:  Aster Marquez RN Service:  Acute IP Rehab Author Type:  Registered Nurse    Filed:  5/21/2018  1:08 AM Date of Service:  5/21/2018 12:11 AM Creation Time:  5/21/2018 12:11 AM    Status:  Addendum :  Aster Marquez RN (Registered Nurse)         FOCUS/GOAL  Bladder management, Nutrition/Feeding/Swallowing precautions, Pain management, Mobility and Safety  "management    ASSESSMENT, INTERVENTIONS AND CONTINUING PLAN FOR GOAL:  Cognitive -- alert and answers questions appropriately , able to follow instructions and not impulsive , noted that he is not using the call light staff anticipate his needs[IU1.1] [place] able to understand with clear modulated tone of voice used pocket talker occasionally[IU1.2] .  Nutrition feeding / swallowing precautions --  Pt was up on the chair for supper , ate 100 % of his dinner  After set up need encouragement to drink more fluids , tolerated his dinner well  But at HS when pt was on bed with HOB up on almost 90 % staff noted some coughing episodes when he took his medications with applesauce and thin liquids  Staff gave him nectar thickened liquids with his second scoop of applesauce with the medications , no coughing noted pt said it went down well pt called the nectar thickened liquid as \" heavy water \" RN passed it on the report to let  The Speech therapist know about it in AM .  Transfer,  toilet transfer -- ambulated to the toilet with CGA using walker and transferred to the toilet with steadying assist ,needs help with both legs when getting back to bed and assist of 2 to boost up and 1 to turn and repositioned   Toileting -- assist of one with pulling up and down pants and pull up , pericare and applying clean brief   Undressing , dressing , grooming , skin care -- total assist with removing Tshirt and pants to change to a hospital gown , pt was able to wash face after the wash cloth was handed to him brush teeth with set up and cueing , he was also given a sponge bath because he declined to have a shower , total assist with sponge bath .  Skin integrity -- noted yesterday a cut on the back area of the penis   Wound is almost closed when staff check during pericare , wound care was done , and condomn cath was applied , voided 75 ml clear fco urine an hour after the condom cath was applied PVR was 217.  Bed alarm on  Pt was " instructed to use the call light if he needs assistance with anything[IU1.1]      Revision History        User Key Date/Time User Provider Type Action    > IU1.2 5/21/2018  1:08 AM Astre Marquez RN Registered Nurse Addend     IU1.1 5/21/2018 12:40 AM Aster Marquez RN Registered Nurse Sign            Progress Notes by Danyelle Ledbetter MD at 5/20/2018 11:08 AM     Author:  Danyelle Ledbetter MD Service:  Physical Medicine and Rehabilitation Author Type:  Resident    Filed:  5/20/2018 11:09 AM Date of Service:  5/20/2018 11:08 AM Creation Time:  5/20/2018 11:08 AM    Status:  Attested :  Danyelle Ledbetter MD (Resident)    Cosigner:  Derek Greer DO at 5/20/2018 11:23 AM        Attestation signed by Derek Greer DO at 5/20/2018 11:23 AM        Attestation:  Physician Attestation   IDerek, personally examined and evaluated this patient.  I discussed the patient with the medical student and/or resident and care team, and agree with the assessment and plan of care as documented in the note of 5/20/2018 [date].      I personally reviewed medications and labs.    Key findings: Patient seen at bedside this morning.  Notes incontinence of bladder.  Denies a bowel movement recently.  States slept okay last night.  Denies any pain presently.  Given Psyllium this morning and consider initiation of MiraLAX for bowel movement.  Continue with other current medical management/therapies/plan of care as documented.  Derek Greer DO  Date of Service (when I saw the patient): 05/20/18    I spent a total of 15 minutes bedside and on the inpatient unit today managing the care of the above patient.  Over 50% of my time on the unit was spent counseling the patient and/or coordinating care.  See note for details.                               [facility]   Physical Medicine and Rehabilitation Daily Note         Assessment and Plan of Care:    Mr. Angelito Castellanos is a very pleasant 89 year old man with a history of chronic a-fib s/p PPM, BPH, Reflux, and HTN who presented to [facility] 4/29  with slurred speech, left facial droop, and left sided weakness admitted to  ARU 5/3 for ongoing rehabilitation and medical management.     --Vitals stable. No lab today.  --Continue ongoing medical management.  --Continue therapies and plan of care.           Interval history:     The patient was seen and examined at the bedside. Nursing notes reviewed. Slept well. Participating in therapies. No overnight events. Denies fever, chills, CP, SOB, N/V, abdominal pain, new pain or weakness/numbness/tingling.          Physical Exam:     Vitals:    05/19/18 1045 05/19/18 2049 05/20/18 0616 05/20/18 0846   BP: 107/67 103/72 116/65 110/57   BP Location: Right arm Right arm Right arm Right arm   Pulse:  72 75 100   Resp:  16 16 16   Temp:  97.1  F (36.2  C) 96.4  F (35.8  C) 96.1  F (35.6  C)   TempSrc:  Oral Oral Oral   SpO2:   94% 92%   Weight:       Height:         Gen: NAD, sitting up out of bed  HEENT: [place]  Lungs: clear breath sounds b/l, no increased work of breathing  Abd: soft and non-tender  Ext: wwp, no edema in BLE, no tenderness in calves  MSK/neuro: Alert. Speech fluent. Moves BUE and BLE volitionally. Sensation intact to bilateral UE and LE to light touch.   Skin: No open areas or wounds         Data:   Scheduled meds    apixaban ANTICOAGULANT  5 mg Oral BID     atorvastatin  20 mg Oral Daily     barium sulfate 40%   Oral Once     diclofenac   Transdermal Q8H    And     diclofenac   Transdermal BID     finasteride  5 mg Oral Daily     metoprolol tartrate  12.5 mg Oral BID     psyllium  1 packet Oral Daily     tamsulosin  0.4 mg Oral QPM       PRN meds:  acetaminophen, bisacodyl, diclofenac **AND** diclofenac **AND** diclofenac, lidocaine 2 %, polyethylene glycol, senna-docusate    Danyelle Ledbetter, DO  Physical Medicine and Rehabilitation, PGY-4     The  patient was discussed with staff, Dr. Greer.[GM1.1]       Revision History        User Key Date/Time User Provider Type Action    > GM1.1 5/20/2018 11:09 AM Danyelle Ledbetter MD Resident Sign            Progress Notes by Danyelle Ledbetter MD at 5/19/2018  9:08 AM     Author:  Danyelle Ledbetter MD Service:  Physical Medicine and Rehabilitation Author Type:  Resident    Filed:  5/19/2018  9:19 AM Date of Service:  5/19/2018  9:08 AM Creation Time:  5/19/2018  9:08 AM    Status:  Attested :  Danyelle Ledbetter MD (Resident)    Cosigner:  Derek Greer DO at 5/19/2018 12:31 PM        Attestation signed by Derek Greer DO at 5/19/2018 12:31 PM        Attestation:  Physician Attestation   Derek PATINO, personally examined and evaluated this patient.  I discussed the patient with the medical student and/or resident and care team, and agree with the assessment and plan of care as documented in the note of 5/19/2018 [date].      Key findings: Patient seen at bedside.  Notes good sleep.  Denies any pain presently.   Notes problems with emptying his bladder.  Continue with bladder program along with ongoing medical management/therapies/plan of care.  Derek Greer DO  Date of Service (when I saw the patient): 05/19/18    I spent a total of 15 minutes bedside and on the inpatient unit today managing the care of the above patient.  Over 50% of my time on the unit was spent counseling the patient and/or coordinating care.  See note for details.                               [facility]   Physical Medicine and Rehabilitation Daily Note         Assessment and Plan of Care:   Mr. Angelito Castellanos is a very pleasant 89 year old man with a history of chronic a-fib s/p PPM, BPH, Reflux, and HTN who presented to [facility] 4/29  with slurred speech, left facial droop, and left sided weakness admitted to  ARU 5/3 for ongoing rehabilitation and medical  management.     --Vitals stable. No lab today.  --Continue ongoing medical management.  --Continue therapies and plan of care.           Interval history:     The patient was seen and examined at the bedside. Nursing notes reviewed. Last BM 5/18. Urinary retention with slightly elevated PVRs and requiring intermittent cathing. Participating in therapies. No overnight events. Denies fever, chills, CP, SOB, N/V, abdominal pain, new pain or weakness/numbness/tingling.          Physical Exam:[GM1.1]     Vitals:    05/18/18 2040 05/19/18 0557 05/19/18 0753 05/19/18 0900   BP: 124/53 110/61 (!) 125/102 101/63   BP Location:  Right arm Right arm Right arm   Pulse:  80 103 72   Resp:  16 16    Temp:  96.5  F (35.8  C)     TempSrc:  Oral     SpO2:  93% 97%    Weight:       Height:[GM1.2]         Gen: NAD, sitting up out of bed  HEENT: [place]  Lungs: clear breath sounds b/l, no increased work of breathing  Abd: soft and non-tender  Ext: wwp, no edema in BLE, no tenderness in calves  MSK/neuro: Alert. Speech fluent. Moves BUE and BLE volitionally.   Skin: No open areas or wounds         Data:   Scheduled meds    apixaban ANTICOAGULANT  5 mg Oral BID     atorvastatin  20 mg Oral Daily     barium sulfate 40%   Oral Once     diclofenac   Transdermal Q8H    And     diclofenac   Transdermal BID     finasteride  5 mg Oral Daily     metoprolol tartrate  12.5 mg Oral BID     psyllium  1 packet Oral Daily     tamsulosin  0.4 mg Oral QPM       PRN meds:  acetaminophen, bisacodyl, diclofenac **AND** diclofenac **AND** diclofenac, lidocaine 2 %, polyethylene glycol, senna-docusate    Danyelle Ledbetter, DO  Physical Medicine and Rehabilitation, PGY-4     The patient was discussed with staff, Dr. Greer.[GM1.1]       Revision History        User Key Date/Time User Provider Type Action    > GM1.2 5/19/2018  9:19 AM Danyelle Ledbetter MD Resident Sign     GM1.1 5/19/2018  9:08 AM Danyelle Ledbetter MD Resident             Progress Notes by  "Stephanie Maynard LSW at 5/19/2018 11:55 AM     Author:  Stephanie Maynard LSW Service:  (none) Author Type:      Filed:  5/19/2018 12:09 PM Date of Service:  5/19/2018 11:55 AM Creation Time:  5/19/2018 11:55 AM    Status:  Signed :  Stephanie Maynard LSW ()         D/I: Left voice message for daughter, Ethan.  She returned call saying she got really sick and her sister is out of town, so they're deferring tours to the grandkids this weekend.  Writer reiterated that family needs to give input on choices by Monday morning due to time constraints; daughter receptive and promised to call sw back on Monday morning \"first thing\".  Writer had these updates with TCU referrals:  -[facility]-spoke with Candelaria who will fill beds this weekend, but should have male openings Tues/Wed (check w/Rossana on Monday)  -[facility]-spoke with Felipe who said to check availability with Jennifer on Monday  -[facility]-spoke with Gifty who said wknd beds filled, but opening Monday so nurse will review referral Mon/call back Mon  -[place]-spoke with Chio who said Hnana is considering for stroke unit/call Hanna Mon  A/P:  SW will follow and assist with discharge planning.[LH1.1]           Revision History        User Key Date/Time User Provider Type Action    > LH1.1 5/19/2018 12:09 PM Stephanie Maynard LSW  Sign            Progress Notes by Naima Moya MD at 5/18/2018  5:00 PM     Author:  Naima Moya MD Service:  Physical Medicine and Rehabilitation Author Type:  Physician    Filed:  5/19/2018  1:18 AM Date of Service:  5/18/2018  5:00 PM Creation Time:  5/19/2018  1:12 AM    Status:  Signed :  Naima Moya MD (Physician)           [facility]   Acute Rehabilitation Unit  Daily progress note    interval history  Angelito Castellanos was seen and examined in his room. His grandson Jw was present. Doing well; no pain or " "discomfort. Discussed discharge planning with his grandson and answered questions. Also spoke with SW; waiting for family's decision.     Participating well in therapies; requires min to mod A for safety when using FWW due to impaired problem solving and memory.     medications    apixaban ANTICOAGULANT  5 mg Oral BID     atorvastatin  20 mg Oral Daily     barium sulfate 40%   Oral Once     diclofenac   Transdermal Q8H    And     diclofenac   Transdermal BID     finasteride  5 mg Oral Daily     metoprolol tartrate  12.5 mg Oral BID     psyllium  1 packet Oral Daily     tamsulosin  0.4 mg Oral QPM        acetaminophen, bisacodyl, diclofenac **AND** diclofenac **AND** diclofenac, lidocaine 2 %, polyethylene glycol, senna-docusate     physical exam  /53  Pulse 75  Temp 96.3  F (35.7  C) (Oral)  Resp 16  Ht 1.798 m (5' 10.8\")  Wt 66 kg (145 lb 9.6 oz)  SpO2 100%  BMI 20.42 kg/m2     Gen: NAD, resting in bed    Pulm: non-labored on room air   AB: soft non tender  Ext: no edema in bilateral lower extremities    Neuro/MSK: [place]; mild left hemiparesis 4/5 and RUE/RLE 4+/5. Sensation intact to LT    labs  No new labs     Rehabilitation - continue comprehensive acute inpatient rehabilitation program with multidisciplinary approach including therapies, rehab nursing, and physiatry following. See interval history for updates.      assessment and plan    Mr. Angelito Castellanos is a  89 year old man with a history of chronic a-fib s/p PPM, BPH, Reflux, and HTN who presented to [facility] 4/29  with slurred speech, left facial droop, and left sided weakness admitted to  ARU 5/3 for ongoing rehabilitation and medical management.       Acute ischemic stroke with large vessel occlusion- presented with left hemiparesis, dysarthria, dysphagia.  CT Head 4/30: Evolving ischemic infarcts in the right and left middle cerebral artery territories.Diffuse cerebral volume loss and cerebral white matter changes consistent with chronic " small vessel ischemic disease  -continue DD1 with nectar. 05/08/18 now on DD2 with nectar thick. FVSS 5/17; diet was advanced to thin liquids.  On asa initially and now on apixiban since 5/14  -continue lipitor 20 mg daily (started by neurology)  -continue PT/OT/SLP  -follow up with neurology ([facility])  - HTN, A-Fib management as below    Chronic A-fib- received asa through 5/13 started apixiban 5/14 per neurology recs.  PPM in place.   -continue metoprolol 25 mg bid  -continue apixiban.    HTN- some variability overall stable. SBP now on 100-120s  -will continue metoprolol- if continues to remain in this range consider decrease dose.       Urinary Retention- 5/4 UA with + hematuria, + LE, suspected UTI received 3 days cipro with UCx neg for pathogen. Both patient and his daughter reported urinary obstructive symptom prior to admission including urge incontinence, dribbling, straining and weak stream  -continue flomax & finasteride  - bliss removed 5/14, intermittent small volume uop and urinary incontinence- no ST cath for >24 hours anticipate dc without bliss  -continue timed toileting and double voiding (elevated PVRs)  -follow up with urology     Constipation- resolved   -monitor.   -continue fiber  -prn senokot & miralax as well as supp        Naima Moya MD  Physical Medicine & Rehabilitation[PS1.1]             Revision History        User Key Date/Time User Provider Type Action    > PS1.1 5/19/2018  1:18 AM Naima Moya MD Physician Sign                  Procedure Notes     No notes of this type exist for this encounter.      Progress Notes - Therapies (Notes from 05/19/18 through 05/22/18)     No notes of this type exist for this encounter.

## 2018-05-03 NOTE — PLAN OF CARE
Problem: PT General Care Plan  Goal: PT target date for goal attainment  Discharge Planner PT   Patient plan for discharge: another rehab  Current status: Patient in bed upon arrival, agreeable to therapy after encouragement. Presents with left sided weakness and right shoulder pain. Supine to sit with HOB elevated and Min Assist x 1. Extra time needed to scoot to EOB. Sit <> stand x 2 with walker and Min Assist x 1. Ambulated 5 ft x 1 and 100 ft x 1 with FWW and CGA; mild path deviations and some difficulty maneuvering unmovable environmental obstacles. Patient fatigued to point of extreme shakiness with controlled knee flexion. Was sat in chair for 1 minute rest.  break. Patient in chair at end of session.  Barriers to return to prior living situation: decreased strength, decreased activity tolerance, fall risk, level of assist for functional mobility  Recommendations for discharge: ARU per plan established by Physical Therapist   Rationale for recommendations: Would benefit from continued PT to address balance, strength and increase independence with mobility.         Entered by: Zayda Lozano 05/03/2018 8:51 AM       Pt discharging to ARU today. PT goals not met.

## 2018-05-03 NOTE — IP AVS SNAPSHOT
"    [facility]: [id number]                                              INTERAGENCY TRANSFER FORM - LAB / IMAGING / EKG / EMG RESULTS   5/3/2018                    Hospital Admission Date: 5/3/2018  ALISHA STOCKTON   : 1925  Sex: Male        Attending Provider: Naima Moya MD     Allergies:  No Known Allergies    Infection:  None   Service:  ACUTE IP ROMARIO    Ht:  1.798 m (5' 10.8\")   Wt:  66 kg (145 lb 9.6 oz)   Admission Wt:  71.6 kg (157 lb 12.8 oz)    BMI:  20.42 kg/m 2   BSA:  1.82 m 2            Patient PCP Information     Provider PCP Type    Gerardo Adhikari MD General         Lab Results - 3 Days      Basic metabolic panel [[id number]] (Abnormal)  Resulted: 18 1146, Result status: Final result    Ordering provider: Naima Moya MD  18 0826 Resulting lab: [facility]    Specimen Information    Type Source Collected On   Blood  18 1014          Components       Value Reference Range Flag Lab   Sodium 143 133 - 144 mmol/L  13   Potassium 4.2 3.4 - 5.3 mmol/L  13   Chloride 107 94 - 109 mmol/L  13   Carbon Dioxide 28 20 - 32 mmol/L  13   Anion Gap 8 3 - 14 mmol/L  13   Glucose 213 70 - 99 mg/dL H 13   Urea Nitrogen 25 7 - 30 mg/dL  13   Creatinine 1.07 0.66 - 1.25 mg/dL  13   GFR Estimate 65 >60 mL/min/1.7m2  13   Comment:  Non  GFR Calc   GFR Estimate If Black 78 >60 mL/min/1.7m2  13   Comment:  African American GFR Calc   Calcium 8.9 8.5 - 10.1 mg/dL  13            CBC with platelets [[id number]]  Resulted: 18 1022, Result status: Final result    Ordering provider: Naima Moya MD  18 0826 Resulting lab: [facility]    Specimen Information    Type Source Collected On   Blood  18 1014          Components       Value Reference Range Flag Lab   WBC 5.9 4.0 - 11.0 10e9/L  13   RBC Count 4.60 4.4 - 5.9 10e12/L  13   Hemoglobin 14.1 13.3 - 17.7 g/dL  13   Hematocrit 42.2 40.0 - 53.0 %  " 13   MCV 92 78 - 100 fl  13   MCH 30.7 26.5 - 33.0 pg  13   MCHC 33.4 31.5 - 36.5 g/dL  13   RDW 13.5 10.0 - 15.0 %  13   Platelet Count 202 150 - 450 10e9/L  13            Testing Performed By     Lab - Abbreviation Name Director Address Valid Date Range    13 - Unknown [facility] Unknown [address] [facility] [facility] [address] 01/15/15 0916 - Present            Unresulted Labs     None      Encounter-Level Documents:     There are no encounter-level documents.      Order-Level Documents:     There are no order-level documents.

## 2018-05-03 NOTE — IP AVS SNAPSHOT
"    [facility]: [phone]                                              INTERAGENCY TRANSFER FORM - PHYSICIAN ORDERS   5/3/2018                    Hospital Admission Date: 5/3/2018  ALISHA STOCKTON   : 1925  Sex: Male        Attending Provider: Naima Moya MD     Allergies:  No Known Allergies    Infection:  None   Service:  ACUTE IP ROMARIO    Ht:  1.798 m (5' 10.8\")   Wt:  66 kg (145 lb 9.6 oz)   Admission Wt:  71.6 kg (157 lb 12.8 oz)    BMI:  20.42 kg/m 2   BSA:  1.82 m 2            Patient PCP Information     Provider PCP Type    Gerardo Adhikari MD General      ED Clinical Impression     Diagnosis Description Comment Added By Time Added    Atrial fibrillation, unspecified type (H) [I48.91] Atrial fibrillation, unspecified type (H) [I48.91]  Lottie Alarcon PA 2018  4:49 PM    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) [I63.412] 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) [I63.412]  Lottie Alarcon PA 2018  4:50 PM    Urinary retention with incomplete bladder emptying [R33.9] Urinary retention with incomplete bladder emptying [R33.9]  Lottie Alarcon PA 2018  4:50 PM    Constipation, unspecified constipation type [K59.00] Constipation, unspecified constipation type [K59.00]  Lottie Alarcon PA 2018  4:50 PM    Acute embolic stroke (H) [I63.9] Acute embolic stroke (H) [I63.9]  Naima Moya MD 2018  6:24 AM      Hospital Problems as of 2018              Priority Class Noted POA    Stroke (H) Medium  5/3/2018 Yes      Non-Hospital Problems as of 2018              Priority Class Noted    Acute embolic stroke (H) Medium  2018      Code Status History     Date Active Date Inactive Code Status Order ID Comments User Context    2018  4:53 PM  DNR/DNI [id number]  Lottie Alarcon PA Outpatient    5/3/2018  3:56 PM 2018  4:53 PM DNR/DNI [id number]  Nita, " MD Naima Inpatient    5/3/2018 11:58 AM 5/3/2018  3:56 PM DNR/DNI [id number]  Selene Pratt MD Outpatient    4/29/2018  9:30 PM 5/3/2018 11:58 AM DNR/DNI [id number]  Gabriela Zaidi MD Inpatient         Medication Review      START taking        Dose / Directions Comments    apixaban ANTICOAGULANT 5 MG tablet   Commonly known as:  ELIQUIS   Used for:  Atrial fibrillation, unspecified type (H)        Dose:  5 mg   Take 1 tablet (5 mg) by mouth 2 times daily   Refills:  0        finasteride 5 MG tablet   Commonly known as:  PROSCAR   Used for:  Urinary retention with incomplete bladder emptying        Dose:  5 mg   Take 1 tablet (5 mg) by mouth daily   Quantity:  30 tablet   Refills:  0        psyllium Packet   Commonly known as:  METAMUCIL/KONSYL   Used for:  Constipation, unspecified constipation type        Dose:  1 packet   Take 1 packet by mouth daily   Refills:  0        senna-docusate 8.6-50 MG per tablet   Commonly known as:  SENOKOT-S;PERICOLACE   Used for:  Constipation, unspecified constipation type        Dose:  1-2 tablet   Take 1-2 tablets by mouth 2 times daily as needed for constipation   Quantity:  100 tablet   Refills:  0        tamsulosin 0.4 MG capsule   Commonly known as:  FLOMAX   Used for:  Urinary retention with incomplete bladder emptying, Acute embolic stroke (H)        Dose:  0.4 mg   Take 1 capsule (0.4 mg) by mouth every evening   Quantity:  60 capsule   Refills:  0          CONTINUE these medications which may have CHANGED, or have new prescriptions. If we are uncertain of the size of tablets/capsules you have at home, strength may be listed as something that might have changed.        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)        Dose:  650 mg   Take 2 tablets (650 mg) by mouth  every 6 hours as needed for mild pain   Quantity:  100 tablet   Refills:  0        metoprolol tartrate 25 MG tablet   Commonly known as:  LOPRESSOR   This may have changed:  how much to take   Used for:  Atrial fibrillation, unspecified type (H)        Dose:  12.5 mg   Take 0.5 tablets (12.5 mg) by mouth 2 times daily   Quantity:  60 tablet   Refills:  0          CONTINUE these medications which have NOT CHANGED        Dose / Directions Comments    atorvastatin 20 MG tablet   Commonly known as:  LIPITOR   Used for:  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)        Dose:  20 mg   Take 1 tablet (20 mg) by mouth daily   Quantity:  90 tablet   Refills:  1        bisacodyl 10 MG Suppository   Commonly known as:  DULCOLAX   Used for:  Slow transit constipation        Dose:  10 mg   Place 1 suppository (10 mg) rectally daily as needed for constipation   Quantity:  30 suppository   Refills:  0          STOP taking     aspirin 325 MG EC tablet                     Further instructions from your care team       Follow up:     --TCU provider within 2 weeks    -- Urology- follow up urinary retention    Address            [facility]                           [facility] [address]                           [place] [address]  Phone              [phone]  Or  Address  [facility]                          [facility]                          [address] [place] [address]; [address]                          [place] [address]  Phone   [phone]    -- Neurology- follow up stroke with [facility] stroke service- within 8 weeks.   Please call [phone] to schedule your appointment with Neurology.    Address  [facility], Neurology                          [address] [place] [address]; [address]                          [place] [address]  Phone   [phone]    -----------------------------------------------  To reduce the risk of subsequent stroke there are several important factors  "including optimal management of anticoagulants, blood pressure, cholesterol, diabetes and smoking abstinence.    Anticoagulation:  You are on Apixiban.     Blood Pressure:  Keeping your blood pressures less than 130/80 has been shown to reduce risk of recurrent stroke. You are currently on metoprolol to help control your blood pressure. Several lifestyle modifications have been associated with blood pressure reduction and are an important part of a comprehensive plan. These include: weight loss (if over-weight); a diet low in salt and cholesterol and rich in fruits and vegetables; regular aerobic physical activity and limited alcohol consumption.    Diabetes:  You do not have diabetes though it is important to continue monitoring for this in the future with your primary provider.    Diet:  Currently  you're on a diet modified for your swallow.  Diet can significantly affect your levels and should be part of your plan. Please see additional information from dietitian about this.    Cholesterol:  Traditional target levels for LDL cholesterol or \"bad cholesterol\" is less than 130 however once you have had a stroke, your target LDL level is now less than 70. Additional recommendations such as increasing your HDL or \"good\" cholesterol and lowering your triglyceride level can also be important.    Your most recent lipid panel was   Lab Results   Component Value Date    CHOL 174 04/30/2018     Lab Results   Component Value Date    HDL 35 04/30/2018     Lab Results   Component Value Date     04/30/2018     Lab Results   Component Value Date    TRIG 116 04/30/2018     This should be followed up in 2-3 months with your primary provider.    Smoking:  Continue to remain tobacco free!      Summary of Visit     Reason for your hospital stay       Admitted for rehabilitation following hospitalization for stroke.             After Care     Activity - Up with nursing assistance           Advance Diet as Tolerated       Follow " this diet upon discharge: Orders Placed This Encounter      Room Service      Combination Diet Dysphagia Diet Level 2: Mechan Altered; Thin Liquids (water, ice chips, juice, milk gelatin, ice cream, etc)       Bladder scan       X 2 for post void residual       General info for SNF       Length of Stay Estimate: Short Term Care: Estimated # of Days <30  Condition at Discharge: Improving  Level of care:skilled   Rehabilitation Potential: Good  Admission H&P remains valid and up-to-date: Yes  Recent Chemotherapy: N/A  Use Nursing Home Standing Orders: Yes       Mantoux instructions       Give two-step Mantoux (PPD) Per Facility Policy Yes             Referrals     Occupational Therapy Adult Consult       Evaluate and treat as clinically indicated.    Reason:  stroke       Physical Therapy Adult Consult       Evaluate and treat as clinically indicated.    Reason:  stroke       Speech Language Path Adult Consult       Evaluate and treat as clinically indicated.    Reason:  Stroke- impaired cognition, swallow       UROLOGY ADULT REFERRAL       Your provider has referred you to: [facility]: [facility] [place] [phone]   [email or web address]    Please be aware that coverage of these services is subject to the terms and limitations of your health insurance plan.  Call member services at your health plan with any benefit or coverage questions.      Please bring the following with you to your appointment:    (1) Any X-Rays, CTs or MRIs which have been performed.  Contact the facility where they were done to arrange for  prior to your scheduled appointment.    (2) List of current medications  (3) This referral request   (4) Any documents/labs given to you for this referral             Follow-Up Appointment Instructions     Future Labs/Procedures    Follow Up ([facility])     Comments:    Follow up with primary care provider, Gerardo Adhikari/ ZAHIDA jay, within 7 days follow up  hospitalization    Follow up with urology- follow up urinary retention    Follow up with neurology- f/u stroke.     Appointments on [place] and/or [facility] (with [facility] or [facility] provider or service). Call [phone] if you haven't heard regarding these appointments within 7 days of discharge.      Follow-Up Appointment Instructions     Follow Up ([facility])       Follow up with primary care provider, Gerardo Adhikari/ ZAHIDA jay, within 7 days follow up hospitalization    Follow up with urology- follow up urinary retention    Follow up with neurology- f/u stroke.     Appointments on [place] and/or [facility] (with [facility] or [facility] provider or service). Call [phone] if you haven't heard regarding these appointments within 7 days of discharge.             Statement of Approval     Ordered          05/22/18 1213  I have reviewed and agree with all the recommendations and orders detailed in this document.  EFFECTIVE NOW     Approved and electronically signed by:  Naima Moya MD

## 2018-05-03 NOTE — H&P
[facility]           ACUTE [facility]                HISTORY AND PHYSICAL NOTE         Patient Name: Angelito Castellanos   YOB: 1925  MRN: [id number]     Age / Sex: 89 year old male    Admit Date: 05/03/18    Reason for Admission: intense rehabilitation for stroke    History of Present Illness  Angelito Castellanos is a 89 year old male with past medical history significant for chronic atrial fibrillation not on anticoagulation, diabetes that is diet-controlled who is being admitted to the ARU on 05/03/18    He initially presented to [facility] on 4- with slurred speech, left facial droop and left-sided weakness.  He was admitted, initial CT scan showed stroke involving the right frontal operculum as well as lacunar infarcts within the left basal ganglia of unclear chronicity.  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  He was seen by neurology but felt not to be a candidate for thrombectomy.    Patient has a history of refusing medications in the past, having had refused anticoagulation for the atrial fibrillation and medications for the diabetes  His hemoglobin A1c is noted to be 7.0 with diet control.    JORGE was done and the bubble study was negative  After much discussion with the patient and his 2 daughters it was decided to put him on full dose aspirin for 2 weeks after which he is to be on Eliquis.  This decision was made due to the concern of possible falls while on anticoagulation    He does have history of right rotator cuff syndrome which is chronic, however with the overuse given the new left-sided weakness he has had increased pain in his right shoulder.  He is currently on scheduled Tylenol with good relief.    DD1 with nectar thickened liquids        Functionally, the patient was independent with all aspects of mobility and ADLs.  He ambulates in the park daily for exercise.  He reports that he cooks his own meals      Currently, the patient is medically appropriate and is assessed to have needs and will benefit from an inpatient acute rehabilitation comprehensive program working with Physical and Occupational Therapist and will benefit from supervision and management of Rehab Nursing and Rehab MD.     Allergies  No Known Allergies    Past Medical and Surgical History  Past Medical History:   Diagnosis Date     Chronic atrial fibrillation (H)      [place] (hard of hearing)      Prediabetes      Past Surgical History:   Procedure Laterality Date     APPENDECTOMY       CARDIAC SURGERY      pacemaker     NASAL/SINUS POLYPECTOMY           Social History  The patient lives with his 2 daughters in [place].  They live in a split-level home with 1 step to enter and 7 steps to the bedroom with rail.    The patient was previously independent with ambulation without use of cane or walker, previously independent with upper and lower body self care, ADLs, and IADLs.     He reports that his daughter doing works for the [employer]    Review of Systems  10 point ROS neg other than the symptoms noted above in the HPI and below:  Review Of Systems  Reports left-sided weakness  Right shoulder pain secondary to rotator cuff syndrome  Reports weakness of his face on the left  Feels unsteady when walking  He is on dysphagia diet DD 1 with nectar thickened liquids  Last bowel movement was today  He has had incontinence and has required straight cath this admission  Reports fatigue  Reports significant hearing loss bilaterally   remainder of the review of the systems was negative.      Physical Examination  Temp: 96.3  F (35.7  C) Temp src: Oral BP: 122/64 Pulse: 78   Resp: 16 SpO2: 97 % O2 Device: None (Room air)    Angelito is lying in bed, is comfortable in no  acute distress  He is quite hard of hearing  He is slow in processing, however he does comprehend well  He has generalized atrophy  He appears to have significant tolerance to pain and other symptoms and tends not to linger on any deficits  He is oriented to the year and month  General Awake, alert, not in distress  HEENT EOMs intact  Cardiac Regular  Respiratory Clear breath sounds  Abdomen Soft, nontender      Neurologic   Alert and oriented x 3  Speech is clear  Is hard of hearing  He has generalized atrophy  Muscle strength examination in the uppers is 3 out of 5 proximally and 5 out of 5 distally  He has decreased coordination in his right hand  He is right-handed  Examination of the lower extremities is consistent with 3 out of 5 proximally and 5 out of 5 distally      Labs  Lab Results   Component Value Date    WBC 7.7 04/30/2018         Lab Results   Component Value Date    RBC 4.68 04/30/2018     Lab Results   Component Value Date    HGB 14.4 04/30/2018     Lab Results   Component Value Date    HCT 41.6 04/30/2018     No components found for: MCT  Lab Results   Component Value Date    MCV 89 04/30/2018     Lab Results   Component Value Date    MCH 30.8 04/30/2018     Lab Results   Component Value Date    MCHC 34.6 04/30/2018     Lab Results   Component Value Date    RDW 14.1 04/30/2018     Lab Results   Component Value Date     04/30/2018       Lab Results   Component Value Date     04/30/2018      Lab Results   Component Value Date    POTASSIUM 4.0 04/30/2018     Lab Results   Component Value Date    CHLORIDE 109 04/30/2018     Lab Results   Component Value Date    EDMUNDO 8.2 04/30/2018     Lab Results   Component Value Date    CO2 26 04/30/2018     Lab Results   Component Value Date    BUN 17 04/30/2018     Lab Results   Component Value Date    CR 1.05 04/30/2018     Lab Results   Component Value Date     04/30/2018     No components found for: MRI      Medications  No current  facility-administered medications for this encounter.      Current Outpatient Prescriptions   Medication Sig     acetaminophen (TYLENOL) 325 MG tablet Take 2 tablets (650 mg) by mouth 3 times daily     [START ON 5/4/2018] aspirin 325 MG EC tablet Take 1 tablet (325 mg) by mouth daily     atorvastatin (LIPITOR) 20 MG tablet Take 1 tablet (20 mg) by mouth daily     bisacodyl (DULCOLAX) 10 MG Suppository Place 1 suppository (10 mg) rectally daily as needed for constipation     metoprolol tartrate (LOPRESSOR) 25 MG tablet Take 1 tablet (25 mg) by mouth 2 times daily     Facility-Administered Medications Ordered in Other Encounters   Medication     acetaminophen (TYLENOL) tablet 650 mg     aspirin EC tablet 325 mg    Or     aspirin Suppository 300 mg     bisacodyl (DULCOLAX) Suppository 10 mg     hydrALAZINE (APRESOLINE) injection 10-20 mg     labetalol (NORMODYNE/TRANDATE) injection 10-40 mg     lidocaine (LMX4) cream     lidocaine 1 % 1 mL     magnesium sulfate 4 g in 100 mL sterile water (premade)     Medication Instruction     metoprolol (LOPRESSOR) injection 5 mg     metoprolol tartrate (LOPRESSOR) tablet 25 mg     naloxone (NARCAN) injection 0.1-0.4 mg     ondansetron (ZOFRAN-ODT) ODT tab 4 mg    Or     ondansetron (ZOFRAN) injection 4 mg     polyethylene glycol (MIRALAX/GLYCOLAX) Packet 17 g     potassium chloride (KLOR-CON) Packet 20-40 mEq     potassium chloride 10 mEq in 100 mL intermittent infusion with 10 mg lidocaine     potassium chloride 10 mEq in 100 mL sterile water intermittent infusion (premix)     potassium chloride 20 mEq in 50 mL intermittent infusion     potassium chloride SA (K-DUR/KLOR-CON M) CR tablet 20-40 mEq     prochlorperazine (COMPAZINE) injection 5 mg    Or     prochlorperazine (COMPAZINE) tablet 5 mg    Or     prochlorperazine (COMPAZINE) Suppository 12.5 mg     senna-docusate (SENOKOT-S;PERICOLACE) 8.6-50 MG per tablet 1 tablet    Or     senna-docusate (SENOKOT-S;PERICOLACE) 8.6-50 MG  per tablet 2 tablet     sodium chloride (OCEAN) 0.65 % nasal spray 1-2 spray     sodium chloride (PF) 0.9% PF flush 3 mL     sodium chloride (PF) 0.9% PF flush 3 mL     sodium chloride 0.9% infusion         ASSESSMENT / MANAGEMENT AND INITIATION OF PLAN OF CARE:      POST-ADMISSION EVALUATION:  I have evaluated the patient on admission to the Acute Rehab Center and compared with the preadmission screen, no significant differences are identified and remains appropriate for acute rehabilitation. See below for more details and specifics regarding this admission that supports requiring medical and therapy intensity in the acute rehab setting.      Patient will work with PT for 60 min daily to work on gait exercises, strengthening, endurance buildup, transfers with use of walker as needed.   Patient will work with OT for 60 min daily to work on upper and lower body self care, dressing, toileting, bathing, energy conservation techniques with use of ADs as needed.   Patient will work with SLP for 60 min daily for cognitive evaluation and treatment strategies for higher level cognitive deficits and memory impairment.   Rehab RN to administer medication, patient education on medication taking, VS monitoring, and surgical wound dressing changes and monitoring.     Medical Management:  Acute right MCA embolic stroke, likely embolic given the history of chronic atrial fibrillation;  He is currently on full dose aspirin for 2 weeks, with recommendations to start on Eliquis.  He has declined anticoagulation for chronic atrial fibrillation in the past.  He continues to have mildly slurred speech, facial droop, and left-sided weakness, but overall this has improved compared to initial onset.   We will continue aspirin 325 PO/325 UT daily for now    Permissive hypertension; review of his blood pressures is consistent with high systolic blood pressures of 192 and high diastolic blood pressures up to 103.  Per my discussion with   "Terence, metoprolol was added prior to discharge.  Closely monitor the blood pressures especially during the therapy sessions and titrate the beta blockers as needed.    Chronic atrial fibrillation s/p PPM  [PTA: metoprolol 25 mg BID] As noted above, he has declined anticoagulation in the past stating that he was offered \"rat poison.\"  -back on metformin.      History of diabetes;  HgbA1c is 7.0 this admit.  Patient has discussed this with his PCP regarding diet controlled and has elected to not add medications at this age.    Dysphagia  His diet has been started as dysphagia level 1, nectar thickened liquids, he is tolerating it well    Bladder, Bowel, GI and DVT ppx   Bladder ;check PVRs ×3 straight cath for volumes more than 350 cc.  Monitor for symptoms of urinary tract infection, rehab nursing to send for a UA as needed.  He has had urinary retention while in [facility] requiring straight catheterization    Bowels; place the patient on a bowel program and titrate medications as needed.  Hold the bowel program in case of loose stools.  Educated patient on Impact with fiber and fluid intake on a bowel function    DVT prophylaxis with mechanical means  Code Status DNR DNI    Prognosis for medical improvement and stabilization of the medical issues for discharge to home is good.     Estimated Length of Stay: 1-2 weeks       Post Admission Physician Evaluation:     I have compared Angelito Castellanos's condition on admission to acute rehabilitation to that outlined in the preadmission screen. History and physical exam performed by me.    No significant differences are identified and the patient remains appropriate for an inpatient rehabilitation facility level of care to manage medical issues and address functional impairments due to stroke     Comorbid medical conditions being managed:   Chronic atrial fibrillation, right MCA stroke, hyperlipidemia, dysphagia.      Prior functional level: Previously independent with " mobility and ADLs, although had become less active over the last several months due to progression of illness.      Present function: He is presently requiring min assist to contact-guard assist of 1 for ambulating with a walker easily fatigable.  Limited by left-sided weakness impaired balance and safety awareness  He is requiring min to mod assist of 2 for lower body dressing he is on dysphagia diet 1 with nectar thickened liquids     Anticipated rehabilitation course: Anticipate he  will require 1-2 weeks. Anticipate he will be discharged home with his family for support      Will benefit from intensive rehabilitation includin minutes each of PT, OT and SLP - seven days a week. Anticipate he will be able to tolerate the intensity of the therapies.        Rehabilitation nursing; has rehab nursing needs in the reenforcement of the strategies, taught by the therapists, and bowel and bladder management   Close management by physiatry.      Prognosis:  fair    Estimated length of stay: 1-2 weeks        Bre Graves MD, MHA   Department of Physical Medicine and Rehabilitation  [facility]           Total time spent: 70 minutes with more than half the time was spent counseling and / or coordination of care   Includes counseling / education / discussion     Bre Graves MD, MHA

## 2018-05-03 NOTE — PROVIDER NOTIFICATION
"Text to MD  \" Family is asking for scheduled Tylenol for right shoulder pain.\"    0918: Update MD on need for straight cath on nocs  "

## 2018-05-03 NOTE — PLAN OF CARE
Problem: Patient Care Overview  Goal: Plan of Care/Patient Progress Review  Outcome: Adequate for Discharge Date Met: 05/03/18  Neuros stable.  Slight dysarthria, slow to respond at times.  RLE slightly weaker than left.  Disorientated to exact time & place.  C/o right shoulder pain, tylenol given with relief.  Up with assist 1, walker and gaitbelt to chair.  Incontinent of urine x 2.  Bladder scan for 262cc, pt currently voiding. Plan to dc to ARU at 1400.

## 2018-05-03 NOTE — PLAN OF CARE
Problem: Patient Care Overview  Goal: Plan of Care/Patient Progress Review  Outcome: No Change  A&O. HTN- within parameters. other VSS. Tele- v paced. Up w/1 and walker. Incontinent B&B. Bladder scanned for 400 mL- Strait cath- 500 mL. DD1 w/nectar diet. L facial weakness. Denies pain.

## 2018-05-03 NOTE — DISCHARGE SUMMARY
[facility] [place]    Discharge Summary  Hospitalist    Date of Admission:  4/29/2018  Date of Discharge:  5/3/2018  Discharging Provider: Selene Pratt MD    Discharge Diagnoses   Cerebrovascular accident      History of Present Illness   Angelito Castellanos is a 89 year old male with hx of chronic a-fib s/p PPM and not on anticoagulation who presents with slurred speech, left facial droop, and left sided weakness. History if obtained from the patient and his family who are at bedside. The patient lives with his daughter and her boyfriend. He was last seen normal around 10:30-11a this morning. His daughter left for a few hours, and when her boyfriend arrived at the house several hours later, he noticed that the patient was slumped over in a chair with apparent facial droop, left-sided weakness, and slurred speech. EMS was activated at that time.      In the ED, initial CT head showed evidence of stroke involving the right frontal operculum as well as lacunar infarct within the left basal ganglia of unclear chronicity. CTA head/neck showed focal cutoff of the proximal right M1 segment concerning for embolism, atherosclerosis of the carotid arteries. 2mm aneurysm involving the right ICA, and mild stenosis of the left subclavian artery. Code stroke was activated. Neurology did not feel a thrombectomy was warranted.    Hospital Course   Angelito Castellanos was admitted on 4/29/2018.  The following problems were addressed during his hospitalization:    Active Problems:    Acute embolic stroke (H)    Patient has  chronic atrial fibrillation and declined anticoagulation in the past. Initial head CT showed stroke involving the right frontal operculum as well as lacunar infarct within the left basal ganglia of unclear chronicity. CTA head/neck showed focal cutoff of the proximal right M1 segment concerning for embolism, atherosclerosis of the carotid arteries, 2mm aneurysm involving the right ICA, and mild stenosis of the left  subclavian artery. Neurology did not feel patient was a candidate for thrombectomy. He continues to have mildly slurred speech, facial droop, and left-sided weakness, but overall this has improved compared to initial onset.   Of note, patient's pacemaker is not MRI compatible so unable to obtain.   TTE/bubble study done and no new changes noted,repeat CT head 4/30 shows stable cerebrovascular accident  .  PT, OT and speech evaluated patient here, PT and OT recommended acute rehab.  He had significant dysphagia in the beginning.  Later video swallow was done and his diet has been adjusted to dysphagia level 1  with the honey thick liquid.  He is currently tolerating it well.  He was seen by neurology here and as per discussion the plan is to continue him on full dose aspirin for 2 weeks and then start him on apixaban after that.  His chronic atrial fibrillation is rate controlled with the metoprolol 25 mg p.o. twice daily.  The plan is to transfer him for further rehab and then acute rehab facility.  Discussed plan of care with the daughter near bedside.  # Discharge Pain Plan:- During his hospitalization, Angelito experienced pain due to shoulder arthritis .  The pain plan for discharge was discussed with Angelito and the plan was created in a collaborative fashion.    - Pharmacologic adjuvants:  Acetaminophen    Selene Pratt MD    Significant Results and Procedures       Pending Results     Unresulted Labs Ordered in the Past 30 Days of this Admission     No orders found from 2/28/2018 to 4/30/2018.          Code Status   DNR / DNI       Primary Care Physician   Gerardo Adhikari    Physical Exam   Temp: 98.1  F (36.7  C) Temp src: Oral BP: 124/72 Pulse: 72 Heart Rate: 90 Resp: 16 SpO2: 98 % O2 Device: None (Room air)    Vitals:    05/01/18 0625 05/02/18 0506 05/03/18 0614   Weight: 70.8 kg (156 lb) 57.1 kg (125 lb 12.8 oz) 56.5 kg (124 lb 9.6 oz)     Vital Signs with Ranges  Temp:  [97.5  F (36.4  C)-98.5  F (36.9   C)] 98.1  F (36.7  C)  Pulse:  [68-76] 72  Heart Rate:  [59-90] 90  Resp:  [16-18] 16  BP: (100-155)/() 124/72  SpO2:  [94 %-99 %] 98 %  I/O last 3 completed shifts:  In: 2361 [P.O.:480; I.V.:1881]  Out: 75 [Urine:75]    The patient was examined on the day of discharge.    Discharge Disposition   Discharged to rehabilitation facility  Condition at discharge: Stable    Consultations This Hospital Stay   NEUROLOGY IP CONSULT  PHYSICAL THERAPY ADULT IP CONSULT  OCCUPATIONAL THERAPY ADULT IP CONSULT  SPEECH LANGUAGE PATH ADULT IP CONSULT  SWALLOW EVAL SPEECH PATH AT BEDSIDE IP CONSULT  SOCIAL WORK IP CONSULT  PHYSICAL THERAPY ADULT IP CONSULT  OCCUPATIONAL THERAPY ADULT IP CONSULT  SPEECH LANGUAGE PATH ADULT IP CONSULT    Time Spent on this Encounter   ISelene, personally saw the patient today and spent greater than 30 minutes discharging this patient.    Discharge Orders     General info for SNF   Length of Stay Estimate: Short Term Care: Estimated # of Days <30  Condition at Discharge: Improving  Level of care:skilled   Rehabilitation Potential: Excellent  Admission H&P remains valid and up-to-date: Yes  Recent Chemotherapy: N/A  Use Nursing Home Standing Orders: Yes     Mantoux instructions   Give two-step Mantoux (PPD) Per Facility Policy Yes     Reason for your hospital stay   Cerebrovascular accident     Daily weights   Call Provider for weight gain of more than 2 pounds per day or 5 pounds per week.     Bladder scan   X 2 for post void residual  Straight cath if needed     Follow Up and recommended labs and tests   Follow up with primary care provider in 10 days.  No follow up labs or test are needed.     Activity - Up with nursing assistance     Additional Discharge Instructions   Had one episode of urinary retention, follow up on that , started on Flomax, he will need anticoagulation started in 2 weeks, the newer anticoagulants are preferred for atrial fibrillation  With cerebrovascular  accident  As the patient  does not want  warfarin .apixaban recommended by neurology and stop aspirin in 2 weeks.  SBP cap 220, with goal of titrating BP medications for goal of normotension over 2-3 weeks.     DNR/DNI     Physical Therapy Adult Consult   Evaluate and treat as clinically indicated.    Reason:  cva     Occupational Therapy Adult Consult   Evaluate and treat as clinically indicated.    Reason:  cva     Speech Language Path Adult Consult   Evaluate and treat as clinically indicated.    Reason:  Cerebrovascular accident     Fall precautions     Advance Diet as Tolerated   Follow this diet upon discharge: Orders Placed This Encounter     Snacks/Supplements Adult: Other - Please comment; magic shakes; Between Meals     Combination Diet Dysphagia Diet Level 1: Pureed; Nectar Thickened Liquids (pre-thickened or use instant food thickener)       Discharge Medications   Current Discharge Medication List      START taking these medications    Details   atorvastatin (LIPITOR) 20 MG tablet Take 1 tablet (20 mg) by mouth daily  Qty: 90 tablet, Refills: 1    Associated Diagnoses: 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)      acetaminophen (TYLENOL) 325 MG tablet Take 2 tablets (650 mg) by mouth 3 times daily  Qty: 100 tablet    Associated Diagnoses: 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)      aspirin 325 MG EC tablet Take 1 tablet (325 mg) by mouth daily  Qty: 40 tablet    Associated Diagnoses: 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)      bisacodyl (DULCOLAX) 10 MG Suppository Place 1 suppository (10 mg) rectally daily as needed for constipation  Qty: 30 suppository    Associated Diagnoses: Slow transit constipation         CONTINUE these medications which have CHANGED    Details   metoprolol tartrate (LOPRESSOR) 25 MG tablet Take 1 tablet (25 mg) by mouth 2 times daily  Qty: 60 tablet    Associated Diagnoses: Cerebral  infarction due to embolism of left middle cerebral artery (H)           Allergies   No Known Allergies  Data   Most Recent 3 CBC's:  Recent Labs   Lab Test  04/30/18   0621  04/29/18   1822   WBC  7.7  7.0   HGB  14.4  15.4   MCV  89  89   PLT  154  162      Most Recent 3 BMP's:  Recent Labs   Lab Test  04/30/18   0621  04/29/18   1822   NA  142  141   POTASSIUM  4.0  4.4   CHLORIDE  109  107   CO2  26  27   BUN  17  21   CR  1.05  0.95   ANIONGAP  7  7   EDMUNDO  8.2*  8.9   GLC  121*  120*     Most Recent 2 LFT's:No lab results found.  Most Recent INR's and Anticoagulation Dosing History:  Anticoagulation Dose History     Recent Dosing and Labs Latest Ref Rng & Units 4/29/2018    INR 0.86 - 1.14 1.03        Most Recent 3 Troponin's:  Recent Labs   Lab Test  04/30/18   2150  04/30/18   1440  04/30/18   0621   TROPI  <0.015  <0.015  <0.015     Most Recent Cholesterol Panel:  Recent Labs   Lab Test  04/30/18   0621   CHOL  174   LDL  116*   HDL  35*   TRIG  116     Most Recent 6 Bacteria Isolates From Any Culture (See EPIC Reports for Culture Details):No lab results found.  Most Recent TSH, T4 and A1c Labs:  Recent Labs   Lab Test  04/29/18   1822   TSH  2.33   A1C  7.0*     Results for orders placed or performed during the hospital encounter of 04/29/18   CTA Angiogram Head Neck    Narrative    CT ANGIOGRAM OF THE HEAD AND NECK WITH CONTRAST  4/29/2018 6:41 PM     HISTORY: Code Stroke;     TECHNIQUE:  CT angiography with an injection of 70 mL Isovue-370 IV  with scans through the head and neck.  Images were transferred to a  separate 3-D workstation where multiplanar reformations and 3-D images  were created.  Estimates of carotid stenoses are made relative to the  distal internal carotid artery diameters except as noted. Radiation  dose for this scan was reduced using automated exposure control,  adjustment of the mA and/or kV according to patient size, or iterative  reconstruction technique.    COMPARISON: None.      CT HEAD FINDINGS: No contrast enhancing lesions. Cerebral blood flow  is grossly normal.     CT ANGIOGRAM HEAD FINDINGS:  Focal vascular cutoff of the proximal  right M1 segment concerning for embolism. There is relatively poor  collateralization of the distal vessels particularly along the  anterior right MCA aspect.    No other vascular cutoff is appreciated of the proximal intracranial  arteries.  No significant stenosis.    Inferiorly directed 2 mm aneurysm at the supraclinoid right internal  carotid artery.    CT ANGIOGRAM NECK FINDINGS:   There is atherosclerotic disease at the aortic arch and origins of the  great vessels. This results in mild stenosis at the origin of the left  subclavian artery.     Right carotid artery: The right common and internal carotid arteries  are patent. Marked soft and calcified plaque at the carotid  bifurcation and proximal internal carotid artery resulting in  approximately 52% stenosis by NASCET criteria.     Left carotid artery: The left common and internal carotid arteries are  patent. Mild soft and calcified plaque at the left carotid bifurcation  without significant stenosis by NASCET criteria.     Vertebral arteries: Vertebral arteries are patent without evidence of  dissection. No significant stenosis.     Other findings: Emphysematous changes in the visualized lung apices.  Pacer wire partially visualized. Multilevel degenerative changes in  the spine.      Impression    IMPRESSION:   1. Focal vascular cutoff of the proximal right M1 segment concerning  for embolism. Relatively poor collaterization of the distal right MCA  vessels particularly anteriorly.  2. Atherosclerotic disease at the right carotid bifurcation resulting  in 52% stenosis by NASCET criteria. Mild atherosclerotic disease at  the left carotid bifurcation without significant stenosis.  3. Patent arteries in the neck without evidence of dissection. Mild  stenosis at the origin of the left subclavian  artery.  4. 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    Results of proximal right M1 cutoff discussed with Ronnie Barajas at 6:45  PM on 4/29/2018.     JUSTINE RIBERA MD   CT Head w Contrast    Narrative    CT BRAIN PERFUSION 4/29/2018 6:54 PM    HISTORY: Code Stroke.      TECHNIQUE: Time sequential axial CT images of the head were acquired  during the administration of 50 mL Isovue-370 IV. Color perfusion maps  of the brain were created from this time sequential axial source data.      Radiation dose for this scan was reduced using automated exposure  control, adjustment of the mA and/or kV according to patient size, or  iterative reconstruction technique.    COMPARISON: None.    FINDINGS: There is a large perfusion defect within the right MCA  territory with increased time to drain and Tmax involving almost the  entirety of the right MCA territory. There is corresponding decrease  in cerebral blood volume and cerebral blood flow also in the right MCA  territory involving at least 50% of the abnormal area.      Impression    IMPRESSION: Large area of ischemia involving the right MCA territory  also with marked decreased cerebral blood volume suggesting core  infarct involving at least greater than 50% of the area of ischemia.     JUSTINE RIBERA MD   CT Head w/o Contrast    Narrative    CT SCAN OF THE HEAD WITHOUT CONTRAST   4/29/2018 6:34 PM     HISTORY: Code Stroke.      TECHNIQUE:  Axial images of the head and coronal reformations without  IV contrast material. Radiation dose for this scan was reduced using  automated exposure control, adjustment of the mA and/or kV according  to patient size, or iterative reconstruction technique.    COMPARISON: None.    FINDINGS: There is area of gliosis with gray-white matter  differentiation loss in the right frontal operculum that has a chronic  appearance although acute region of ischemia cannot be excluded. Focal  hypodensity in the  left basal ganglia likely represents a lacunar  infarct which may be acute or chronic.    No evidence of acute intracranial hemorrhage. No mass effect or  midline shift. Mild diffuse parenchymal volume loss. Periventricular  white matter hypodensities are likely related to chronic microvascular  ischemic disease. Ventricular size within normal limits without  hydrocephalus.    Scattered mucosal thickening in the paranasal sinuses. The bony  calvarium and bones of the skull base appear intact.       Impression    IMPRESSION:  1. Focal area of gliosis in the right frontal operculum with loss of  gray-white matter differentiation. This has the appearance of a more  chronic infarct although an acute area of ischemia cannot be excluded.  This could be better evaluated with MRI if the patient is able.  2. Lacunar infarct within the left basal ganglia which may be acute or  chronic.  3. Diffuse parenchymal volume loss and white matter changes likely due  to chronic microvascular ischemic disease.    Results discussed with Ronnie Barajas at 6:40 PM on 4/29/2018.      JUSTINE RIBERA MD   CT Head w/o Contrast    Narrative    CT OF THE HEAD WITHOUT CONTRAST 4/30/2018 4:43 PM     COMPARISON: Head CT 4/29/2018    HISTORY: Evaluation of infarct burden.    TECHNIQUE: 5 mm thick axial CT images of the head were acquired  without IV contrast material.    FINDINGS: There are new areas of loss of gray-white differentiation  involving the anterior third of the right middle cerebral artery  territory including portions of the right basal ganglia and right  frontal lobe as well as the deep white matter in the left frontal lobe  in the left middle cerebral artery distribution. These areas are  consistent with evolving ischemic infarcts.    There is mild diffuse cerebral volume loss. There are subtle patchy  areas of decreased density in the cerebral white matter bilaterally  that are consistent with sequela of chronic small vessel  ischemic  disease disease. The ventricles and basal cisterns are within normal  limits in configuration given the degree of cerebral volume loss.   There is no midline shift. There are no extra-axial fluid collections.    No intracranial hemorrhage or mass.    The visualized paranasal sinuses are well-aerated. There is no  mastoiditis. There are no fractures of the visualized bones.      Impression    IMPRESSION:   1. Evolving ischemic infarcts in the right and left middle cerebral  artery territories.  2. Diffuse cerebral volume loss and cerebral white matter changes  consistent with chronic small vessel ischemic disease.        Radiation dose for this scan was reduced using automated exposure  control, adjustment of the mA and/or kV according to patient size, or  iterative reconstruction technique    TIFFANIE SILVA MD   XR Video Swallow w/o Esophagram    Narrative    VIDEO SWALLOWING EVALUATION  5/2/2018 9:41 AM     HISTORY: Right CVA.     COMPARISON: None.    FLUOROSCOPY TIME: 2.0 minutes     Number of cine runs: 9    FINDINGS:    Thin: Premature spill to the piriforms. One episode of deep  penetration. Otherwise normal.    Nectar: Premature spill to the vallecula. Otherwise normal.    Honey: Not administered.    Pudding: Normal.    Semisolid: Moderate vallecular residue. Otherwise normal.    Solid: Not administered.    This study only includes the cervical esophagus.    TIFFANIE SILVA MD

## 2018-05-03 NOTE — DISCHARGE INSTRUCTIONS
Continue thickened supplements at ARU.    You are discharging to:    [facility] -5th Floor  [facility] [place]  [address]    [phone]

## 2018-05-03 NOTE — IP AVS SNAPSHOT
"` `     [facility]: [phone]                                              INTERAGENCY TRANSFER FORM - NURSING   5/3/2018                    Hospital Admission Date: 5/3/2018  ALISHA STOCKTON   : 1925  Sex: Male        Attending Provider: Naima Moya MD     Allergies:  No Known Allergies    Infection:  None   Service:  ACUTE IP ROMARIO    Ht:  1.798 m (5' 10.8\")   Wt:  66 kg (145 lb 9.6 oz)   Admission Wt:  71.6 kg (157 lb 12.8 oz)    BMI:  20.42 kg/m 2   BSA:  1.82 m 2            Patient PCP Information     Provider PCP Type    Gerardo Adhikari MD General      Current Code Status     Date Active Code Status Order ID Comments User Context       Prior      Code Status History     Date Active Date Inactive Code Status Order ID Comments User Context    2018  4:53 PM  DNR/DNI [id number]  Lottie Alarcon PA Outpatient    5/3/2018  3:56 PM 2018  4:53 PM DNR/DNI [id number]  Naima Moya MD Inpatient    5/3/2018 11:58 AM 5/3/2018  3:56 PM DNR/DNI [id number]  Selene Pratt MD Outpatient    2018  9:30 PM 5/3/2018 11:58 AM DNR/DNI [id number]  Gabriela Zaidi MD Inpatient      Advance Directives        Scanned docmt in ACP Activity?           Yes, scanned ACP docmt        Hospital Problems as of 2018              Priority Class Noted POA    Stroke (H) Medium  5/3/2018 Yes      Non-Hospital Problems as of 2018              Priority Class Noted    Acute embolic stroke (H) Medium  2018      Immunizations     None         END      ASSESSMENT     Discharge Profile Flowsheet     EXPECTED DISCHARGE     Resources List  Acute Rehab 18 1146    Expected Discharge Date  18 1333   SKIN      DISCHARGE NEEDS ASSESSMENT     Inspection of bony prominences  Full 18 1058    Equipment Currently Used at Home  cane, straight (uses walking stick when walks in park, no AD in home) 18 0933   Full except areas not inspected   Occiput;Scapula, left;Scapula, " "right;Ear, right;Ear, left 05/21/18 0340    Transportation Available  car;family or friend will provide 05/04/18 1540   Inspection under devices  Full 05/21/18 1938    # of Referrals Placed by CTS  Post Acute Facilities 05/03/18 1146   Skin WDL  ex 05/22/18 1058    GASTROINTESTINAL (ADULT,PEDIATRIC,OB)     Skin Temperature  warm 05/21/18 1938    GI WDL  WDL 05/22/18 1058   Skin Moisture  dry 05/21/18 1938    Last Bowel Movement  05/21/18 05/22/18 0045   Skin Elasticity  slow return to original state 05/21/18 1938    GI Signs/Symptoms  -- 05/22/18 0045   Skin Integrity  bruise(s);wound(s) 05/22/18 1058    Passing flatus  yes 05/16/18 0008   SAFETY      COMMUNICATION ASSESSMENT     Safety WDL  WDL 05/22/18 1058    Patient's communication style  spoken language (English or Bilingual) 04/29/18 2300   Safety Factors  patient up in chair;bed in low position;wheels locked;call light in reach;upper side rails raised x 2;ID band on 05/22/18 1058    FINAL RESOURCES     All Alarms  none present 05/22/18 1058                 Assessment WDL (Within Defined Limits) Definitions           Safety WDL     Effective: 09/28/15    Row Information: <b>WDL Definition:</b> Bed in low position, wheels locked; call light in reach; upper side rails up x 2; ID band on<br> <font color=\"gray\"><i>Item=AS safety wdl>>List=AS safety wdl>>Version=F14</i></font>      Skin WDL     Effective: 09/28/15    Row Information: <b>WDL Definition:</b> Warm; dry; intact; elastic; without discoloration; pressure points without redness<br> <font color=\"gray\"><i>Item=AS skin wdl>>List=AS skin wdl>>Version=F14</i></font>      Vitals     Vital Signs Flowsheet     COMMENTS     CLINICALLY ALIGNED PAIN ASSESSMENT (CAPA) ([facility], [facility] AND [facility] ADULTS ONLY)      Comments  after 15 minutes nustep 05/14/18 1010   Comfort  negligible pain 05/13/18 1706    VITAL SIGNS     Functioning  can do everything I need to 05/04/18 2036    Temp  95.3  F (35.2  C) 05/22/18 0859   " "HEIGHT AND WEIGHT      Temp src  Oral 05/22/18 0859   Height  1.798 m (5' 10.8\") 05/03/18 1521    Resp  16 05/22/18 0859   Weight  66 kg (145 lb 9.6 oz) 05/18/18 1338    Pulse  69 05/22/18 1211   Weight Method  Standing scale 05/18/18 1346    Heart Rate  75 05/21/18 2024   BSA (Calculated - sq m)  1.89 05/03/18 1521    Pulse/Heart Rate Source  Monitor;Brachial 05/22/18 1211   BMI (Calculated)  22.18 05/03/18 1521    BP  108/60 05/22/18 1211   POSITIONING      BP Location  Left arm 05/22/18 1211   Body Position  independently positioning 05/22/18 1058    Patient Position  Lying 05/06/18 0612   Head of Bed (HOB)  HOB at 15 degrees 05/21/18 2302    OXYGEN THERAPY     Positioning/Transfer Devices  pillows;in use 05/21/18 2302    SpO2  98 % 05/22/18 0859   Chair  Upright in chair 05/22/18 1058    O2 Device  None (Room air) 05/22/18 0859   DAILY CARE      PAIN/COMFORT     Activity Management  activity adjusted per tolerance;up in chair 05/22/18 1058    Patient Currently in Pain  denies 05/22/18 0859   Activity Assistance Provided  assistance, 1 person 05/22/18 1058    Preferred Pain Scale  CAPA (Clinically Aligned Pain Assessment) ([facility], [facility] and [facility] Adults Only) 05/21/18 0323   Assistive Device Utilized  gait belt;walker 05/22/18 1058    Pain Location  Back 05/06/18 1018   Symptoms Noted During/After Activity  none 05/22/18 1058    Pain Descriptors  Sore 05/06/18 1018   Elimination Assistance  up to toilet;diaper changed 05/22/18 1058    Pain Intervention(s)  Medication (See eMAR) (scheduled tylenol) 05/06/18 1018                 Patient Lines/Drains/Airways Status    Active LINES/DRAINS/AIRWAYS     Name: Placement date: Placement time: Site: Days: Last dressing change:    Wound 05/19/18 2200 05/19/18 2200    2             Patient Lines/Drains/Airways Status    Active PICC/CVC     None            Intake/Output Detail Report     Date Intake Output   Net    Shift P.O. Total Urine Wound Total       Day 05/21/18 " 0000 - 05/21/18 0659 -- -- 500 -- 500 -500    Kena 05/21/18 0700 - 05/21/18 1459 -- -- -- -- -- 0    Noc 05/21/18 1500 - 05/21/18 2359 120 120 -- -- -- 120    Day 05/22/18 0000 - 05/22/18 0659 -- -- 600 -- 600 -600    Kena 05/22/18 0700 - 05/22/18 1459 -- -- -- -- -- 0      Last Void/BM       Most Recent Value    Urine Occurrence (P) 1 at 05/22/2018 1200    Stool Occurrence 0 at 05/20/2018 1149      Case Management/Discharge Planning     Case Management/Discharge Planning Flowsheet     REFERRAL INFORMATION     Transportation Available  car;family or friend will provide 05/04/18 1540    Arrived From  home or self-care 05/03/18 1146   FINAL RESOURCES      # of Referrals Placed by CTS  Post Acute Facilities 05/03/18 1146   Equipment Currently Used at Home  cane, straight (uses walking stick when walks in park, no AD in home) 05/04/18 0933    LIVING ENVIRONMENT     Resources List  Acute Rehab 05/03/18 1146    Lives With  child(jeanette), adult 05/04/18 1540   ABUSE RISK SCREEN      Living Arrangements  house 05/04/18 1540   QUESTION TO PATIENT:  Has a member of your family or a partner(now or in the past) intimidated, hurt, manipulated, or controlled you in any way?  no 05/03/18 1652    COPING/STRESS     QUESTION TO PATIENT: Do you feel safe going back to the place where you are living?  yes 05/03/18 1652    Major Change/Loss/Stressor  hospitalization 05/03/18 1655   OBSERVATION: Is there reason to believe there has been maltreatment of a vulnerable adult (ie. Physical/Sexual/Emotional abuse, self neglect, lack of adequate food, shelter, medical care, or financial exploitation)?  no 05/03/18 1652    EXPECTED DISCHARGE     OTHER      Expected Discharge Date  05/22/18 05/22/18 1333   Are you depressed or being treated for depression?  No 05/03/18 1654    DISCHARGE PLANNING

## 2018-05-03 NOTE — PLAN OF CARE
Problem: Patient Care Overview  Goal: Plan of Care/Patient Progress Review  Outcome: Adequate for Discharge Date Met: 05/03/18  Speech Language Therapy Discharge Summary    Reason for therapy discharge:    Discharged to acute rehabilitation facility.    Progress towards therapy goal(s). See goals on Care Plan in [facility] electronic health record for goal details.  Goals partially met.  Barriers to achieving goals:   discharge from facility.    Therapy recommendation(s):    Continued therapy is recommended.  Rationale/Recommendations:  Continue ST for swallowing treatment to insure diet tolerance of a DDL 1 with nectar thick liquids, diet advancement, oral/pharyngeal strengthening and complete a speech language evaluation. .

## 2018-05-03 NOTE — IP AVS SNAPSHOT
MRN:[id number]                      After Visit Summary   5/3/2018    Angelito Castellanos    MRN: [id number]           Thank you!     Thank you for choosing [facility] for your care. Our goal is always to provide you with excellent care. Hearing back from our patients is one way we can continue to improve our services. Please take a few minutes to complete the written survey that you may receive in the mail after you visit with us. Thank you!        Patient Information     Date Of Birth          12/23/1925        About your hospital stay     You were admitted on:  May 3, 2018 You last received care in the:   [facility]    You were discharged on:  May 22, 2018        Reason for your hospital stay       Admitted for rehabilitation following hospitalization for stroke.                  Who to Call     For medical emergencies, please call 911.  For non-urgent questions about your medical care, please call your primary care provider or clinic, [phone]          Attending Provider     Provider Specialty    Naima Moya MD Physical Medicine and Rehab       Primary Care Provider Office Phone # Fax #    Gerardo Adhikari [phone]      After Care Instructions     Activity - Up with nursing assistance           Advance Diet as Tolerated       Follow this diet upon discharge: Orders Placed This Encounter      Room Service      Combination Diet Dysphagia Diet Level 2: Mechan Altered; Thin Liquids (water, ice chips, juice, milk gelatin, ice cream, etc)            Bladder scan       X 2 for post void residual            General info for SNF       Length of Stay Estimate: Short Term Care: Estimated # of Days <30  Condition at Discharge: Improving  Level of care:skilled   Rehabilitation Potential: Good  Admission H&P remains valid and up-to-date: Yes  Recent Chemotherapy: N/A  Use Nursing Home Standing Orders: Yes            Mantoux instructions       Give two-step Mantoux (PPD) Per Facility  Policy Yes                  Follow-up Appointments     Follow Up ([facility])       Follow up with primary care provider, Gerardo Adhikari/ ZAHIDA jay, within 7 days follow up hospitalization    Follow up with urology- follow up urinary retention    Follow up with neurology- f/u stroke.     Appointments on [place] and/or [facility] (with [facility] or [facility] provider or service). Call [phone] if you haven't heard regarding these appointments within 7 days of discharge.                  Additional Services     Occupational Therapy Adult Consult       Evaluate and treat as clinically indicated.    Reason:  stroke            Physical Therapy Adult Consult       Evaluate and treat as clinically indicated.    Reason:  stroke            Speech Language Path Adult Consult       Evaluate and treat as clinically indicated.    Reason:  Stroke- impaired cognition, swallow            UROLOGY ADULT REFERRAL       Your provider has referred you to: [facility]: [facility] [place] [phone]   [email or web address]    Please be aware that coverage of these services is subject to the terms and limitations of your health insurance plan.  Call member services at your health plan with any benefit or coverage questions.      Please bring the following with you to your appointment:    (1) Any X-Rays, CTs or MRIs which have been performed.  Contact the facility where they were done to arrange for  prior to your scheduled appointment.    (2) List of current medications  (3) This referral request   (4) Any documents/labs given to you for this referral                  Further instructions from your care team       Follow up:     --TCU provider within 2 weeks    -- Urology- follow up urinary retention    Address            [facility]                           [address]                           [place] [address]  Phone              [phone]  Or  Address  M  "[facility], Urology                          [facility]                          [address]; [address]                          [place] [address]  Phone   [phone]    -- Neurology- follow up stroke with [facility] stroke service- within 8 weeks.   Please call [phone] to schedule your appointment with Neurology.    Address  [facility], Neurology                          [address]; [address]                          [place] [address]  Phone   [phone]    -----------------------------------------------  To reduce the risk of subsequent stroke there are several important factors including optimal management of anticoagulants, blood pressure, cholesterol, diabetes and smoking abstinence.    Anticoagulation:  You are on Apixiban.     Blood Pressure:  Keeping your blood pressures less than 130/80 has been shown to reduce risk of recurrent stroke. You are currently on metoprolol to help control your blood pressure. Several lifestyle modifications have been associated with blood pressure reduction and are an important part of a comprehensive plan. These include: weight loss (if over-weight); a diet low in salt and cholesterol and rich in fruits and vegetables; regular aerobic physical activity and limited alcohol consumption.    Diabetes:  You do not have diabetes though it is important to continue monitoring for this in the future with your primary provider.    Diet:  Currently  you're on a diet modified for your swallow.  Diet can significantly affect your levels and should be part of your plan. Please see additional information from dietitian about this.    Cholesterol:  Traditional target levels for LDL cholesterol or \"bad cholesterol\" is less than 130 however once you have had a stroke, your target LDL level is now less than 70. Additional recommendations such as increasing your HDL or \"good\" cholesterol and lowering your triglyceride level can also be important.    Your most recent " "lipid panel was   Lab Results   Component Value Date    CHOL 174 2018     Lab Results   Component Value Date    HDL 35 2018     Lab Results   Component Value Date     2018     Lab Results   Component Value Date    TRIG 116 2018     This should be followed up in 2-3 months with your primary provider.    Smoking:  Continue to remain tobacco free!      Pending Results     No orders found from 2018 to 2018.            Statement of Approval     Ordered          18 1213  I have reviewed and agree with all the recommendations and orders detailed in this document.  EFFECTIVE NOW     Approved and electronically signed by:  Naima Moya MD             Admission Information     Date & Time Provider Department Dept. Phone    5/3/2018 Naima Moya MD  ACUTE REHAB [phone]      Your Vitals Were     Blood Pressure Pulse Temperature Respirations Height Weight    108/60 (BP Location: Left arm) 69 95.3  F (35.2  C) (Oral) 16 1.798 m (5' 10.8\") 66 kg (145 lb 9.6 oz)    Pulse Oximetry BMI (Body Mass Index)                98% 20.42 kg/m2          [facility] Information     [employer] lets you send messages to your doctor, view your test results, renew your prescriptions, schedule appointments and more. To sign up, go to [email or web address] . Click on \"Log in\" on the left side of the screen, which will take you to the Welcome page. Then click on \"Sign up Now\" on the right side of the page.     You will be asked to enter the access code listed below, as well as some personal information. Please follow the directions to create your username and password.     Your access code is: [email or web address]  Expires: 2018 12:18 PM     Your access code will  in 90 days. If you need help or a new code, please call your [facility] or [phone].        Care EveryWhere ID     This is your Care EveryWhere ID. This could be used by other organizations to access your [facility] medical " records  [id number]        Equal Access to Services     [facility] : Hadii ana lilia reilly rashadjesus manuel Somyaali, wamarcieda luqadaha, qaybta katristasofía zamora, clare peraltaagusdayron mckeon. So [facility] [phone].    ATENCIÓN: Si habla español, tiene a fulton disposición servicios gratuitos de asistencia lingüística. Chrisame al [phone].    We comply with applicable federal civil rights laws and [place] laws. We do not discriminate on the basis of race, color, national origin, age, disability, sex, sexual orientation, or gender identity.               Review of your medicines      START taking        Dose / Directions    apixaban ANTICOAGULANT 5 MG tablet   Commonly known as:  ELIQUIS   Used for:  Atrial fibrillation, unspecified type (H)        Dose:  5 mg   Take 1 tablet (5 mg) by mouth 2 times daily   Refills:  0       finasteride 5 MG tablet   Commonly known as:  PROSCAR   Used for:  Urinary retention with incomplete bladder emptying        Dose:  5 mg   Take 1 tablet (5 mg) by mouth daily   Quantity:  30 tablet   Refills:  0       psyllium Packet   Commonly known as:  METAMUCIL/KONSYL   Used for:  Constipation, unspecified constipation type        Dose:  1 packet   Take 1 packet by mouth daily   Refills:  0       senna-docusate 8.6-50 MG per tablet   Commonly known as:  SENOKOT-S;PERICOLACE   Used for:  Constipation, unspecified constipation type        Dose:  1-2 tablet   Take 1-2 tablets by mouth 2 times daily as needed for constipation   Quantity:  100 tablet   Refills:  0       tamsulosin 0.4 MG capsule   Commonly known as:  FLOMAX   Used for:  Urinary retention with incomplete bladder emptying, Acute embolic stroke (H)        Dose:  0.4 mg   Take 1 capsule (0.4 mg) by mouth every evening   Quantity:  60 capsule   Refills:  0         CONTINUE these medicines which may have CHANGED, or have new prescriptions. If we are uncertain of the size of tablets/capsules you have at home, strength may be listed as  something that might have changed.        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)        Dose:  650 mg   Take 2 tablets (650 mg) by mouth every 6 hours as needed for mild pain   Quantity:  100 tablet   Refills:  0       metoprolol tartrate 25 MG tablet   Commonly known as:  LOPRESSOR   This may have changed:  how much to take   Used for:  Atrial fibrillation, unspecified type (H)        Dose:  12.5 mg   Take 0.5 tablets (12.5 mg) by mouth 2 times daily   Quantity:  60 tablet   Refills:  0         CONTINUE these medicines which have NOT CHANGED        Dose / Directions    atorvastatin 20 MG tablet   Commonly known as:  LIPITOR   Used for:  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 Ask your nurse if you have questions.     You don't need a prescription for these medications     acetaminophen 325 MG tablet    apixaban ANTICOAGULANT 5 MG tablet    finasteride 5 MG tablet    metoprolol tartrate 25 MG tablet    psyllium Packet    senna-docusate 8.6-50 MG per tablet    tamsulosin 0.4 MG capsule                Protect others around you: Learn how to safely use, store and throw away your medicines at www.disposemymeds.org.             Medication List: This is a list of all your medications and  when to take them. Check marks below indicate your daily home schedule. Keep this list as a reference.      Medications           Morning Afternoon Evening Bedtime As Needed    acetaminophen 325 MG tablet   Commonly known as:  TYLENOL   Take 2 tablets (650 mg) by mouth every 6 hours as needed for mild pain   Last time this was given:  650 mg on 5/12/2018  8:12 PM                                apixaban ANTICOAGULANT 5 MG tablet   Commonly known as:  ELIQUIS   Take 1 tablet (5 mg) by mouth 2 times daily   Last time this was given:  5 mg on 5/22/2018  8:55 AM                                atorvastatin 20 MG tablet   Commonly known as:  LIPITOR   Take 1 tablet (20 mg) by mouth daily   Last time this was given:  20 mg on 5/21/2018  8:35 PM                                bisacodyl 10 MG Suppository   Commonly known as:  DULCOLAX   Place 1 suppository (10 mg) rectally daily as needed for constipation   Last time this was given:  10 mg on 5/10/2018 12:59 PM                                finasteride 5 MG tablet   Commonly known as:  PROSCAR   Take 1 tablet (5 mg) by mouth daily   Last time this was given:  5 mg on 5/22/2018  8:59 AM                                metoprolol tartrate 25 MG tablet   Commonly known as:  LOPRESSOR   Take 0.5 tablets (12.5 mg) by mouth 2 times daily   Last time this was given:  12.5 mg on 5/21/2018  8:37 PM                                psyllium Packet   Commonly known as:  METAMUCIL/KONSYL   Take 1 packet by mouth daily   Last time this was given:  1 packet on 5/22/2018  8:56 AM                                senna-docusate 8.6-50 MG per tablet   Commonly known as:  SENOKOT-S;PERICOLACE   Take 1-2 tablets by mouth 2 times daily as needed for constipation   Last time this was given:  1 tablet on 5/16/2018  9:46 PM                                tamsulosin 0.4 MG capsule   Commonly known as:  FLOMAX   Take 1 capsule (0.4 mg) by mouth every evening   Last time this was given:  0.4 mg on  5/21/2018  8:36 PM

## 2018-05-03 NOTE — IP AVS SNAPSHOT
` `     [facility]: [phone]            Medication Administration Report for Angelito Castellanos as of 18 1433   Legend:    Given Hold Not Given Due Canceled Entry Other Actions    Time Time (Time) Time  Time-Action       Inactive    Active    Linked        Medications 18    acetaminophen (TYLENOL) tablet 650 mg  Dose: 650 mg  Freq: EVERY 6 HOURS PRN Route: PO  PRN Reasons: mild pain,fever  Start: 18 1000   Admin Instructions: Maximum acetaminophen dose from all sources = 75 mg/kg/day not to exceed 4 grams/day.    Admin. Amount: 2 tablet (2 × 325 mg tablet)  Dispense Loc: [facility] ADS [facility]NREH[facility]               apixaban ANTICOAGULANT (ELIQUIS) tablet 5 mg  Dose: 5 mg  Freq: 2 TIMES DAILY Route: PO  Start: 18 0900   Admin. Amount: 2 tablet (2 × 2.5 mg tablet)  Last Admin: 18 0855  Dispense Loc: [facility]     1005 (5 mg)-Given       2146 (5 mg)-Given        0852 (5 mg)-Given       2055 (5 mg)-Given        0858 (5 mg)-Given       2041 (5 mg)-Given        0802 (5 mg)-Given       2059 (5 mg)-Given        0846 (5 mg)-Given       2105 (5 mg)-Given        0809 (5 mg)-Given       2036 (5 mg)-Given        0855 (5 mg)-Given       [ ] 2100           atorvastatin (LIPITOR) tablet 20 mg  Dose: 20 mg  Freq: DAILY Route: PO  Start: 18   Admin. Amount: 2 tablet (2 × 10 mg tablet)  Last Admin: 18  Dispense Loc: [facility]     2146 (20 mg)-Given        2055 (20 mg)-Given        2043 (20 mg)-Given        2059 (20 mg)-Given        2105 (20 mg)-Given        2035 (20 mg)-Given        [ ]            barium sulfate 40% (VARIBAR HONEY or NECTAR) oral suspension  Freq: ONCE Route: PO  Start: 05/17/18 1015   Dispense Loc: [facility]  Administrations Remainin  Volume: 250 mL      (1015)-Not Given                bisacodyl (DULCOLAX) Suppository 10 mg  Dose: 10 mg  Freq: DAILY PRN Route: RE  PRN Reason:  constipation  Start: 05/09/18 1323   Admin. Amount: 1 suppository (1 × 10 mg suppository)  Dispense Loc: [facility] ADS 5[facility]RE[facility]               diclofenac (FLECTOR) 1.3 % Patch 1 patch  Dose: 1 patch  Freq: 2 TIMES DAILY PRN Route: TD  PRN Reason: moderate pain  Start: 05/14/18 1000   Admin Instructions: Apply to right shoulder    Admin. Amount: 1 patch  Dispense Loc: [facility]              And  diclofenac (FLECTOR) Patch in Place  Freq: EVERY 8 HOURS Route: TD  Start: 05/14/18 1200   Admin Instructions: Chart every shift, confirming that patch is still in place on patient (no barcode scan needed). See patch order for dose information.    Last Admin: 05/22/18 1211  Dispense Loc: [facility]     0325 ( )-Negative       1119 ( )-Negative               0531 ( )-Negative       1107 ( )-Negative       2046 ( )-Negative        0556 ( )-Negative       1235 ( )-Negative       2046 ( )-Negative        0347 ( )-Negative       1255 ( )-Negative       2100 ( )-Negative        0304 ( )-Negative       1500 ( )-Negative       2106 ( )-Negative        0341 ( )-Negative       1129 ( )-Negative       2037 ( )-Negative        0400 ( )-Negative       1211 ( )-Negative       [ ] 2000          And  diclofenac (FLECTOR) patch REMOVAL  Freq: 2 TIMES DAILY Route: TD  Start: 05/14/18 2100   Admin Instructions: Remove patch when new patch is applied or patch is discontinued.    Last Admin: 05/22/18 0900  Dispense Loc: [facility]     1102 ( )-Negative               0848 ( )-Negative [C]       2046 ( )-Negative        0902 ( )-Negative               0939 ( )-Negative [C]       2100 ( )-Negative [C]        0953 ( )-Negative       2107 ( )-Negative [C]        0821 ( )-Negative       2037 ( )-Negative [C]        0900 ( )-Negative       [ ] 2100           finasteride (PROSCAR) tablet 5 mg  Dose: 5 mg  Freq: DAILY Route: PO  Start: 05/08/18 1830   Admin Instructions: *Do not handle tablets if you are pregnant*    Admin.  Amount: 1 tablet (1 × 5 mg tablet)  Last Admin: 05/22/18 0859  Dispense Loc: [facility]     1005 (5 mg)-Given        0852 (5 mg)-Given        0858 (5 mg)-Given        0802 (5 mg)-Given        0845 (5 mg)-Given        0809 (5 mg)-Given        0859 (5 mg)-Given           lidocaine 2 % (URO-JET) jelly 5 mL  Dose: 5 mL  Freq: CONTINUOUS PRN Route: UR  PRN Reason: other  PRN Comment: pain with cath  Start: 05/03/18 2233   Admin. Amount: 5 mL  Last Admin: 05/15/18 2157  Dispense Loc: [facility] ADS [facility]  Volume: 10 mL               metoprolol tartrate (LOPRESSOR) half-tab 12.5 mg  Dose: 12.5 mg  Freq: 2 TIMES DAILY Route: PO  Start: 05/17/18 2000   Admin Instructions: Hold for sbp <100 or pulse <60    Admin. Amount: 1 half-tab (1 × 12.5 mg half-tab)  Last Admin: 05/21/18 2037  Dispense Loc: [facility] ADS 5[facility]REH[facility]      2055 (12.5 mg)-Given        0858 (12.5 mg)-Given       2044 (12.5 mg)-Given        0753 (12.5 mg)-Given       2059 (12.5 mg)-Given        0846 (12.5 mg)-Given       2104 (12.5 mg)-Given        0809 (12.5 mg)-Given       2037 (12.5 mg)-Given        (0856)-Not Given       [ ] 2000           polyethylene glycol (MIRALAX/GLYCOLAX) Packet 17 g  Dose: 17 g  Freq: DAILY PRN Route: PO  PRN Reason: constipation  Start: 05/03/18 1555   Admin Instructions: Hold for loose stools.  Mix prescribed dose in 8 oz. of water.<br><br>If the patient has multiple PO bowel stimulant agents ordered PRN, offer in the following order per policy.  Move to the next available step if the earlier step is ineffective.<br>Step 1 - senna; Step 2 - bisacodyl; Step 3 - milk of magnesia; Step 4 - polyethylene glycol; Step 5 - magnesium citrate.<br><br>  1 Packet = 17 grams. Mixed prescribed dose in 8 ounces of water. Follow with 8 oz. of water.    Admin. Amount: 17 g  Dispense Loc: [facility] ADS 5[facility]RE[facility]               psyllium (METAMUCIL/KONSYL) Packet 1 packet  Dose: 1 packet  Freq: DAILY Route: PO  Start: 05/07/18 1215   Admin  Instructions: Powder should be diluted with fluid before given.    Admin. Amount: 1 packet  Last Admin: 05/22/18 0856  Dispense Loc: [facility] ADS 5NREH[facility]     1005 (1 packet)-Given        0852 (1 packet)-Given        0858 (1 packet)-Given        0753 (1 packet)-Given        0846 (1 packet)-Given        0809 (1 packet)-Given        0856 (1 packet)-Given           senna-docusate (SENOKOT-S;PERICOLACE) 8.6-50 MG per tablet 1-2 tablet  Dose: 1-2 tablet  Freq: 2 TIMES DAILY PRN Route: PO  PRN Reason: constipation  Start: 05/03/18 1556   Admin Instructions: Hold for loose stools.<br><br>If the patient has multiple PO bowel stimulant agents ordered PRN, offer in the following order per policy.  Move to the next available step if the earlier step is ineffective.<br>Step 1 - senna; Step 2 - bisacodyl; Step 3 - milk of magnesia; Step 4 - polyethylene glycol; Step 5 - magnesium citrate.<br><br>    Admin. Amount: 1-2 tablet  Last Admin: 05/15/18 2056  Dispense Loc: [facility] ADS 5[facility]REH[facility]               tamsulosin (FLOMAX) capsule 0.4 mg  Dose: 0.4 mg  Freq: EVERY EVENING Route: PO  Start: 05/18/18 2100   Admin Instructions: Administer 30 minutes after the same meal each day.  Capsules should be swallowed whole; do not crush chew or open. Hold for sbp <100    Admin. Amount: 1 capsule (1 × 0.4 mg capsule)  Last Admin: 05/21/18 2036  Dispense Loc: [facility] ADS 5[facility]REH[facility]       2044 (0.4 mg)-Given        2059 (0.4 mg)-Given        2105 (0.4 mg)-Given        2036 (0.4 mg)-Given        [ ] 2100        Medications 05/16/18 05/17/18 05/18/18 05/19/18 05/20/18 05/21/18 05/22/18

## 2018-05-04 LAB
ALBUMIN UR-MCNC: 10 MG/DL
APPEARANCE UR: ABNORMAL
BACTERIA #/AREA URNS HPF: ABNORMAL /HPF
BILIRUB UR QL STRIP: NEGATIVE
COLOR UR AUTO: YELLOW
GLUCOSE UR STRIP-MCNC: NEGATIVE MG/DL
HGB UR QL STRIP: ABNORMAL
KETONES UR STRIP-MCNC: NEGATIVE MG/DL
LEUKOCYTE ESTERASE UR QL STRIP: ABNORMAL
MUCOUS THREADS #/AREA URNS LPF: PRESENT /LPF
NITRATE UR QL: NEGATIVE
PH UR STRIP: 6 PH (ref 5–7)
RBC #/AREA URNS AUTO: 171 /HPF (ref 0–2)
SOURCE: ABNORMAL
SP GR UR STRIP: 1.01 (ref 1–1.03)
UROBILINOGEN UR STRIP-MCNC: NORMAL MG/DL (ref 0–2)
WBC #/AREA URNS AUTO: 161 /HPF (ref 0–5)

## 2018-05-04 PROCEDURE — 97530 THERAPEUTIC ACTIVITIES: CPT | Mod: GP | Performed by: STUDENT IN AN ORGANIZED HEALTH CARE EDUCATION/TRAINING PROGRAM

## 2018-05-04 PROCEDURE — 97535 SELF CARE MNGMENT TRAINING: CPT | Mod: GO | Performed by: OCCUPATIONAL THERAPIST

## 2018-05-04 PROCEDURE — 25000132 ZZH RX MED GY IP 250 OP 250 PS 637: Mod: GY | Performed by: PHYSICAL MEDICINE & REHABILITATION

## 2018-05-04 PROCEDURE — 92523 SPEECH SOUND LANG COMPREHEN: CPT | Mod: GN

## 2018-05-04 PROCEDURE — 40000193 ZZH STATISTIC PT WARD VISIT: Performed by: STUDENT IN AN ORGANIZED HEALTH CARE EDUCATION/TRAINING PROGRAM

## 2018-05-04 PROCEDURE — 12800006 ZZH R&B REHAB

## 2018-05-04 PROCEDURE — 97167 OT EVAL HIGH COMPLEX 60 MIN: CPT | Mod: GO | Performed by: OCCUPATIONAL THERAPIST

## 2018-05-04 PROCEDURE — 40000133 ZZH STATISTIC OT WARD VISIT: Performed by: OCCUPATIONAL THERAPIST

## 2018-05-04 PROCEDURE — 97116 GAIT TRAINING THERAPY: CPT | Mod: GP | Performed by: STUDENT IN AN ORGANIZED HEALTH CARE EDUCATION/TRAINING PROGRAM

## 2018-05-04 PROCEDURE — 87086 URINE CULTURE/COLONY COUNT: CPT | Performed by: PHYSICAL MEDICINE & REHABILITATION

## 2018-05-04 PROCEDURE — A9270 NON-COVERED ITEM OR SERVICE: HCPCS | Mod: GY | Performed by: PHYSICAL MEDICINE & REHABILITATION

## 2018-05-04 PROCEDURE — 40000225 ZZH STATISTIC SLP WARD VISIT

## 2018-05-04 PROCEDURE — 97530 THERAPEUTIC ACTIVITIES: CPT | Mod: GO | Performed by: OCCUPATIONAL THERAPIST

## 2018-05-04 PROCEDURE — 25000125 ZZHC RX 250: Performed by: PAIN MEDICINE

## 2018-05-04 PROCEDURE — 81001 URINALYSIS AUTO W/SCOPE: CPT | Performed by: PAIN MEDICINE

## 2018-05-04 PROCEDURE — 97163 PT EVAL HIGH COMPLEX 45 MIN: CPT | Mod: GP | Performed by: STUDENT IN AN ORGANIZED HEALTH CARE EDUCATION/TRAINING PROGRAM

## 2018-05-04 RX ORDER — CIPROFLOXACIN 250 MG/1
250 TABLET, FILM COATED ORAL EVERY 12 HOURS
Status: DISCONTINUED | OUTPATIENT
Start: 2018-05-04 | End: 2018-05-07

## 2018-05-04 RX ADMIN — CIPROFLOXACIN HYDROCHLORIDE 250 MG: 250 TABLET, FILM COATED ORAL at 10:36

## 2018-05-04 RX ADMIN — METOPROLOL TARTRATE 25 MG: 25 TABLET, FILM COATED ORAL at 08:02

## 2018-05-04 RX ADMIN — LIDOCAINE HYDROCHLORIDE 5 ML: 20 JELLY TOPICAL at 05:21

## 2018-05-04 RX ADMIN — CIPROFLOXACIN HYDROCHLORIDE 250 MG: 250 TABLET, FILM COATED ORAL at 20:30

## 2018-05-04 RX ADMIN — ACETAMINOPHEN 650 MG: 325 TABLET ORAL at 13:44

## 2018-05-04 RX ADMIN — ACETAMINOPHEN 650 MG: 325 TABLET ORAL at 08:02

## 2018-05-04 RX ADMIN — ATORVASTATIN CALCIUM 20 MG: 10 TABLET, FILM COATED ORAL at 20:30

## 2018-05-04 RX ADMIN — ASPIRIN 325 MG: 325 TABLET, DELAYED RELEASE ORAL at 08:02

## 2018-05-04 RX ADMIN — LIDOCAINE HYDROCHLORIDE 5 ML: 20 JELLY TOPICAL at 13:46

## 2018-05-04 RX ADMIN — ACETAMINOPHEN 650 MG: 325 TABLET ORAL at 20:30

## 2018-05-04 RX ADMIN — METOPROLOL TARTRATE 25 MG: 25 TABLET, FILM COATED ORAL at 20:30

## 2018-05-04 ASSESSMENT — ACTIVITIES OF DAILY LIVING (ADL): PREVIOUS_RESPONSIBILITIES: MEAL PREP;HOUSEKEEPING;LAUNDRY;SHOPPING;YARDWORK;MEDICATION MANAGEMENT;FINANCES;DRIVING

## 2018-05-04 NOTE — PLAN OF CARE
FOCUS/GOAL  Bladder management, Mobility and Safety management    ASSESSMENT, INTERVENTIONS AND CONTINUING PLAN FOR GOAL:  Alert and oriented x3. Denied c/o pain, shortness of breath, or nausea/vomitting. Min ao1 with walker/gait belt for transfers. Bed and chair alarm on at all times for safety, with no impulsive behaviors notes. UA results received with new orders noted. Q3H toileting offered, pt declined offers and reported he didn't feel like he needed to go. Encouraged him to try again this afternoon, and he was able to void a very small amount. PVR was 320, ISC done with lidocaine gel prior by Luci JUDGE flyer with 250 out @ 1400. Took pills crushed with applesauce, tolerating DD1 diet with nectar-thick liquids well. Will continue to monitor.

## 2018-05-04 NOTE — PROGRESS NOTES
"  [facility]   Acute Rehabilitation Unit  Daily progress note    interval history  Angelito Casetllanos was seen and examined at bedside. Reports he is doing quite well and feeling like he is ready to go home, alert and oriented to self, date, place but limited insight to situation.  He is pleasant and agreeable.  Denies ha, pain, dizziness, n/v/d, sob, or urinary concerns.  Reports he is eating and drinking ok, when asked how drinking is going states \"ok, I've got this\", then drank some of nectar thick liquids on bedside table with a smile.     Functionally, undergoing therapy evals today.       medications    acetaminophen  650 mg Oral TID     aspirin  325 mg Oral Daily     atorvastatin  20 mg Oral Daily     ciprofloxacin  250 mg Oral Q12H     metoprolol tartrate  25 mg Oral BID        bisacodyl, lidocaine 2 %, polyethylene glycol, senna-docusate     physical exam  /70  Pulse 83  Temp 96.6  F (35.9  C) (Oral)  Resp 16  Ht 1.798 m (5' 10.8\")  Wt 71.6 kg (157 lb 12.8 oz)  SpO2 96%  BMI 22.13 kg/m2  Gen: awake alert nad  Cardio: irreg  Pulm: non labored on room air  Abd: soft non tender non distended  Ext: warm dry without edema  Neuro/MSK: alert oriented speech clear.  BLE 4+/5 throughout, LUE 4- L shoulder, ee/ ef , RUE 4/5 throughout    labs  UA reviewed.    Rehabilitation - continue comprehensive acute inpatient rehabilitation program with multidisciplinary approach including therapies, rehab nursing, and physiatry following. See interval history for updates.      assessment and plan    Mr. Angelito Castellanos is a  89 year old yo man with a history of chronic a-fib s/p PPM, BPH, Reflux, and HTN who presented to [facility] 4/29  with slurred speech, left facial droop, and left sided weakness admitted to  ARU 5/3 for ongoing rehabilitation and medical management.       Acute ischemic stroke with large vessel occlusion- presented with left hemiparesis, dysarthria, dysphagia.  CT " Head 4/30: Evolving ischemic infarcts in the right and left middle cerebral artery territories.Diffuse cerebral volume loss and cerebral white matter changes consistent with chronic small vessel ischemic disease  -continue DD1 with nectar  Continue asa- then start apixiban  -continue lipitor 20 mg daily (started by neurology)  -continue PT/OT/SLP  -follow up with neurology ([facility])  - HTN, A-Fib management as below    Chronic A-fib- continue asa 325 then in 2 weeks start apixiban per neurology recs  -continue metoprolol 25 mg bid    HTN- BP stable 130s-150s  -continue metoprolol    UTI- with urinary retention  -bladder program  -continue ciprofloxacin   -follow urine culture-   -cbc, bmp in am in setting of recent stroke with UTI.     Patient  discussed with Dr. Moya  PM&R Staff Physician    Lottie Alarcon PA-C  Rehab Service  Pager: [phone]

## 2018-05-04 NOTE — PLAN OF CARE
Problem: Goal/Outcome  Goal: Goal Outcome Summary  New admit from station 73. Here for rehab status post a stroke, left body involvement. Complained of right shoulder pain, scheduled tylenol and repositioning effective for pain management. dd1 with nectar thick liquids. Meds crushed with apple sauce. Some coughing on the soup ( but daughter  present and noted that he used to cough at home when eating, though he was on regular diet) but notes that the coughing is a little worse since stroke. Appetite is good, independent with eating after set up. Voiding spontaneously but small amounts. Retaining, attempted to straight cath him with a straight tip cath, writer could not advance  catheter and patient noted that it was  uncomfortable. Writer then attempted to use a coude catheter, which after  dvancing some what, patient was groaning and noting that it was too painful. Writer stopped the straight cath. Order for urojet obtained. Another staff did the straight cath, and patient did say that It was much better. Pt is very [place], but does not have hearing aids. Will continue with poc.

## 2018-05-04 NOTE — PROGRESS NOTES
"Discharge Planner PT   Patient plan for discharge: anticipate up to 14 days to meet POC goals  Current status: MIN-MOD A for bed mobility, transfers, gait, completed 4\" steps today with B rails. Needs to progress to 7 standard with single rail, though will see if family can install 2nd railing  Barriers to return to prior living situation: current level of assist, safety awareness, insight into deficits  Recommendations for discharge: likely dc home 24/7 supervision/assist, home PT, FWW  Rationale for recommendations: cognition, pt with baseline balance dificits       Entered by: Ana Justin 05/04/2018 4:53 PM     "

## 2018-05-04 NOTE — PROGRESS NOTES
05/04/18 0927   Quick Adds   Type of Visit Initial PT Evaluation   Living Environment   Lives With child(jeanette), adult   Living Arrangements house  (split level)   Home Accessibility bed and bath are not on the first floor;stairs (1 railing present);stairs to enter home;stairs within home;tub/shower is not walk in   Number of Stairs to Enter Home 1   Number of Stairs Within Home 7   Stair Railings at Home inside, present on right side   Transportation Available car;family or friend will provide   Living Environment Comment Pt was living with daughter in a split level home in [facility] [facility]. Pt reports having bathtub/shower combo. Pt reports laundry is in the mud room on the main floor.    Self-Care   Dominant Hand right   Usual Activity Tolerance good   Current Activity Tolerance fair   Regular Exercise yes   Activity/Exercise Type walking   Exercise Amount/Frequency 3-5 times/wk;1 hr   Equipment Currently Used at Home cane, straight  (uses walking stick when walks in park, no AD in home)   Activity/Exercise/Self-Care Comment no AD in home, pt reports driving to the park and walking for about an hour in good weather- uses walking stick   Functional Level Prior   Ambulation 0-->independent   Transferring 0-->independent   Toileting 0-->independent   Bathing 0-->independent   Dressing 0-->independent   Eating 0-->independent   Communication 0-->understands/communicates without difficulty   Swallowing 0-->swallows foods/liquids without difficulty   Cognition 0 - no cognition issues reported   Fall history within last six months no   Which of the above functional risks had a recent onset or change? ambulation;transferring;toileting;bathing;dressing;swallowing;cognition   Prior Functional Level Comment Pt was IND with ADLs, IADLs, and mobility. Pt reports no falls but has had several LOB's    General Information   Onset of Illness/Injury or Date of Surgery - Date 04/29/18   Referring Physician Dr. Naima Moya   Patient/Family  "Goals Statement \"I want to get out\"   Pertinent History of Current Problem (include personal factors and/or comorbidities that impact the POC) s/p ischemic CVA- right frontal operculum as well as lacunar infarcts within the left basal ganglia. 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 PMH includes diet controlled DM, a-fib   Precautions/Limitations fall precautions;swallowing precautions  (currently DD1+nectar)   Heart Disease Risk Factors Age;Gender;Medical history;Diabetes   General Observations pt supine in bed, pleasant and agreeable to PT eval   Cognitive Status Examination   Orientation orientation to person, place and time   Level of Consciousness alert   Follows Commands and Answers Questions 75% of the time   Personal Safety and Judgment impaired;impulsive   Memory impaired   Cognitive Comment was slow to be able to recall season, had difficulty recalling home set up   Pain Assessment   Patient Currently in Pain No   Posture    Posture Kyphosis;Protracted shoulders;Forward head position   Range of Motion (ROM)   ROM Comment WFL, though tight hips and HS B'ly, dififcult to assess DF as pt not following commands   Strength   Strength Comments LLE/UE ~4-/5   ARC Assessment Only   Acute Rehab FIM See FIM scores for Mobility/ADL Assessment   Balance   Balance Comments able to stand perform bimanual task briefly, impaired sitting balance with posterior and R lateral LOB intermittently   Coordination   Coordination Comments B hands impaired, L> R, BLE impaired L>R   Muscle Tone   Muscle Tone no deficits were identified   General Therapy Interventions   Planned Therapy Interventions ADL retraining;balance training;bed mobility training;gait training;groups;motor coordination training;neuromuscular " re-education;strengthening;stretching;transfer training;risk factor education;home program guidelines;progressive activity/exercise   Clinical Impression   Criteria for Skilled Therapeutic Intervention yes, treatment indicated   PT Diagnosis impaired functional mobility   Influenced by the following impairments balance, strength, cognition, activity tolerance   Functional limitations due to impairments independence with functional mobility, transfers, gait, stairs, household and community mobility, ability to drive   Clinical Presentation Evolving/Changing   Clinical Presentation Rationale pt w/ > 4 personal factors limiting independence with functional mobility   Clinical Decision Making (Complexity) High complexity   Therapy Frequency` other (see comments)  (60-75 minutes daily)   Predicted Duration of Therapy Intervention (days/wks) 12-14 days   Anticipated Equipment Needs at Discharge gait belt;front wheeled walker   Anticipated Discharge Disposition Home with Assist;Home with Home Therapy   Risk & Benefits of therapy have been explained Yes   Patient, Family & other staff in agreement with plan of care Yes   Clinical Impression Comments please see care plan note   Total Evaluation Time   Total Evaluation Time (Minutes) 30

## 2018-05-04 NOTE — PLAN OF CARE
FOCUS/GOAL  Bowel management, Bladder management, Pain management, Skin integrity and Cognition/Memory/Judgment/Problem solving    ASSESSMENT, INTERVENTIONS AND CONTINUING PLAN FOR GOAL:  Pt is alert and oriented, continent using urinal at bedside with assistance, denied SOB, pain, dizziness, or new changes in sensation, pt reported being unable to sleep and requested Xanax prn at 1 am, NS running in to PIV on left forearm at 100cc/hr continuously, pt slept following xanax, no further care concerns at this time continue to monitor.

## 2018-05-04 NOTE — PROVIDER NOTIFICATION
"Social Work: Initial Assessment with Discharge Plan    Patient Name: Angelito Castellanos  : 1925  Age: 89 year old  MRN: [id number]  Completed assessment with: Pt/daughterIzaiah  Admitted to ARU: 5/3/18     Presenting Information   Date of SW assessment: May 4, 2018  Health Care Directive: None filed.   Primary Health Care Agent: Would default to pt's daughters, Shae & Ethan, per  NO policy.   Secondary Health Care Agent: NA   Living Situation: House with daughterShae.   Previous Functional Status: Independent. Pt's daughter Ethan stated that pt had been doing things on his own, including driving. She noted that he didn't use the stove as he himself worried about forgetting about it, so would cook mainly using the microwave. He was riding a bike up until this fall. Family was discussing how to talk to him about not driving anymore just prior to hospitalization.  DME available: Therapies to assess.   Patient and family understanding of hospitalization: Leanne Long seemed to have a good understanding of rehab hospitalization and is considering different d/c needs.    Cultural/Language/Spiritual Considerations: English-speaking       Physical Health  Reason for admission: No diagnosis found.    Provider Information   Primary Care Physician:Gerardo Adhikari. Holly Long thought this provider was at [facility] and stated that pt also sees a cardiologist there.   : None identified.     Mental Health/Chemical Dependency:   Diagnosis: Fco reported that pt saw a grief counselor 1x time after his wife  a few years ago and then said \"that was good enough\" and didn't return. Ethan thought that pt had some mild depression, but didn't have any concerns about his function.   Alcohol/Tobacco/Narcotis: Fco reported that pt does drink, but denied concerns about it being an issue. She did note that when daughter-Shae goes out of town (the daughter pt lives with), she marks " "the level of alcohol bottles in the house, just to monitor how much he drinks. She reported that pt does not know this.   Support/Services in Place: None.  Services Needed/Recommended: None at this time.   Sexuality/Intimacy: Did not address.     Support System  Marital Status: Pt is .   Family support: Pt has two adult daughters (Ethan and Shae) and three adult grandchildren. Both daughters are involved and supportive. Ethan is retired and Shae works full-time.    Other support available: Fco reported that she and Shae each have \"exes\" that are supportive and involved to some degree with pt. One of them came to the hospital the first night pt was here.    Community Resources  Current in home services: None. Pt's daughter reported that pt is a vet and that Rosangela is [facility]. Discussed benefits of pursuing this for short-term and long-term.   Previous services: None     Financial/Employment/Education  Employment Status: Retired. Pt used to be a [employer]  and at .   Income Source: Unknown   Education: Unknown   Financial Concerns:  None identified.   Insurance: Medicare, [employer]     Discharge Plan   Patient and family discharge goal: Return home with family support.   Provided Education on discharge plan: Yes, discussed different potential d/c needs (i.e., home care vs outpt therapies). Also discussed possible resources for down the road (i.e., assisted living, [facility] resources for hospice, etc.). Final d/c plan TBD.  Patient agreeable to discharge plan:  Final plan TBD.   Provided education and attained signature for Medicare IM and IRF Patient Rights and Privacy Information provided to patient : Yes, pt signed. Sent copy to pt's chart.   Provided patient with [place] Resources: Discussed resource with pt's daughter, Ethan. She took information for her and her sister to look into.   Barriers to discharge: " Progression with therapies.     Discharge Recommendations   Disposition: Home with continued family support + recommended services.   Transportation Needs: Family   Name of Transportation Company and Phone: NA     Additional comments   Attempted to meet with pt/family at the bedside between therapies, but pt then needed to be cathed, so was agreeable to SW meeting with his daughter in family lounge. Later met again with pt to complete BIMS and sign Medicare letter. Obtained assessment info from daughter. Discussed possible needs immediately at d/c, as well as for down the road, should pt end up needing more care. Pt's daughter, Shae, is looking into [facility], for which ARAVIND provided positive feedback for being proactive about this. Pt/family goal is for pt to return home with continued family support. Team working towards a safe d/c plan.    Please invite to Care Conference:  DaughterShae: [phone]  DaughterEthan: [phone]     05/04/18 1500   Living Arrangements   Lives With child(jeanette), adult   Living Arrangements house   Discharge Planning   Anticipated Discharge Disposition home with assist   Discharge Needs Assessment   Transportation Available car;family or friend will provide     DEBBY Navarro, ARAVIND  Float  - Coverage for [facility]  Pager: [phone]  [email or web address]     NO LETTER

## 2018-05-04 NOTE — PROGRESS NOTES
05/04/18 0815   Quick Adds   Type of Visit Initial Occupational Therapy Evaluation   Living Environment   Lives With child(jeanette), adult   Living Arrangements house  (split level)   Home Accessibility bed and bath are not on the first floor;stairs (1 railing present);stairs to enter home;stairs within home;tub/shower is not walk in   Number of Stairs to Enter Home 1   Number of Stairs Within Home 7   Stair Railings at Home inside, present on right side   Transportation Available car;family or friend will provide   Living Environment Comment OT: Pt was living with daughter in a split level home in [facility]. Pt reports having bathtub/shower combo. Pt reports laundry is in the mud room on the main floor.    Self-Care   Dominant Hand right   Usual Activity Tolerance good   Current Activity Tolerance fair   Regular Exercise yes   Activity/Exercise Type walking   Exercise Amount/Frequency 3-5 times/wk   Equipment Currently Used at Home cane, straight;shower chair   Activity/Exercise/Self-Care Comment OT: Pt reports walking to the park frequently when the weather is nice, but limited exercise during the winter months.    Functional Level Prior   Ambulation 0-->independent   Transferring 0-->independent   Toileting 0-->independent   Bathing 0-->independent   Dressing 0-->independent   Eating 0-->independent   Communication 0-->understands/communicates without difficulty   Swallowing 0-->swallows foods/liquids without difficulty   Cognition 0 - no cognition issues reported   Fall history within last six months no   Which of the above functional risks had a recent onset or change? ambulation;transferring;toileting;bathing;dressing;swallowing;cognition   Prior Functional Level Comment OT: Pt was IND with ADLs, IADLs, and mobility. Pt reports no falls but has had several LOB's    General Information   Onset of Illness/Injury or Date of Surgery - Date 04/29/18   Referring Physician Dr. Graves    Patient/Family Goals Statement OT:  "\"To be able to walk straight again.\"   Additional Occupational Profile Info/Pertinent History of Current Problem OT: Per chart review, pt is a 89 year old male with past medical history significant for chronic atrial fibrillation not on anticoagulation, diabetes that is diet-controlled who is being admitted to the ARU on 05/03/18. He initially presented to [facility] on 4- with slurred speech, left facial droop and left-sided weakness. He was admitted, initial CT scan showed stroke involving the right frontal operculum as well as lacunar infarcts within the left basal ganglia of unclear chronicity. 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 He was seen by neurology but felt not to be a candidate for thrombectomy. Patient has a history of refusing medications in the past, having had refused anticoagulation for the atrial fibrillation and medications for the diabetes. He does have history of right rotator cuff syndrome which is chronic, however with the overuse given the new left-sided weakness he has had increased pain in his right shoulder.   Precautions/Limitations fall precautions;swallowing precautions  (nectar thick liquids, Left side weakness)   Heart Disease Risk Factors Diabetes;High blood pressure;Family history;Medical history;Gender;Age   General Observations OT: Pt has hx of rotator cuff and reports pain with movement.    Cognitive Status Examination   Orientation person  (pt req'd vc's for date, day of the week, and city)   Level of Consciousness alert   Personal Safety (Cognitive) decreased insight to deficits;impulsive   Memory impaired   Attention Distractible during evaluation   Cognitive Comment OT: Pt demonstrated impulsivity during evaluation with attempting to stand and unaware " of balance deficits. Pt stated during evaluation that he wanted to go driving and unaware of cognitive and balance deficits following stroke. Pt unable to describe home environment when writer was requesting information on home set-up.    Visual Perception   Visual Perception Comments OT: Wears reading glasses.    Pain Assessment   Patient Currently in Pain No  (experiences pain in Right shoulder during mvmt)   Integumentary/Edema   Integumentary/Edema no deficits were identifed   Posture   Posture forward head position;protracted shoulders;kyphosis   Range of Motion (ROM)   ROM Comment OT: WFL with BUE's all planes   Strength   Strength Comments OT: RUE: 4 with sh. flex and abd, 4- elb. flex/ext and  strength. LUE: 4- with sh. flex and abd, 3+ with elb. flex/ext, and  strength.    Hand Strength   Left hand  (pounds) 19 pounds   Right hand  (pounds) 31 pounds   Coordination   Left hand, nine hole peg test (seconds) 1 minute 25 seconds   Right hand, nine hole peg test (seconds) 40 seconds   Functional Limitations Decreased speed;Fine motor ADL performance impaired;Object transport impaired   ARC Assessment Only   Acute Rehab FIM See FIM scores for Mobility/ADL Assessment   Instrumental Activities of Daily Living (IADL)   Previous Responsibilities meal prep;housekeeping;laundry;shopping;yardwork;medication management;finances;driving   Activities of Daily Living Analysis   Impairments Contributing to Impaired Activities of Daily Living balance impaired;cognition impaired;coordination impaired;motor control impaired;postural control impaired;strength decreased   General Therapy Interventions   Planned Therapy Interventions ADL retraining;IADL retraining;balance training;bed mobility training;cognition;fine motor coordination training;groups;motor coordination training;neuromuscular re-education;strengthening;transfer training;visual perception;home program guidelines;progressive activity/exercise;risk  factor education   Clinical Impression   Criteria for Skilled Therapeutic Interventions Met yes, treatment indicated   OT Diagnosis OT: Decreased safety and IND with ADLs and IADLs.    Influenced by the following impairments OT: Impaired cognition, weakness, impaired strength, poor safety awareness, impaired balance   Assessment of Occupational Performance 5 or more Performance Deficits   Identified Performance Deficits OT: Performance deficits include: dressing, toileting, bathing, G/H tasks, and all IADLs.    Clinical Decision Making (Complexity) High complexity   Therapy Frequency daily   Predicted Duration of Therapy Intervention (days/wks) 2 weeks   Anticipated Equipment Needs at Discharge bathing equipment;dressing equipment;toileting equipment;tub bench   Anticipated Discharge Disposition Home with Assist;Home with Home Therapy   Risks and Benefits of Treatment have been explained. Yes   Patient, Family & other staff in agreement with plan of care Yes   Clinical Impression Comments OT: Pt would benefit from skilled OT services d/t recent decline in safety and IND with ADLs, IADLs, and functional mobility.     Total Evaluation Time   Total Evaluation Time (Minutes) 25

## 2018-05-04 NOTE — PLAN OF CARE
Problem: Patient Care Overview  Goal: Plan of Care/Patient Progress Review  OT eval completed and tx initiated. Per pt and dtr, pt was IND with ADLs, IADLs, and mobility prior. However, pt and dtr reporting pt had difficulty with getting in/out of bed, STS's, and shuffled feet with ambulation. Pt currently requires mod A with UBD seated at EOB with CGA/min A for sitting balance, and max A with LBD in supine d/t poor sitting balance. Pt req'd min A with toilet transfers with FWW. Pt requires min A with G/H tasks standing at EOS. Pt demonstrating limited insight into deficits, poor safety awareness, impulsivity, and difficulty with recall.   Anticipate LOS: ~2 weeks with pt D/C'ing home with assistance and HC services.  Pt will likely need extended tub bench, grab bars, RTS or commode overlay, and AE for LBD tasks.

## 2018-05-04 NOTE — PROGRESS NOTES
05/04/18 1600   General Information, SLP   Type of Evaluation Speech and Language;Cognitive-Linguistic   Type of Visit Initial   Start of Care Date 05/04/18   Onset of Illness/Injury or Date of Surgery - Date 04/29/18   Referring Physician Dr. Graves   Patient/Family Goals Statement Get back home and be independent   Pertinent History of Current Problem He initially presented to [facility] on 4- with slurred speech, left facial droop and left-sided weakness. He was admitted, initial CT scan showed stroke involving the right frontal operculum as well as lacunar infarcts within the left basal ganglia of unclear chronicity. 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   General Info Comments Patient was seen during acute care hospitalization for dysphagia management.  They recommended ongoing therapy to address dysphagia as well as to assess cognitive deficits being noted   Speech   Speech Comments No impairments noted.   Language: Auditory Comprehension (understanding of spoken language)   Two Step Commands (out of 5 total) 5  (Perseveration of eye closing noted)   Commands; [place] Diagnostic Aphasia Exam 3 (out of 15 total) 13   Paragraph; Discourse Comprehension Test (out of 8 total; less than 7 is below mean) 4   Functional Assessment Scale (Auditory Comprehension) Mild Impairment   Comments (Auditory Comprehension) Function is exacerbated by hearing status.  Patient does have a pocket talker at home but no hearing aids.  During evaluation this date, patient did have a pocket talker on.   Language: Verbal Expression (use of spoken language to express information)   Define Words; [place] Test for Differential Diagnosis Of Aphasia (out of 10 total) 8   Generative Naming Score; Cognitive  Linguistic Quick Test 3   Generative Naming; Cognitive Linguistic Quick Test Result Below mean  (Standard scores are up to age 89)   Functional Assessment Scale (Verbal Expression) Mild Impairment   Comments (Verbal Expression) Occasional word finding difficulties noted with patient repeating the same error multiple times before he was able to correct it.  Patient did recognize that he was making an error in the word he was saying.   Reading Comprehension (understanding of written language)   Sentences and Paragraphs; [place] Diagnostic Aphasia Exam (out of 10 total) 9   Functional Assessment Scale (Reading Comprehension) Minimal Impairment   Comments (Reading Comprehension) Completed task in appropriate amount of time.   Written Expression (use of writing to express information)   Comments (Written Expression) Not assessed due to time limitations.   Cognitive Status Examination   Attention impaired   Short Term Memory impaired   Reasoning impaired   Executive Function Deficits Noted insight/awareness   Additional cognitive-linguistic evaluation indicated  yes;Iggy-Kyler   Standardized cognitive-linguistic assessment completed to be completed during future session   Cognitive Status Exam Comments Due to age, patient not appropriate for all standardized measures   Clinical Impression, SLP Eval   Criteria for Skilled Therapeutic Interventions Met Yes   SLP Diagnosis Moderate cognitive linguistic impairments.   Influenced by the following factors/impairments Deficits in awareness/insight and short-term memory   Functional limitations due to impairments Increased level of assistance   Rehab Potential Good, to achieve stated therapy goals   Rehab potential affected by Recent onset, family support   Therapy Frequency Daily   Predicted Duration of Therapy Intervention (days/wks) 2 weeks   Anticipated Discharge Disposition Home with Assist   Risks and Benefits of Treatment have been explained. Yes   Patient, Family &  other staff in agreement with plan of care Yes   Clinical Impression Comments Patient presenting with mild language impairments.  Patient language is functional for needs.  Cognitively, patient demonstrating impairments in his awareness and insight into current status and limitations.  Demonstrating some impulsivity with self transfers and difficulties with short-term memory.  Further evaluation of cognition is very appropriate at this time and further goals to be added as needed.  Current level of function is below baseline and patient would benefit from ongoing intervention to maximize level of independence and safety for return to home.   Total Evaluation Time      Total Evaluation Time (Minutes) 45

## 2018-05-05 LAB
ANION GAP SERPL CALCULATED.3IONS-SCNC: 10 MMOL/L (ref 3–14)
BACTERIA SPEC CULT: NORMAL
BUN SERPL-MCNC: 23 MG/DL (ref 7–30)
CALCIUM SERPL-MCNC: 8.5 MG/DL (ref 8.5–10.1)
CHLORIDE SERPL-SCNC: 110 MMOL/L (ref 94–109)
CO2 SERPL-SCNC: 24 MMOL/L (ref 20–32)
CREAT SERPL-MCNC: 0.89 MG/DL (ref 0.66–1.25)
ERYTHROCYTE [DISTWIDTH] IN BLOOD BY AUTOMATED COUNT: 13.9 % (ref 10–15)
GFR SERPL CREATININE-BSD FRML MDRD: 80 ML/MIN/1.7M2
GLUCOSE SERPL-MCNC: 107 MG/DL (ref 70–99)
HCT VFR BLD AUTO: 39.3 % (ref 40–53)
HGB BLD-MCNC: 13.4 G/DL (ref 13.3–17.7)
Lab: NORMAL
MCH RBC QN AUTO: 30.5 PG (ref 26.5–33)
MCHC RBC AUTO-ENTMCNC: 34.1 G/DL (ref 31.5–36.5)
MCV RBC AUTO: 90 FL (ref 78–100)
PLATELET # BLD AUTO: 159 10E9/L (ref 150–450)
POTASSIUM SERPL-SCNC: 3.8 MMOL/L (ref 3.4–5.3)
RBC # BLD AUTO: 4.39 10E12/L (ref 4.4–5.9)
SODIUM SERPL-SCNC: 144 MMOL/L (ref 133–144)
SPECIMEN SOURCE: NORMAL
WBC # BLD AUTO: 6.8 10E9/L (ref 4–11)

## 2018-05-05 PROCEDURE — 40000193 ZZH STATISTIC PT WARD VISIT: Performed by: PHYSICAL THERAPIST

## 2018-05-05 PROCEDURE — 97535 SELF CARE MNGMENT TRAINING: CPT | Mod: GO | Performed by: OCCUPATIONAL THERAPIST

## 2018-05-05 PROCEDURE — 80048 BASIC METABOLIC PNL TOTAL CA: CPT | Performed by: PHYSICIAN ASSISTANT

## 2018-05-05 PROCEDURE — A9270 NON-COVERED ITEM OR SERVICE: HCPCS | Mod: GY | Performed by: PHYSICAL MEDICINE & REHABILITATION

## 2018-05-05 PROCEDURE — 12800006 ZZH R&B REHAB

## 2018-05-05 PROCEDURE — 92610 EVALUATE SWALLOWING FUNCTION: CPT | Mod: GN | Performed by: SPEECH-LANGUAGE PATHOLOGIST

## 2018-05-05 PROCEDURE — 85027 COMPLETE CBC AUTOMATED: CPT | Performed by: PHYSICIAN ASSISTANT

## 2018-05-05 PROCEDURE — 97750 PHYSICAL PERFORMANCE TEST: CPT | Mod: GP | Performed by: PHYSICAL THERAPIST

## 2018-05-05 PROCEDURE — 40000225 ZZH STATISTIC SLP WARD VISIT: Performed by: SPEECH-LANGUAGE PATHOLOGIST

## 2018-05-05 PROCEDURE — 25000132 ZZH RX MED GY IP 250 OP 250 PS 637: Mod: GY | Performed by: PHYSICAL MEDICINE & REHABILITATION

## 2018-05-05 PROCEDURE — 36415 COLL VENOUS BLD VENIPUNCTURE: CPT | Performed by: PHYSICIAN ASSISTANT

## 2018-05-05 PROCEDURE — 97112 NEUROMUSCULAR REEDUCATION: CPT | Mod: GP | Performed by: PHYSICAL THERAPIST

## 2018-05-05 PROCEDURE — 40000133 ZZH STATISTIC OT WARD VISIT: Performed by: OCCUPATIONAL THERAPIST

## 2018-05-05 PROCEDURE — 97116 GAIT TRAINING THERAPY: CPT | Mod: GP | Performed by: PHYSICAL THERAPIST

## 2018-05-05 PROCEDURE — 97127 ZZHC SP THERAPEUTIC INTERVENTION W/FOCUS ON COGNITIVE FUNCTION,EA 15 MIN: CPT | Mod: GN | Performed by: SPEECH-LANGUAGE PATHOLOGIST

## 2018-05-05 PROCEDURE — 25000125 ZZHC RX 250: Performed by: PAIN MEDICINE

## 2018-05-05 RX ORDER — TAMSULOSIN HYDROCHLORIDE 0.4 MG/1
0.4 CAPSULE ORAL EVERY EVENING
Status: DISCONTINUED | OUTPATIENT
Start: 2018-05-05 | End: 2018-05-05

## 2018-05-05 RX ADMIN — CIPROFLOXACIN HYDROCHLORIDE 250 MG: 250 TABLET, FILM COATED ORAL at 20:02

## 2018-05-05 RX ADMIN — ASPIRIN 325 MG: 325 TABLET, DELAYED RELEASE ORAL at 08:06

## 2018-05-05 RX ADMIN — ACETAMINOPHEN 650 MG: 325 TABLET ORAL at 08:06

## 2018-05-05 RX ADMIN — TAMSULOSIN HYDROCHLORIDE 0.4 MG: 0.4 CAPSULE ORAL at 21:32

## 2018-05-05 RX ADMIN — LIDOCAINE HYDROCHLORIDE 5 ML: 20 JELLY TOPICAL at 14:15

## 2018-05-05 RX ADMIN — ACETAMINOPHEN 650 MG: 325 TABLET ORAL at 13:42

## 2018-05-05 RX ADMIN — LIDOCAINE HYDROCHLORIDE 5 ML: 20 JELLY TOPICAL at 02:29

## 2018-05-05 RX ADMIN — CIPROFLOXACIN HYDROCHLORIDE 250 MG: 250 TABLET, FILM COATED ORAL at 08:06

## 2018-05-05 RX ADMIN — ACETAMINOPHEN 650 MG: 325 TABLET ORAL at 20:02

## 2018-05-05 RX ADMIN — METOPROLOL TARTRATE 25 MG: 25 TABLET, FILM COATED ORAL at 20:02

## 2018-05-05 RX ADMIN — METOPROLOL TARTRATE 25 MG: 25 TABLET, FILM COATED ORAL at 08:06

## 2018-05-05 RX ADMIN — ATORVASTATIN CALCIUM 20 MG: 10 TABLET, FILM COATED ORAL at 20:02

## 2018-05-05 RX ADMIN — LIDOCAINE HYDROCHLORIDE 5 ML: 20 JELLY TOPICAL at 09:49

## 2018-05-05 RX ADMIN — LIDOCAINE HYDROCHLORIDE 5 ML: 20 JELLY TOPICAL at 13:42

## 2018-05-05 NOTE — PLAN OF CARE
Problem: Patient Care Overview  Goal: Plan of Care/Patient Progress Review  Outcome: Therapy, progress toward functional goals as expected  OT:  Full shower assessment completed today with extended tub bench and grab bar.  Patient requiring mod A overall for bathing process for thoroughness and stability in standing with grab bar.  Continue per POC.      Sailaja CASTLE/JAMIL

## 2018-05-05 NOTE — PLAN OF CARE
Problem: Patient Care Overview  Goal: Plan of Care/Patient Progress Review  Completed clinical bedside swallow eval per MD orders. Pt presents with moderate oral pharyngeal dysphagia as evidence by today's eval as well as recent VFSS that was completed on 5/2/18 ( showed laryngeal penetration with thin liquids and increased pharygneal retetnion with semi-solids. Pt demosntrates positve aspiration s/s 1x out of 3 iwith ice chip trails and 1x out of 3 with thin liquids by teaspoon. Swallow response is mildly delayed. Thre were no s/s of aspiration with nectar thick liquids by cup rim. No difficulty with pureed solids but with semi-solids-noting slower and discorrdinated oral prep -- may be more due to missing numerous teeth upper and lower. Recommending continue with current DD1 with nectar thick liquids. Pt needs to be upright for all po, take small bite/sips, alternate solids and liquids, pace self; eat slowly. SLP to f/u for diet tolerance and further assessement for readiness to advance diet as pt's diet is currently below baseline.     Compelted further cognitive assessment as t: moderate deficits in short term memory for lengthier info; mild-moderate impairments in reasoning and attention

## 2018-05-05 NOTE — PLAN OF CARE
Problem: Goal/Outcome  Goal: Goal Outcome Summary  Outcome: Improving  Focus: Patient Care.  D: Patient disoriented to times and place; VS with low temp; LS clear and BS+; denies pain.  I: PO fluids encouraged and bladder scanned; attempted straight cath with resistance; charge nurse notified of no urine output for MD notification; up with assist.  A: Sat up in chair for dinner and grand-son at bedside; bed and chair alarm maintained; tolerated PO intake and meds.  P: Continue to monitor I&O and vital signs.

## 2018-05-05 NOTE — PROGRESS NOTES
05/05/18 1300   General Information   Onset Date 04/29/18   Start of Care Date 05/04/18   Referring Physician Dr Star MD   Comments Angelito Castellanos is a 89 year old male with past medical history significant for chronic atrial fibrillation not on anticoagulation, diabetes that is diet-c.He initially presented to [facility] on 4- with slurred speech, left facial droop and left-sided weakness.- reight frontal stroke as well as lacunar infarcts   Clinical Swallow Evaluation   Oral Musculature generally intact   Structural Abnormalities none present   Dentition other (see comments)  (missing numerous upper and lower teeth)   Mucosal Quality good   Mandibular Strength and Mobility intact   Oral Labial Strength and Mobility impaired coordination   Lingual Strength and Mobility impaired coordination   Velar Elevation intact   Buccal Strength and Mobility intact   Laryngeal Function Cough;Throat clear;Swallow;Voicing initiated;Dry swallow palpated   Additional Documentation Yes   Additional evaluation(s) completed today No   Swallow Eval   Feeding Assistance set up only required   Clinical Swallow Eval: Thin Liquid Texture Trial   Mode of Presentation, Thin Liquids spoon;fed by clinician   Volume of Liquid or Food Presented ice chips x3; teaspoon amts x3   Oral Phase of Swallow other (see comments)  (mild delayed swallow initiation)   Pharyngeal Phase of Swallow impaired;coughing/choking   Diagnostic Statement pt had a cough response 1x out of 3 with ice chips and wet vocal quality 1x out of 3 trials by teaspoon; milid delayed swallow initiation   Clinical Swallow Eval: Nectar Thick Liquid Texture Trial   Mode of Presentation, Nectar cup;self-fed   Volume of Nectar Presented 3 oz   Oral Phase, [place] other (see comments)  (mild delayed swallow initiation)   Pharyngeal Phase, [place] intact   Diagnostic Statement No s/s of aspiration with nectar thick liquids; mild delayed swallow initiatiion    Clinical Swallow Eval: Puree Solid Texture Trial   Mode of Presentation, Puree spoon;self-fed   Volume of Puree Presented 6 oz   Oral Phase, Puree WFL   Pharyngeal Phase, Puree intact   Successful Strategies Trialed During Procedure, Puree hard swallow   Diagnostic Statement No aspiration s/s and no sensation fo pharyngeal retention   Clinical Swallow Eval: Semisolid Texture Trial   Mode of Presentation, Semisolid spoon;fed by clinician   Volume of Semisolid Food Presented small bites of shirley- solids   Oral Phase, Semisolid other (see comments)  (mildly slwoer oral prep)   Pharyngeal Phase, Semisolid intact   Successful Strategies Trialed During Procedure, Semisolid hard swallow   Diagnostic Statement No aspiration s/s and no sensation of pharyngeal retention but on recent VFSs 5/1/18- pt did have moderate amount sof vallecular residue with semi-solid consistency; mildly slower and discoordinated oral prep --ikely more 2/2 missing numerous upper and lower teeth   VFSS Evaluation   VFSS Additional Documentation No   FEES Evaluation   Additional Documentation No   Swallow Compensations   Swallow Compensations Alternate viscosity of consistencies;Effortful swallow;Pacing;Reduce amounts;Multiple swallow   Results Suspect aspiration   Esophageal Phase of Swallow   Patient reports or presents with symptoms of esophageal dysphagia Yes   Esophageal comments hx of acid reflux   General Therapy Interventions   Planned Therapy Interventions Dysphagia Treatment   Dysphagia treatment Oropharyngeal exercise training;Modified diet education;Instruction of safe swallow strategies   Swallow Eval: Clinical Impressions   Skilled Criteria for Therapy Intervention Skilled criteria met.  Treatment indicated.   Functional Assessment Scale (FAS) 3   Dysphagia Outcome Severity Scale (FELICITAS) Level 3 - FELICITAS   Treatment Diagnosis Moderate oral pharyngeal dysphagia   Diet texture recommendations Full liquid;Nectar thick liquids   Recommended  Feeding/Eating Techniques alternate between small bites and sips of food/liquid;hard swallow w/ each bite or sip;maintain upright posture during/after eating for 30 mins;small sips/bites   Demonstrates Need for Referral to Another Service occupational therapy;physical therapy   Therapy Frequency daily   Predicted Duration of Therapy Intervention (days/wks) 2 weeks   Anticipated Discharge Disposition home w/ outpatient services   Risks and Benefits of Treatment have been explained. Yes   Patient, family and/or staff in agreement with Plan of Care Yes   Clinical Impression Comments Completed clinical bedside swallow eval per MD orders. Pt presents with moderate oral pharyngeal dysphagia as evidence by today's eval as well as recent VFSS that was completed on 5/2/18 ( showed laryngeal penetration with thin liquids and increased pharygneal retetnion with semi-solids. Pt demosntrates positve aspiration s/s 1x out of 3 iwith ice chip trails and 1x out of 3 with thin liquids by teaspoon. Swallow response is mildly delayed. Thre were no s/s of aspiration with nectar thick liquids by cup rim. No difficulty with pureed solids but with semi-solids-noting slower and discorrdinated oral prep -- may be more due to missing numerous teeth upper and lower. Recommending continue with current DD1 with nectar thick liquids. Pt needs to be upright for all po, take small bite/sips, alternate solids and liquids, pace self0 eat slowly. SLP to f/u for diet tolerance and further assessement for readiness to advance diet as pt's diet is currently below basekine.    Total Evaluation Time   Total Evaluation Time (Minutes) 30

## 2018-05-05 NOTE — PLAN OF CARE
FOCUS/GOAL  Bowel management, Bladder management, Skin integrity and Cognition/Memory/Judgment/Problem solving    ASSESSMENT, INTERVENTIONS AND CONTINUING PLAN FOR GOAL:  Pt is alert and disoriented to place and situation, could not void during the night, BS of 448 followed by SC of 500 ml  Out of blood, clots, and dark fco color urine, used 14 coude with urojet, pt slept the majority of the night, redness on intergluteal cleft, pt is repositioning independently, no further care concerns at this time continue to motivate to drink fluids.

## 2018-05-05 NOTE — PLAN OF CARE
Problem: Goal/Outcome  Goal: Goal Outcome Summary  Outcome: Therapy, progress toward functional goals as expected  PT: pt very [place] but able without pocket talker if you have a deep loud voice and stay near his head.  Follows all instructions and implements quickly.  Notes left foot drag during allmobility will place pt at risk of fall.  Many family present and attentive to instructions and answered/asked many questions.  Pt with alarms on as he does like to be independent.  Agreeable to being patient and working hard during his stay.  A delight.

## 2018-05-05 NOTE — PROGRESS NOTES
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 On dysphagia diet now, fatigued. He was surrounded by several family members today, including a family friend. As I talked and counseled on urinary retention, I had to ask if he was comfortable while I spoke about his BPH and urinary retention, upon which family asked the friend to leave. I spent some time counseling the family regarding time line of recovery and post stroke fatigue. Recommend that only 2 family members stay in the room at one time and minimize the sounds.   He has had only few drops of urination, scans have shown high volumes and straight cathed for 500 ml. He does have a history of BPH per his family but was not taking any medications. I counseled on bladder size optimazation, increased risk for pyelonephritis if high volumes persist. He is agreeable to starting Flomax and placing the bliss back to bladder rest and prevent trauma.     Coordinated with nursing.     Active Diet Order      Combination Diet Dysphagia Diet Level 1: Pureed; Nectar Thickened Liquids (pre-thickened or use instant food thickener)      Review Of Systems  Total of ten systems reviewed, pertinent positives and negatives as follows  Notes fatigue   Retention of urine requiring straight catherizations of 500, 376 ml   Output has been a few drops of urine   [place]   Remainder of the review of the systems was negative.      /67 (BP Location: Right arm)  Pulse 73  Temp 97.1  F (36.2  C) (Oral)  Resp 16  Ht 1.798 m (5' 10.8\")  Wt 71.6 kg (157 lb 12.8 oz)  SpO2 92%  BMI 22.13 kg/m2  Physical exam    Patient is sitting in chair comfortable in no acute distress   [place]   Good comprehension   HEENT NC AT PRTL EOM good   Neck supple  Heart S1S2  Lungs CTA  Abdomen  benign BS " positive NT NR   LE no edema         Current Facility-Administered Medications   Medication     acetaminophen (TYLENOL) tablet 650 mg     aspirin EC tablet 325 mg     atorvastatin (LIPITOR) tablet 20 mg     bisacodyl (DULCOLAX) Suppository 10 mg     ciprofloxacin (CIPRO) tablet 250 mg     lidocaine 2 % (URO-JET) jelly 5 mL     metoprolol tartrate (LOPRESSOR) tablet 25 mg     polyethylene glycol (MIRALAX/GLYCOLAX) Packet 17 g     senna-docusate (SENOKOT-S;PERICOLACE) 8.6-50 MG per tablet 1-2 tablet     tamsulosin (FLOMAX) capsule 0.4 mg        Labs:  Lab Results   Component Value Date    WBC 6.8 05/05/2018    HGB 13.4 05/05/2018    HCT 39.3 (L) 05/05/2018     05/05/2018     05/05/2018    POTASSIUM 3.8 05/05/2018    CHLORIDE 110 (H) 05/05/2018    CO2 24 05/05/2018    BUN 23 05/05/2018    CR 0.89 05/05/2018     (H) 05/05/2018    TROPI <0.015 04/30/2018    INR 1.03 04/29/2018       Attestation:  This patient has been seen and evaluated by me, Bre Graves MD.    Total time: 25 Minutes more than 50% in Care coordination / counseling time  Bre Graves MD

## 2018-05-05 NOTE — PLAN OF CARE
FOCUS/GOAL  Bladder management and Mobility    ASSESSMENT, INTERVENTIONS AND CONTINUING PLAN FOR GOAL:  Alert and oriented to person/place/situation. /68 at start of shift, 120/67 after morning meds administered. Denied c/o pain, shortness of breath, or nausea/vomitting. Continued to have difficulty voiding today, dribbled very small amount of fco colored urine in urinal this morning and otherwise denied feeling need to void. ISC x1 for 360 this morning. Reported to Dr Graves, with new orders received for indwelling catheter and Flomax. Rivas placed this afternoon by Vanesa Edwards RN, pt tolerating well. Fluids encouraged. CGA with walker/gait belt for transfers. Sensor pad alarm used at all times for safety. Will continue to monitor.

## 2018-05-06 PROCEDURE — 40000133 ZZH STATISTIC OT WARD VISIT: Performed by: OCCUPATIONAL THERAPIST

## 2018-05-06 PROCEDURE — 25000132 ZZH RX MED GY IP 250 OP 250 PS 637: Mod: GY | Performed by: PHYSICAL MEDICINE & REHABILITATION

## 2018-05-06 PROCEDURE — 40000193 ZZH STATISTIC PT WARD VISIT

## 2018-05-06 PROCEDURE — A9270 NON-COVERED ITEM OR SERVICE: HCPCS | Mod: GY | Performed by: PHYSICAL MEDICINE & REHABILITATION

## 2018-05-06 PROCEDURE — 97110 THERAPEUTIC EXERCISES: CPT | Mod: GO | Performed by: OCCUPATIONAL THERAPIST

## 2018-05-06 PROCEDURE — 92526 ORAL FUNCTION THERAPY: CPT | Mod: GN | Performed by: SPEECH-LANGUAGE PATHOLOGIST

## 2018-05-06 PROCEDURE — 97112 NEUROMUSCULAR REEDUCATION: CPT | Mod: GP

## 2018-05-06 PROCEDURE — 97127 ZZHC SP THERAPEUTIC INTERVENTION W/FOCUS ON COGNITIVE FUNCTION,EA 15 MIN: CPT | Mod: GN | Performed by: SPEECH-LANGUAGE PATHOLOGIST

## 2018-05-06 PROCEDURE — 97530 THERAPEUTIC ACTIVITIES: CPT | Mod: GP

## 2018-05-06 PROCEDURE — 97535 SELF CARE MNGMENT TRAINING: CPT | Mod: GO | Performed by: OCCUPATIONAL THERAPIST

## 2018-05-06 PROCEDURE — 97116 GAIT TRAINING THERAPY: CPT | Mod: GP

## 2018-05-06 PROCEDURE — 40000225 ZZH STATISTIC SLP WARD VISIT: Performed by: SPEECH-LANGUAGE PATHOLOGIST

## 2018-05-06 PROCEDURE — 12800006 ZZH R&B REHAB

## 2018-05-06 RX ADMIN — ACETAMINOPHEN 650 MG: 325 TABLET ORAL at 19:51

## 2018-05-06 RX ADMIN — METOPROLOL TARTRATE 25 MG: 25 TABLET, FILM COATED ORAL at 07:50

## 2018-05-06 RX ADMIN — CIPROFLOXACIN HYDROCHLORIDE 250 MG: 250 TABLET, FILM COATED ORAL at 19:51

## 2018-05-06 RX ADMIN — ACETAMINOPHEN 650 MG: 325 TABLET ORAL at 14:18

## 2018-05-06 RX ADMIN — TAMSULOSIN HYDROCHLORIDE 0.4 MG: 0.4 CAPSULE ORAL at 19:51

## 2018-05-06 RX ADMIN — ASPIRIN 325 MG: 325 TABLET, DELAYED RELEASE ORAL at 07:51

## 2018-05-06 RX ADMIN — METOPROLOL TARTRATE 25 MG: 25 TABLET, FILM COATED ORAL at 19:51

## 2018-05-06 RX ADMIN — ATORVASTATIN CALCIUM 20 MG: 10 TABLET, FILM COATED ORAL at 19:51

## 2018-05-06 RX ADMIN — CIPROFLOXACIN HYDROCHLORIDE 250 MG: 250 TABLET, FILM COATED ORAL at 07:50

## 2018-05-06 RX ADMIN — ACETAMINOPHEN 650 MG: 325 TABLET ORAL at 07:51

## 2018-05-06 NOTE — PLAN OF CARE
Problem: Patient Care Overview  Goal: Plan of Care/Patient Progress Review  PT: pt participated well in therapy, pt's daughter and grand daughter present for session, helped with encouraging fluids in which pt drank an entire nectar thick cup throughout session; pt ambulated up to 60' with FWW with cues for increasing L step length, PAVEL and for posture; ambulating and transferring with CGA to min A, verbal cues throughout session to wait for therapist before arising. Cont with POC.

## 2018-05-06 NOTE — PLAN OF CARE
Problem: Patient Care Overview  Goal: Plan of Care/Patient Progress Review  Outcome: Therapy, progress toward functional goals as expected  OT: Initiated light sitting UE strengthening with visual cues due to hearing deficits, tolerated well.      Sailaja BAI

## 2018-05-06 NOTE — PLAN OF CARE
Problem: Goal/Outcome  Goal: Goal Outcome Summary  FOCUS/GOAL  Bladder management, Nutrition/Feeding/Swallowing precautions and Medical management    ASSESSMENT, INTERVENTIONS AND CONTINUING PLAN FOR GOAL:  Patient is alert to self and place, has some forgetfulness/is unaware of time and needs reorientation and prompts at times.  Patient is very [place] but is able to communicate needs with extra time allowed and some repetition. Patient had family at bedside this evening during meal time, patient eats well for meals- still DD1 with nectar liquids and needs encouragement to drink enough fluids.  Rivas catheter is intact and draining but urine is dark fco with some clots noted, per report had sometimes clots noted before with straight cathing, patient denies any discomfort to bladder and/or at site, continue to monitor.  Patient started on flomax this shift, pharm changed order to suspension per request because had been capsule and patient is unable to swallow medications if not crushed, suspension mixed with applesauce and spoonfed.  No additional care concerns at this time, patient denies pain and VSS, continue with POC.

## 2018-05-06 NOTE — PLAN OF CARE
Problem: Goal/Outcome  Goal: Goal Outcome Summary  Outcome: No Change  Alert, disoriented to time. Call light in reach. [place], call light answered in person. Denies pain/discomfort. Rivas patent, drained 300 ml tea colored urine. Fluids encouraged when awake.  Appears to be sleeping most of night. Chair alarm on since bed alarm is not working. Continue with plan of care.

## 2018-05-06 NOTE — PLAN OF CARE
Problem: Patient Care Overview  Goal: Plan of Care/Patient Progress Review  Pt seen for swallow tx- upright for a meal. Prior to a meal completed oral cares and trialed 2 ice chips- cough on 1st but not on the second. Reviewed free water program guidelines with pt- but he will need strict supervision from staff or family to follow through- have posted guidelines on wall, on white board, in orders and in individualization plan. Guidelines for modified free water program: Pt ok to have 5-6 ice chips per hour but must be with strict supervision and must be only after oral cares and only in between meals.     Trialed DD2 textures at a meal- No aspiration s/s- mildly slower oral prep but pt generally clearing any oral residue- occasional trace lingual stasis but clears when f/u with sips of nectar thick liquids. Pt not reporting any sesnation of pharyngeal retention with DD2 solids- pt however on prior VFSS did have moderate pharyngeal retention with DD2 solids. Had pt swallow hard- 2x as needed and alternate with sips of nectar liquids - so seems to control for this. Recommending upgrade diet to DD2 with nectar thick liquids. Pt needs to upright for all po, take small bites/sips, alternate solids and liquids, swallow hard 2x for each bite. SLP to f/u for dieet tolerance/ education       Compelted task targeting word finding and also reasoning- unscrambling scrambled words- pt at 6/9 accuracy. Compelted writing assessment- pt was able to write his name and part of his address with his right hand as well as generate 3 sentences- needing increased time to organize thoughts and some decreased spelling and legibilty.   completed recall task- visual recall of a pictured scene- pt at 5/7 recall of details that were reviewed 1st,

## 2018-05-06 NOTE — PLAN OF CARE
FOCUS/GOAL  Mobility and Reinforcement of self-care/ADL    ASSESSMENT, INTERVENTIONS AND CONTINUING PLAN FOR GOAL:  Alert and oriented to person/place/situation with forgetfulness noted at times. Denied shortness of breath or nausea/vomitting. C/O back pain this morning, scheduled Tylenol given with relief reported. Ao1 with walker/gait belt for transfers. Rivas catheter intact and patent, draining fco colored urine with small amount of sediment noted. Pt denied any discomfort from catheter, catheter care done. Fluids encouraged, and has had family visiting this afternoon who have been encouraging fluids as well. Able to set up and eat breakfast independently this morning, diet advanced to DD2 with nectar thick liquids and modified free-water protocol (see order). Maintenance request submitted for malfunctioning bed alarm, new bed will be sent to unit. Will continue to monitor.

## 2018-05-07 PROCEDURE — 40000193 ZZH STATISTIC PT WARD VISIT: Performed by: STUDENT IN AN ORGANIZED HEALTH CARE EDUCATION/TRAINING PROGRAM

## 2018-05-07 PROCEDURE — 97112 NEUROMUSCULAR REEDUCATION: CPT | Mod: GP | Performed by: STUDENT IN AN ORGANIZED HEALTH CARE EDUCATION/TRAINING PROGRAM

## 2018-05-07 PROCEDURE — 97110 THERAPEUTIC EXERCISES: CPT | Mod: GP | Performed by: STUDENT IN AN ORGANIZED HEALTH CARE EDUCATION/TRAINING PROGRAM

## 2018-05-07 PROCEDURE — 25000132 ZZH RX MED GY IP 250 OP 250 PS 637: Mod: GY | Performed by: PHYSICAL MEDICINE & REHABILITATION

## 2018-05-07 PROCEDURE — 25000132 ZZH RX MED GY IP 250 OP 250 PS 637: Mod: GY | Performed by: PHYSICIAN ASSISTANT

## 2018-05-07 PROCEDURE — 92526 ORAL FUNCTION THERAPY: CPT | Mod: GN

## 2018-05-07 PROCEDURE — A9270 NON-COVERED ITEM OR SERVICE: HCPCS | Mod: GY | Performed by: PHYSICIAN ASSISTANT

## 2018-05-07 PROCEDURE — A9270 NON-COVERED ITEM OR SERVICE: HCPCS | Mod: GY | Performed by: PHYSICAL MEDICINE & REHABILITATION

## 2018-05-07 PROCEDURE — 97530 THERAPEUTIC ACTIVITIES: CPT | Mod: GP | Performed by: STUDENT IN AN ORGANIZED HEALTH CARE EDUCATION/TRAINING PROGRAM

## 2018-05-07 PROCEDURE — 97535 SELF CARE MNGMENT TRAINING: CPT | Mod: GO | Performed by: STUDENT IN AN ORGANIZED HEALTH CARE EDUCATION/TRAINING PROGRAM

## 2018-05-07 PROCEDURE — 12800006 ZZH R&B REHAB

## 2018-05-07 PROCEDURE — 97116 GAIT TRAINING THERAPY: CPT | Mod: GP | Performed by: STUDENT IN AN ORGANIZED HEALTH CARE EDUCATION/TRAINING PROGRAM

## 2018-05-07 PROCEDURE — 40000225 ZZH STATISTIC SLP WARD VISIT

## 2018-05-07 PROCEDURE — 40000133 ZZH STATISTIC OT WARD VISIT: Performed by: STUDENT IN AN ORGANIZED HEALTH CARE EDUCATION/TRAINING PROGRAM

## 2018-05-07 RX ADMIN — PSYLLIUM HUSK 1 PACKET: 3.4 POWDER ORAL at 12:37

## 2018-05-07 RX ADMIN — TAMSULOSIN HYDROCHLORIDE 0.4 MG: 0.4 CAPSULE ORAL at 20:37

## 2018-05-07 RX ADMIN — ACETAMINOPHEN 650 MG: 325 TABLET ORAL at 20:37

## 2018-05-07 RX ADMIN — METOPROLOL TARTRATE 25 MG: 25 TABLET, FILM COATED ORAL at 20:37

## 2018-05-07 RX ADMIN — ASPIRIN 325 MG: 325 TABLET, DELAYED RELEASE ORAL at 08:42

## 2018-05-07 RX ADMIN — ACETAMINOPHEN 650 MG: 325 TABLET ORAL at 08:42

## 2018-05-07 RX ADMIN — METOPROLOL TARTRATE 25 MG: 25 TABLET, FILM COATED ORAL at 08:42

## 2018-05-07 RX ADMIN — DICLOFENAC EPOLAMINE 1 PATCH: 0.01 PATCH TOPICAL at 20:37

## 2018-05-07 RX ADMIN — DICLOFENAC EPOLAMINE 1 PATCH: 0.01 PATCH TOPICAL at 12:48

## 2018-05-07 RX ADMIN — ACETAMINOPHEN 650 MG: 325 TABLET ORAL at 14:03

## 2018-05-07 RX ADMIN — ATORVASTATIN CALCIUM 20 MG: 10 TABLET, FILM COATED ORAL at 20:37

## 2018-05-07 NOTE — PROGRESS NOTES
".  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 He presents with left hemiparesis, dysphagia, on Eliquis, and post stroke fatigue.     On dysphagia diet now  Started on Flomax and bliss placed to bladder rest and prevent trauma.     Participating in therapies, motivated. Continue the intensity   Coordinated with nursing.       Active Diet Order      Combination Diet Dysphagia Diet Level 2: Mechan Altered; Nectar Thickened Liquids (pre-thickened or use instant food thickener)      Review Of Systems  Total of ten systems reviewed, pertinent positives and negatives as follows  Notes fatigue   Retention of urine requiring straight catherizations of 500, 376 ml   Bliss catheter.   Output has been a few drops of urine   [place]   Remainder of the review of the systems was negative.      /76 (BP Location: Left arm)  Pulse 68  Temp 96.4  F (35.8  C) (Oral)  Resp 16  Ht 1.798 m (5' 10.8\")  Wt 71.6 kg (157 lb 12.8 oz)  SpO2 94%  BMI 22.13 kg/m2  Physical exam    Patient is sitting in chair comfortable in no acute distress   [place]   Good comprehension   HEENT NC AT PRTL EOM good   Neck supple  Heart S1S2  Lungs CTA  Abdomen  benign BS positive NT NR   LE no edema         Current Facility-Administered Medications   Medication     acetaminophen (TYLENOL) tablet 650 mg     aspirin EC tablet 325 mg     atorvastatin (LIPITOR) tablet 20 mg     bisacodyl (DULCOLAX) Suppository 10 mg     ciprofloxacin (CIPRO) tablet 250 mg     lidocaine 2 % (URO-JET) jelly 5 mL     metoprolol tartrate (LOPRESSOR) tablet 25 mg     polyethylene glycol (MIRALAX/GLYCOLAX) Packet 17 g     senna-docusate (SENOKOT-S;PERICOLACE) 8.6-50 MG per tablet 1-2 tablet     tamsulosin (FLOMAX) suspension 0.4 mg        Labs:  Lab Results   Component Value " Date    WBC 6.8 05/05/2018    HGB 13.4 05/05/2018    HCT 39.3 (L) 05/05/2018     05/05/2018     05/05/2018    POTASSIUM 3.8 05/05/2018    CHLORIDE 110 (H) 05/05/2018    CO2 24 05/05/2018    BUN 23 05/05/2018    CR 0.89 05/05/2018     (H) 05/05/2018    TROPI <0.015 04/30/2018    INR 1.03 04/29/2018       Attestation:  This patient has been seen and evaluated by me, Bre Graves MD.    Total time: 25 Minutes more than 50% in Care coordination / counseling time  Bre Graves MD

## 2018-05-07 NOTE — PROGRESS NOTES
"  [facility]   Acute Rehabilitation Unit  Daily progress note    interval history  Angelito Castellanos was seen sitting up in chair completing meal.  Daughter Ethan at bedside.  Angelito is [place] pocket talker for duration of interview.  Reports he is feeling well, alert and oriented to person, place, location, unsure why he is at rehab, unable to appreciate any deficits.  Discussed swallow, memory impairment, etc, patient agreeable and pleasant.      Daughter reports chronic right shoulder pain, will try topical nsaid.  Also feeling a bit constipated and open to trying fiber.  Denies n/v/d, sob, fevers, dizziness, tolerating bliss when asked why it is in states \"couldn't pee much\", discussed UCx results and urinary retention.  Denies other concerns.     OT: MOCA cognitive screen completed. Pt scored a 14/30 indicating below normal cognition. Poor insight into deficits.   medications    acetaminophen  650 mg Oral TID     aspirin  325 mg Oral Daily     atorvastatin  20 mg Oral Daily     diclofenac  1 patch Transdermal BID    And     diclofenac   Transdermal Q8H    And     diclofenac   Transdermal BID     metoprolol tartrate  25 mg Oral BID     psyllium  1 packet Oral Daily     tamsulosin  0.4 mg Oral QPM        bisacodyl, lidocaine 2 %, polyethylene glycol, senna-docusate     physical exam  /83 (BP Location: Right arm)  Pulse 68  Temp 96.9  F (36.1  C) (Oral)  Resp 16  Ht 1.798 m (5' 10.8\")  Wt 71.6 kg (157 lb 12.8 oz)  SpO2 96%  BMI 22.13 kg/m2  Gen: awake alert nad  Cardio: irreg  Pulm: non labored on room air  Abd: soft non tender non distended  Ext: warm dry without edema  Neuro/MSK: alert oriented speech clear. Impaired insight into deficits.     labs  UA reviewed.    Rehabilitation - continue comprehensive acute inpatient rehabilitation program with multidisciplinary approach including therapies, rehab nursing, and physiatry following. See interval history for updates.  "     assessment and plan    Mr. Angelito Castellanos is a  89 year old yo man with a history of chronic a-fib s/p PPM, BPH, Reflux, and HTN who presented to [facility] 4/29  with slurred speech, left facial droop, and left sided weakness admitted to  ARU 5/3 for ongoing rehabilitation and medical management.       Acute ischemic stroke with large vessel occlusion- presented with left hemiparesis, dysarthria, dysphagia.  CT Head 4/30: Evolving ischemic infarcts in the right and left middle cerebral artery territories.Diffuse cerebral volume loss and cerebral white matter changes consistent with chronic small vessel ischemic disease  -continue DD1 with nectar  Continue asa- then start apixiban 5/14  -continue lipitor 20 mg daily (started by neurology)  -continue PT/OT/SLP  -follow up with neurology ([facility])  - HTN, A-Fib management as below    Chronic A-fib- continue asa 325 then in 2 weeks (through 5/13)  start apixiban per neurology recs  -continue metoprolol 25 mg bid    HTN- BP stable 130s-150s  -continue metoprolol    Urinary Retention- 5/4 UA with + hematuria, + LE, suspected UTI received 3 days cipro with UCx neg for pathogen.  DC Cipro  -continue bliss (placed 5/5)  and catheter cares  -continue flomax.     Patient  discussed with Dr. Moya  PM&R Staff Physician    Lottie Alarcon PA-C  Rehab Service  Pager: [phone]

## 2018-05-07 NOTE — PLAN OF CARE
Problem: Patient Care Overview  Goal: Plan of Care/Patient Progress Review  OT: MOCA cognitive screen completed. Pt scored a 14/30 indicating below normal cognition. Poor insight into deficits.

## 2018-05-07 NOTE — PROGRESS NOTES
Postural Assessment Scale for Stroke    Maintaining a posture  1. Sitting without support: 3  2. Standing with support: 3  3. Standing without support: 1  4. Standing on nonparetic le  5. Standing on paretic le    Changing posture  6. Supine to affected side lateral: 3  7. Supine to nonaffected side lateral: 3  8. Supine to sitting up on the edge of the table: 3  9. Sitting on the edge of the table to supine: 3  10. Sitting to standing up: 3  11. Standing up to sitting down: 3  12. Standing, picking up a pencil from the floor: 0    Total score:   27  /36

## 2018-05-07 NOTE — PLAN OF CARE
Problem: Goal/Outcome  Goal: Goal Outcome Summary  FOCUS/GOAL  Bladder management, Medical management and Mobility    ASSESSMENT, INTERVENTIONS AND CONTINUING PLAN FOR GOAL:  Patient is alert to self and place, has some forgetfulness/is unaware of time and needs reorientation and prompts at times.  Patient is very [place] but is able to communicate needs with extra time allowed and some repetition. Patient had family at bedside this evening during meal time, patient eats well for meals- progressed to DD2 still with nectar liquids and needs encouragement to drink enough fluids. Patient denies any pain on this shift, is able to ambulate with Ao1 and walker. Rivas catheter is intact and draining but urine is dark fco to red tinged, patient denies any discomfort to bladder and/or at site, continue to monitor- sticky note left for MD to f/u tomorrow with hematuria. Output is better on this shift since flomax and with encouragement of PO fluids, 550 out on this shift. No additional care concerns at this time, patient denies pain and VSS, continue with POC.

## 2018-05-07 NOTE — PLAN OF CARE
Problem: Patient Care Overview  Goal: Plan of Care/Patient Progress Review    Patient and family educated regarding [place] protocol and able to verbalize understanding of program.  Patient and isolated trials of thin liquid had 1 heart cough but otherwise tolerated without overt difficulties.  We will continue to trial hard of food textures and advance diet as tolerated.  Currently on NDD-2 diet with nectar thick liquids.

## 2018-05-07 NOTE — PLAN OF CARE
Problem: Patient Care Overview  Goal: Plan of Care/Patient Progress Review  Patient alert and disoriented to time and place, lungs sound clear, bowel sound active, no BM during the shift, Denied CP, lightheadedness, dizziness, numbness, tingling and SOB, bliss patency maintained, pt. is on bed alarm, AS1, upright on chair, repositioned and turned in bed, demonstrates the ability to use call light appropriately, will continue to monitor patient as POC.

## 2018-05-07 NOTE — PLAN OF CARE
Problem: Goal/Outcome  Goal: Goal Outcome Summary  Outcome: Improving  FOCUS/GOAL  Medical management    ASSESSMENT, INTERVENTIONS AND CONTINUING PLAN FOR GOAL:  Patient slept well, he is independent with turning to his right side. Bed alarm on.

## 2018-05-08 PROCEDURE — 40000225 ZZH STATISTIC SLP WARD VISIT

## 2018-05-08 PROCEDURE — A9270 NON-COVERED ITEM OR SERVICE: HCPCS | Mod: GY | Performed by: PHYSICIAN ASSISTANT

## 2018-05-08 PROCEDURE — 92526 ORAL FUNCTION THERAPY: CPT | Mod: GN

## 2018-05-08 PROCEDURE — 97535 SELF CARE MNGMENT TRAINING: CPT | Mod: GO

## 2018-05-08 PROCEDURE — 97110 THERAPEUTIC EXERCISES: CPT | Mod: GP

## 2018-05-08 PROCEDURE — 40000193 ZZH STATISTIC PT WARD VISIT

## 2018-05-08 PROCEDURE — 40000133 ZZH STATISTIC OT WARD VISIT

## 2018-05-08 PROCEDURE — 97116 GAIT TRAINING THERAPY: CPT | Mod: GP

## 2018-05-08 PROCEDURE — 25000132 ZZH RX MED GY IP 250 OP 250 PS 637: Mod: GY | Performed by: PHYSICIAN ASSISTANT

## 2018-05-08 PROCEDURE — A9270 NON-COVERED ITEM OR SERVICE: HCPCS | Mod: GY | Performed by: PHYSICAL MEDICINE & REHABILITATION

## 2018-05-08 PROCEDURE — 97127 ZZHC SP THERAPEUTIC INTERVENTION W/FOCUS ON COGNITIVE FUNCTION,EA 15 MIN: CPT | Mod: GN

## 2018-05-08 PROCEDURE — 97530 THERAPEUTIC ACTIVITIES: CPT | Mod: GP

## 2018-05-08 PROCEDURE — 12800006 ZZH R&B REHAB

## 2018-05-08 PROCEDURE — 25000132 ZZH RX MED GY IP 250 OP 250 PS 637: Mod: GY | Performed by: PHYSICAL MEDICINE & REHABILITATION

## 2018-05-08 RX ORDER — FINASTERIDE 5 MG/1
5 TABLET, FILM COATED ORAL DAILY
Status: DISCONTINUED | OUTPATIENT
Start: 2018-05-08 | End: 2018-05-22 | Stop reason: HOSPADM

## 2018-05-08 RX ADMIN — ACETAMINOPHEN 650 MG: 325 TABLET ORAL at 20:10

## 2018-05-08 RX ADMIN — ATORVASTATIN CALCIUM 20 MG: 10 TABLET, FILM COATED ORAL at 20:10

## 2018-05-08 RX ADMIN — DICLOFENAC EPOLAMINE 1 PATCH: 0.01 PATCH TOPICAL at 08:08

## 2018-05-08 RX ADMIN — SENNOSIDES AND DOCUSATE SODIUM 1 TABLET: 8.6; 5 TABLET ORAL at 20:10

## 2018-05-08 RX ADMIN — ACETAMINOPHEN 650 MG: 325 TABLET ORAL at 07:54

## 2018-05-08 RX ADMIN — METOPROLOL TARTRATE 25 MG: 25 TABLET, FILM COATED ORAL at 20:10

## 2018-05-08 RX ADMIN — ASPIRIN 325 MG: 325 TABLET, DELAYED RELEASE ORAL at 07:55

## 2018-05-08 RX ADMIN — FINASTERIDE 5 MG: 5 TABLET, FILM COATED ORAL at 20:10

## 2018-05-08 RX ADMIN — TAMSULOSIN HYDROCHLORIDE 0.4 MG: 0.4 CAPSULE ORAL at 20:10

## 2018-05-08 RX ADMIN — DICLOFENAC EPOLAMINE 1 PATCH: 0.01 PATCH TOPICAL at 20:12

## 2018-05-08 RX ADMIN — METOPROLOL TARTRATE 25 MG: 25 TABLET, FILM COATED ORAL at 07:55

## 2018-05-08 RX ADMIN — PSYLLIUM HUSK 1 PACKET: 3.4 POWDER ORAL at 07:56

## 2018-05-08 NOTE — PLAN OF CARE
Problem: Goal/Outcome  Goal: Goal Outcome Summary  FOCUS/GOAL  Bowel management and Bladder management    ASSESSMENT, INTERVENTIONS AND CONTINUING PLAN FOR GOAL:  Pt A&Ox4, able to make needs known. Pt FAUSTO, pocket talker at bedside. Rivas patent and draining, cares done. Pt denied constipation or need for stool softeners. LBM: 5/4 per chart but pt claims he does not remember LBM, continue to monitor. Alarms armed for safety. Tolerating DDII diet with nectar thick liquids, takes pills crushed with applesauce. Denied pain. Transfers with assist of one, gait belt and walker. Continue POC.

## 2018-05-08 NOTE — PLAN OF CARE
FOCUS/GOAL  Bowel management, Bladder management, Pain management, Skin integrity and Cognition/Memory/Judgment/Problem solving    ASSESSMENT, INTERVENTIONS AND CONTINUING PLAN FOR GOAL:  Pt alert and oriented during the night with some intermittent confusion, denied pain, SOB, or changes in sensation, bliss in place and draining appropriately, no BM this shift, continue to monitor for constipation, pocket talker at bedside due to [place], no further care concerns at this time continue with POC.

## 2018-05-08 NOTE — PLAN OF CARE
Problem: Goal/Outcome  Goal: Goal Outcome Summary  FOCUS/GOAL  Bowel management    ASSESSMENT, INTERVENTIONS AND CONTINUING PLAN FOR GOAL:  Laxatives given this morning, last documented BM 5-6-18, denies any discomfort. Pt was found sitting bedside for breakfast noted to be slowly falling back he was transferred to a chair with high back and was able to sit upright and complete his meal. Mod amount of assist required from sitting bedside to standing using walker leaning too far forward. Alarms activated.

## 2018-05-08 NOTE — PLAN OF CARE
Problem: Patient Care Overview  Goal: Plan of Care/Patient Progress Review  Cognitively, patient having significant difficulties with deductive reasoning tasks requiring higher than expected level of assistance in order to complete.    Dysphagia: Patient having some increased difficulties the state with nectar thick liquid consistencies.  Did trial NDD-3 food textures but patient does not yet appear ready for diet upgrade.  Recommend continue with current NDD-2 food textures with nectar thick liquids.

## 2018-05-09 PROCEDURE — 25000132 ZZH RX MED GY IP 250 OP 250 PS 637: Mod: GY | Performed by: PHYSICIAN ASSISTANT

## 2018-05-09 PROCEDURE — 40000133 ZZH STATISTIC OT WARD VISIT: Performed by: OCCUPATIONAL THERAPIST

## 2018-05-09 PROCEDURE — 97110 THERAPEUTIC EXERCISES: CPT | Mod: GP

## 2018-05-09 PROCEDURE — 92507 TX SP LANG VOICE COMM INDIV: CPT | Mod: GN | Performed by: SPEECH-LANGUAGE PATHOLOGIST

## 2018-05-09 PROCEDURE — 97112 NEUROMUSCULAR REEDUCATION: CPT | Mod: GP

## 2018-05-09 PROCEDURE — A9270 NON-COVERED ITEM OR SERVICE: HCPCS | Mod: GY | Performed by: PHYSICIAN ASSISTANT

## 2018-05-09 PROCEDURE — 97127 ZZHC SP THERAPEUTIC INTERVENTION W/FOCUS ON COGNITIVE FUNCTION,EA 15 MIN: CPT | Mod: GN | Performed by: SPEECH-LANGUAGE PATHOLOGIST

## 2018-05-09 PROCEDURE — 40000225 ZZH STATISTIC SLP WARD VISIT: Performed by: SPEECH-LANGUAGE PATHOLOGIST

## 2018-05-09 PROCEDURE — 12800006 ZZH R&B REHAB

## 2018-05-09 PROCEDURE — A9270 NON-COVERED ITEM OR SERVICE: HCPCS | Mod: GY | Performed by: PHYSICAL MEDICINE & REHABILITATION

## 2018-05-09 PROCEDURE — 97535 SELF CARE MNGMENT TRAINING: CPT | Mod: GO | Performed by: OCCUPATIONAL THERAPIST

## 2018-05-09 PROCEDURE — 25000132 ZZH RX MED GY IP 250 OP 250 PS 637: Mod: GY | Performed by: PHYSICAL MEDICINE & REHABILITATION

## 2018-05-09 PROCEDURE — 97116 GAIT TRAINING THERAPY: CPT | Mod: GP

## 2018-05-09 PROCEDURE — 97530 THERAPEUTIC ACTIVITIES: CPT | Mod: GO | Performed by: OCCUPATIONAL THERAPIST

## 2018-05-09 PROCEDURE — 92526 ORAL FUNCTION THERAPY: CPT | Mod: GN | Performed by: SPEECH-LANGUAGE PATHOLOGIST

## 2018-05-09 PROCEDURE — 97530 THERAPEUTIC ACTIVITIES: CPT | Mod: GP

## 2018-05-09 PROCEDURE — 40000193 ZZH STATISTIC PT WARD VISIT

## 2018-05-09 RX ORDER — BISACODYL 10 MG
10 SUPPOSITORY, RECTAL RECTAL DAILY PRN
Status: DISCONTINUED | OUTPATIENT
Start: 2018-05-09 | End: 2018-05-22 | Stop reason: HOSPADM

## 2018-05-09 RX ORDER — POLYETHYLENE GLYCOL 3350 17 G/17G
17 POWDER, FOR SOLUTION ORAL DAILY
Status: DISCONTINUED | OUTPATIENT
Start: 2018-05-09 | End: 2018-05-11

## 2018-05-09 RX ADMIN — DICLOFENAC EPOLAMINE 1 PATCH: 0.01 PATCH TOPICAL at 20:32

## 2018-05-09 RX ADMIN — METOPROLOL TARTRATE 25 MG: 25 TABLET, FILM COATED ORAL at 20:31

## 2018-05-09 RX ADMIN — METOPROLOL TARTRATE 25 MG: 25 TABLET, FILM COATED ORAL at 07:59

## 2018-05-09 RX ADMIN — ACETAMINOPHEN 650 MG: 325 TABLET ORAL at 13:35

## 2018-05-09 RX ADMIN — POLYETHYLENE GLYCOL 3350 17 G: 17 POWDER, FOR SOLUTION ORAL at 13:35

## 2018-05-09 RX ADMIN — ACETAMINOPHEN 650 MG: 325 TABLET ORAL at 20:31

## 2018-05-09 RX ADMIN — ACETAMINOPHEN 650 MG: 325 TABLET ORAL at 07:59

## 2018-05-09 RX ADMIN — ATORVASTATIN CALCIUM 20 MG: 10 TABLET, FILM COATED ORAL at 20:31

## 2018-05-09 RX ADMIN — ASPIRIN 325 MG: 325 TABLET, DELAYED RELEASE ORAL at 07:59

## 2018-05-09 RX ADMIN — TAMSULOSIN HYDROCHLORIDE 0.4 MG: 0.4 CAPSULE ORAL at 20:32

## 2018-05-09 RX ADMIN — FINASTERIDE 5 MG: 5 TABLET, FILM COATED ORAL at 07:59

## 2018-05-09 RX ADMIN — PSYLLIUM HUSK 1 PACKET: 3.4 POWDER ORAL at 07:59

## 2018-05-09 NOTE — PLAN OF CARE
"Problem: Patient Care Overview  Goal: Plan of Care/Patient Progress Review  SLP: after assisting with oral cares (set up). Observed pt with ice chips as well as tsps \"Free water\" no outward sx aspiration noted.  Will continue to assess - recent VFSS did not demonstrate aspiration with thin, but does intermittently show sx clinically.  Pt benefits from use of amplifier for all communication.  Noted mild difficulty determining problem/making inferences and noting details in pictured stimuli/problem solving task.      "

## 2018-05-09 NOTE — PLAN OF CARE
Problem: Goal/Outcome  Goal: Goal Outcome Summary  Outcome: No Change  FOCUS/GOAL  Bowel management, Bladder management and Nutrition/Feeding/Swallowing precautions    ASSESSMENT, INTERVENTIONS AND CONTINUING PLAN FOR GOAL:  Pt denied pain. Tolerating DD2, nectar thickened liquids, ate about 50% of supper with set up assist.   Rivas catheter in place for retention, draining fco urine, fluid intake encouraged.   LBM 5/4 per record. No bm today or yesterday per pt's report. Prn bowel med offered at HS.   Needing min A for turning side to side in bed. VSS. Will monitor bowel function.

## 2018-05-09 NOTE — PLAN OF CARE
Problem: Patient Care Overview  Goal: Plan of Care/Patient Progress Review  FOCUS/GOAL  Bowel management, Bladder management, Medical management and Reinforcement of self-care/ADL    ASSESSMENT, INTERVENTIONS AND CONTINUING PLAN FOR GOAL:  Patient is alert/oriented x3- can be forgetful/need prompting at times with care needs.  Patient is very [place] but with headphones and slow speech patient is able to understand.  Patient is still DD2 with nectar thickened liquids, pushing fluids on this shift, patient still has not had BM.  PA notified and ordered for Miralax to be scheduled, was given this afternoon, continue to f/u with bowel needs.  Patient denies any pain on this shift, bliss is intact and draining, no additional care concerns at this time, continue with POC.

## 2018-05-09 NOTE — PROGRESS NOTES
"  [facility]   Acute Rehabilitation Unit  Daily progress note    interval history  Angelito Castellanos was seen and examined at bedside. Wearing pocket talker for duration of visit.  Reports he is doing well, is alert and oriented to person, place, date, situation \"I had a stroke\", though did not name deficits.  Feeling well, eating \"pretty good\".  Denies n/v/d, sob, fevers, dizziness, had mild headache this am now resolved, feels shoulder pain is \"better\".  Discussed bliss does note problems with retention and initiating void in past.  Denies other concerns.     OT: Pt participated well in OT session focusing on dynamic standing balance. Pt continues to demonstrate difficulty with maneuvering FWW for functional mobility and transfers.       medications    acetaminophen  650 mg Oral TID     [START ON 5/14/2018] apixaban ANTICOAGULANT  5 mg Oral BID     aspirin  325 mg Oral Daily     atorvastatin  20 mg Oral Daily     diclofenac  1 patch Transdermal BID    And     diclofenac   Transdermal Q8H    And     diclofenac   Transdermal BID     finasteride  5 mg Oral Daily     metoprolol tartrate  25 mg Oral BID     polyethylene glycol  17 g Oral Daily     psyllium  1 packet Oral Daily     tamsulosin  0.4 mg Oral QPM        bisacodyl, lidocaine 2 %, polyethylene glycol, senna-docusate     physical exam  /88 (BP Location: Right arm)  Pulse 87  Temp 97.4  F (36.3  C) (Oral)  Resp 16  Ht 1.798 m (5' 10.8\")  Wt 71.6 kg (157 lb 12.8 oz)  SpO2 97%  BMI 22.13 kg/m2     Gen: NAD, sitting in chair   Pulm: non labored clear on room air  CV: rrr   Abd: soft non tender   Ext: warm dry without edema, no tenderness at calves  Neuro/MSK: [place], mild hemiparesis on the left side.  Alert oriented speech clear, follows commands, R shoulder injury.  RUE 4/5 throughout.  LUE shoulder 4/5, left ef 4-/5, ee 4/5,  4/5, left hip flexor 4-/5, right 4/5.      labs  No new today     Rehabilitation - continue " comprehensive acute inpatient rehabilitation program with multidisciplinary approach including therapies, rehab nursing, and physiatry following. See interval history for updates.      assessment and plan    Mr. Angelito Castellanos is a  89 year old man with a history of chronic a-fib s/p PPM, BPH, Reflux, and HTN who presented to [facility] 4/29  with slurred speech, left facial droop, and left sided weakness admitted to  ARU 5/3 for ongoing rehabilitation and medical management.       Acute ischemic stroke with large vessel occlusion- presented with left hemiparesis, dysarthria, dysphagia.  CT Head 4/30: Evolving ischemic infarcts in the right and left middle cerebral artery territories.Diffuse cerebral volume loss and cerebral white matter changes consistent with chronic small vessel ischemic disease  -continue DD1 with nectar. 05/08/18 now on DD2 with nectar thick.   Continue asa- then start apixiban 5/14  -continue lipitor 20 mg daily (started by neurology)  -continue PT/OT/SLP  -follow up with neurology ([facility])  - HTN, A-Fib management as below    Chronic A-fib- continue asa 325 then in 2 weeks (through 5/13)  start apixiban per neurology recs.  PPM in place.   -continue metoprolol 25 mg bid    HTN- BP stable 120s-150s  -continue metoprolol    Urinary Retention- 5/4 UA with + hematuria, + LE, suspected UTI received 3 days cipro with UCx neg for pathogen.   Both patient and his daughter reported urinary obstructive symptom prior to admission including urge incontinence, dribbling, straining and weak stream  -continue bliss (placed 5/5)  and catheter cares-   -continue flomax & finasteride  -f/u with urology team as outpatient      Constipation- reportedly without BM for several days, no n/v/, no ab pain, passing gas  -continue fiber  -add scheduled miralax  -continue prn senokot & miralax      Lottie Alarcon PA-C  Physical Medicine & Rehabilitation      Case discussed with Dr. Moya

## 2018-05-09 NOTE — PROGRESS NOTES
"  [facility]   Acute Rehabilitation Unit  Daily progress note    interval history  Angelito Castellanos was seen and examined at bedside. Daughter came later to visit. Doing well; no pain or discomfort. Discussed his urinary symptoms prior to admission; had urge incontinence, dribbling, straining and weak stream. Daughter noted that these symptoms were really affecting his quality of life and they would like to pursue surgical options if indicated after his recovery from stroke. Discussed about finasteride and they were agreeable. Will plan for f/u with urology as outpatient.    Discussed discharge planning. [facility]; [facility] have plan to take him to an prison close to Shae's house until he can return home.     Remains on DD2 with nectar thick liquids. Having significant difficulties with deductive reasoning tasks requiring higher than expected level of assistance in order to complete per SLP notes. Scored 27/36 per PASS.       medications    acetaminophen  650 mg Oral TID     [START ON 5/14/2018] apixaban ANTICOAGULANT  5 mg Oral BID     aspirin  325 mg Oral Daily     atorvastatin  20 mg Oral Daily     diclofenac  1 patch Transdermal BID    And     diclofenac   Transdermal Q8H    And     diclofenac   Transdermal BID     finasteride  5 mg Oral Daily     metoprolol tartrate  25 mg Oral BID     psyllium  1 packet Oral Daily     tamsulosin  0.4 mg Oral QPM        bisacodyl, lidocaine 2 %, polyethylene glycol, senna-docusate     physical exam  /71 (BP Location: Right arm)  Pulse 72  Temp 97.2  F (36.2  C) (Oral)  Resp 12  Ht 1.798 m (5' 10.8\")  Wt 71.6 kg (157 lb 12.8 oz)  SpO2 96%  BMI 22.13 kg/m2     Gen: NAD, sitting in chair   Pulm: non labored on room air  Abd: soft non tender   Ext: warm dry without edema, no tenderness at calves  Neuro/MSK: [place], mild hemiparesis on the left side. Had difficulty following instructions for " neuro exam likely due to [place]. Shoulder active and passive ROM limited b/l with pain at the end of range (90 with abd and FF). Reported chronic issue and h/o injuries in the past.  was almost symmetric and 4/5. Sensation intact to LT throughout.    labs  No new today     Rehabilitation - continue comprehensive acute inpatient rehabilitation program with multidisciplinary approach including therapies, rehab nursing, and physiatry following. See interval history for updates.      assessment and plan    Mr. Angelito Castellanos is a  89 year old man with a history of chronic a-fib s/p PPM, BPH, Reflux, and HTN who presented to [facility] 4/29  with slurred speech, left facial droop, and left sided weakness admitted to  ARU 5/3 for ongoing rehabilitation and medical management.       Acute ischemic stroke with large vessel occlusion- presented with left hemiparesis, dysarthria, dysphagia.  CT Head 4/30: Evolving ischemic infarcts in the right and left middle cerebral artery territories.Diffuse cerebral volume loss and cerebral white matter changes consistent with chronic small vessel ischemic disease  -continue DD1 with nectar. 05/08/18 now on DD2 with nectar thick.   Continue asa- then start apixiban 5/14  -continue lipitor 20 mg daily (started by neurology)  -continue PT/OT/SLP  -follow up with neurology ([facility])  - HTN, A-Fib management as below    Chronic A-fib- continue asa 325 then in 2 weeks (through 5/13)  start apixiban per neurology recs  -continue metoprolol 25 mg bid    HTN- BP stable 130s-150s  -continue metoprolol    Urinary Retention- 5/4 UA with + hematuria, + LE, suspected UTI received 3 days cipro with UCx neg for pathogen.  DC Cipro  Both patient and his daughter reported urinary obstructive symptom prior to admission including urge incontinence, dribbling, straining and weak stream  -continue bliss (placed 5/5)  and catheter cares  -continue flomax.   -started finasteride 2/8  -f/u with urology team as  outpatient          Naima Moya MD  Physical Medicine & Rehabilitation    I spent a total of 35 minutes face-to-face and managing the care of Angelito Castellanos. Over 50% of my time on the unit was spent counseling the patient and coordinating care. See note for details.

## 2018-05-09 NOTE — PLAN OF CARE
Problem: Goal/Outcome  Goal: Goal Outcome Summary  FOCUS/GOAL  Bowel management, Bladder management, Pain management, Mobility, Cognition/Memory/Judgment/Problem solving and Safety management    ASSESSMENT, INTERVENTIONS AND CONTINUING PLAN FOR GOAL:  Pt is alert and oriented x 3. Rivas catheter is patent and intact. No BM overnight. Denied pain or discomfort this shift. Independent with bed mobility. Pt slept well throughout the night. Call light within reach. Able to make needs known. Will continue with POC.

## 2018-05-09 NOTE — PROGRESS NOTES
[facility] CLARIFICATION NOTE FOR ADMIT ASSESSMENT PERIOD  Lowest score for each FIM item supported by available charting    Eating: FIM 5  Grooming: FIM 4  Bathing: FIM 3  Upper Body Dressing: FIM 3  Lower Body Dressing: FIM 1; was dependent with shoes on 5/4/18  Toileting: FIM 2  Bladder:  FIM 1; total assist managing bliss cathether  Bladder Number of Accidents: 1  Bowel: FIM 3; ModA to position bed pan for BM  Bowel Number of Accidents: 1  Transfer: FIM 3  Toilet transfer: FIM 4  Tub/shower transfer: FIM 5  Locomotion distance:  20 feet  Locomotion: FIM 1; due to not walking 50 feet  Stairs: FIM 1; due to only completing 4 steps  Comprehension: FIM 4; Patient [place], using pocket talker  Expression: FIM 6; extra time needed  Social Interaction: FIM 6; occasional cues needed  Problem solving: FIM 3; impulsivity noted, bed alarm   Memory: FIM 3; difficulties with recall noted in charting, unable to remember/recall at times, moderate cues

## 2018-05-09 NOTE — PLAN OF CARE
Problem: Patient Care Overview  Goal: Plan of Care/Patient Progress Review  OT: Pt participated well in OT session focusing on dynamic standing balance. Pt continues to demonstrate difficulty with maneuvering FWW for functional mobility and transfers.

## 2018-05-10 PROCEDURE — A9270 NON-COVERED ITEM OR SERVICE: HCPCS | Mod: GY | Performed by: PHYSICIAN ASSISTANT

## 2018-05-10 PROCEDURE — 25000132 ZZH RX MED GY IP 250 OP 250 PS 637: Mod: GY | Performed by: PHYSICIAN ASSISTANT

## 2018-05-10 PROCEDURE — 40000225 ZZH STATISTIC SLP WARD VISIT

## 2018-05-10 PROCEDURE — 25000132 ZZH RX MED GY IP 250 OP 250 PS 637: Mod: GY | Performed by: PHYSICAL MEDICINE & REHABILITATION

## 2018-05-10 PROCEDURE — 92526 ORAL FUNCTION THERAPY: CPT | Mod: GN

## 2018-05-10 PROCEDURE — 97127 ZZHC SP THERAPEUTIC INTERVENTION W/FOCUS ON COGNITIVE FUNCTION,EA 15 MIN: CPT | Mod: GN

## 2018-05-10 PROCEDURE — 97112 NEUROMUSCULAR REEDUCATION: CPT | Mod: GO | Performed by: OCCUPATIONAL THERAPIST

## 2018-05-10 PROCEDURE — A9270 NON-COVERED ITEM OR SERVICE: HCPCS | Mod: GY | Performed by: PHYSICAL MEDICINE & REHABILITATION

## 2018-05-10 PROCEDURE — 40000133 ZZH STATISTIC OT WARD VISIT: Performed by: OCCUPATIONAL THERAPIST

## 2018-05-10 PROCEDURE — 12800006 ZZH R&B REHAB

## 2018-05-10 PROCEDURE — 97535 SELF CARE MNGMENT TRAINING: CPT | Mod: GO | Performed by: OCCUPATIONAL THERAPIST

## 2018-05-10 RX ORDER — BISACODYL 10 MG
10 SUPPOSITORY, RECTAL RECTAL ONCE
Status: COMPLETED | OUTPATIENT
Start: 2018-05-10 | End: 2018-05-10

## 2018-05-10 RX ADMIN — METOPROLOL TARTRATE 25 MG: 25 TABLET, FILM COATED ORAL at 08:30

## 2018-05-10 RX ADMIN — METOPROLOL TARTRATE 25 MG: 25 TABLET, FILM COATED ORAL at 22:17

## 2018-05-10 RX ADMIN — ACETAMINOPHEN 650 MG: 325 TABLET ORAL at 22:18

## 2018-05-10 RX ADMIN — ACETAMINOPHEN 650 MG: 325 TABLET ORAL at 08:30

## 2018-05-10 RX ADMIN — FINASTERIDE 5 MG: 5 TABLET, FILM COATED ORAL at 08:30

## 2018-05-10 RX ADMIN — POLYETHYLENE GLYCOL 3350 17 G: 17 POWDER, FOR SOLUTION ORAL at 08:31

## 2018-05-10 RX ADMIN — ACETAMINOPHEN 650 MG: 325 TABLET ORAL at 13:55

## 2018-05-10 RX ADMIN — TAMSULOSIN HYDROCHLORIDE 0.4 MG: 0.4 CAPSULE ORAL at 22:20

## 2018-05-10 RX ADMIN — ATORVASTATIN CALCIUM 20 MG: 10 TABLET, FILM COATED ORAL at 22:18

## 2018-05-10 RX ADMIN — PSYLLIUM HUSK 1 PACKET: 3.4 POWDER ORAL at 08:30

## 2018-05-10 RX ADMIN — BISACODYL 10 MG: 10 SUPPOSITORY RECTAL at 12:59

## 2018-05-10 RX ADMIN — ASPIRIN 325 MG: 325 TABLET, DELAYED RELEASE ORAL at 08:30

## 2018-05-10 NOTE — PLAN OF CARE
Problem: Patient Care Overview  Goal: Plan of Care/Patient Progress Review  FOCUS/GOAL  Bowel management, Nutrition/Feeding/Swallowing precautions and Reinforcement of self-care/ADL    ASSESSMENT, INTERVENTIONS AND CONTINUING PLAN FOR GOAL:  Patient is alert/oriented, has some forgetfulness and can have poor safety awareness as well so continuing with alarms at this time.  Patient is able to verbalize needs but does not proactively ask for help or communicate needs without prompting but if prompted is able to adequately communicate.  Patient denies any pain on this shift, bliss is draining- exit site is clean and dry.  Care rounds held for patient and will have care conference next Wednesday with d/c date further out (possibly 5/24), discussed also possible trial w/o bliss, may initiate trial on Monday- for now to try with leg bag for easier management in the meantime.  Patient reported had a bm last night but that it was hard, was able to take PO bowel meds this am but still no bowel movement late am- patient requested to the MD to try a supp and supp was given at 1pm, patient had large continent stool in the toilet at 2:15pm.  Patient needs CGA o1 with walker for transfers, continue one DD2 with nectar thickened liquids and is taking medications crushed in applesauce.  No additional care concerns, continue with POC.

## 2018-05-10 NOTE — PLAN OF CARE
Pt Alert and oriented. Bed alarm is ON and active for safety. Afebrile. Pt takes medications with pudding and/or crushed if possible. VSS. Lungs- Clear bilaterally with both anterior and posterior. Bowels- active in all four quadrants, CMS and Neuro's are intact. Denies numbness and tingling in all extremities. Denies nausea, shortness of breath, pain, and chest pain. Rivas intact and draining clear yellow urine. Pt ambulated to BR, +BM this shift. Drinking well and appetite was Fair this shift. Bilateral heels are elevated off the bed. Pt is able to make needs known and the call light is within the pt's reach. No new concern reported. Continue to monitor.

## 2018-05-10 NOTE — PLAN OF CARE
"Acute Rehab Care Conference/Team Rounds      Type: Team Rounds    Present: Dr Naima Moya, Lottie Alarcon PA, Gita Gray SW, Kacy Ledbetter OT, Ana Justin PT, Luz Marina Rodriguez SLP, Vanesa Saldana RN, Laura Rea Dietician, Tati Mathew , Ariel Butler      Discharge Barriers/Treatment/Education    Rehab Diagnosis: stroke    Active Medical Co-morbidities/Prognosis: HTN, A fib, urinary retention     Safety: Very [place]. Amplifier with microphone used for communication. Bed alarm used for safety. Forgetful at times. Able to use call light appropriately.     Pain: Occasional back pain managed (none reported since 5/6) with scheduled Tylenol. Negligible pain otherwise.     Medications, Skin, Tubes/Lines: Rivas catheter. No skin issues.     Swallowing/Nutrition:SLP: pt is on NDD 2/nectar diet.  He is allowed \"free water\"/ice chips with supervision. Pt had recent VFSS without aspiration noted, but shows intermittent sx clinically.  Inconsistent in use of recommended safe swallow strategy of double swallow and alternating of bites and drinks. Recommend continue with current NDD-2 with NTL but anticipate abilty to upgrade to NDD-3 food textures in the next couple of days.    Bowel/Bladder: Rivas catheter present for retention. Occasional stool incontinence.     Psychosocial: Pt is a 89-year-old male who lives with his daughter, Shae, in a house in [place], [place]. Pt has another daughter, Ethan, that lives locally. Both are supportive and involved. Family is actively looking at assisted living respite to ease transition home. Pt/family's ultimate goal is for pt to return home with continued family support. Team working towards a safe d/c plan.     ADLs/IADLs: Pt making slow but steady progress. Pt requires min A with UBD and mod A with pants. Pt requires max A with socks and shoes. Pt requires close SBA/CGA with G/H tasks standing at EOS with FWW. Pt requires min A with toilet " transfers with FWW or at W/C level with SPT, and requires min A with toilet hygiene. Pt requires mod A with washing/rinsing/drying 3-5 body parts. Pt scored 14/30 on MOCA indicating cognitive deficits. Pt will likely need increased assistance ADLs and IADLs upon D/C. Recommend HC vs OP OT services upon D/C pending progress.     Mobility: ambulating with FWW and A. Will try other options like cane, walking stick and no assistive device. Cognitive deficits and poor safety awareness interfering.    Cognition/Language:SLP: pt demonstrates moderate deficits for cognitive communication skills for reasoning, memory/attention, as well as mild word finding.  He was semi-independent prior, so appears communication function has worsened s/p CVAs 2/2 performance on tasks.  Will continue to investigate with family.  At this time would anticipate pt needing higher level of supervision/support. Family already loooking into sites with more assistance possible.  Further SLP tx yet to be determined.    Community Re-Entry: likely need wc    Transportation: will need assist, family training for transfer    Decision maker: self    Plan of Care and goals reviewed and updated.    Discharge Plan/Recommendations    Fall Precautions: continue    Overall plan for the patient: has made some progress since admission over the past week. Cognitive deficits and poor safety awareness interfering. Goal is to be mod I with mobility and ADLs but will require 24/7 for safety. Will remove bliss for another TOV before discharge. Plan for a care conference next wekk; family is exploring [facility] options close to home.         Utilization Review and Continued Stay Justification    Medical Necessity Criteria:    For any criteria that is not met, please document reason and plan for discharge, transfer, or modification of plan of care to address.    Requires intensive rehabilitation program to treat functional deficits?: Yes    Requires 3x per week or greater  involvement of rehabilitation physician to oversee rehabilitation program?: Yes    Requires rehabilitation nursing interventions?: Yes    Patient is making functional progress?: Yes    There is a potential for additional functional progress? Yes    Patient is participating in therapy 3 hours per day a minimum of 5 days per week or 15 hours per week in 7 day period?:Yes    Has discharge needs that require coordinated discharge planning approach?:Yes        Final Physician Sign off    Statement of Approval: I agree with all the recommendations detailed in this document.      Patient Goals    1. Patient Goal: Social Work Focus: Patient and family will be involved in discharge planning.  2. Patient Goal: Social Work Focus: Patient will receive appropriate services/resources to meet their needs.      OT target date for goal attainment: 05/17/18  OT Frequency: daily for 60-90 minutes  OT goal: hygiene/grooming: supervision/stand-by assist, while standing  OT goal: upper body dressing: Modified independent, including set-up/clothing retrieval  OT goal: lower body dressing: Supervision/stand-by assist, including set-up/clothing retrieval  OT goal: upper body bathing: Supervision/stand-by assist  OT goal: lower body bathing: Supervision/stand-by assist, using adaptive equipment  OT goal: toilet transfer/toileting: Supervision/stand-by assist, toilet transfer, cleaning and garment management  OT goal: meal preparation: Supervision/stand-by assist, with simple meal preparation, ambulatory level  OT goal: home management: Supervision/stand-by assist, with light demand household tasks, ambulatory level  OT goal: cognitive: Patient/caregiver will verbalize understanding of cognitive assessment results/recommendations as needed for safe discharge planning  OT goal 1: Pt will safely complete bathtub transfers with S/SBA utilizing appropriate AE/DME.  OT goal 2: Pt will safely complete LUE HEP with supervision to increase  strengthening and dexterity needed for ADLs and IADLs.       PT target date for goal attainment: 05/17/18  PT Frequency: 60-75 minutes daily  PT goal: bed mobility: Modified independent, Rolling, Bridging, Supine to/from sit (using bedrail)  PT goal: transfers: Modified independent, Bed to/from chair, Assistive device, Sit to/from stand (using FWW for home negotiation)  PT goal: gait: 50 feet, Supervision/stand-by assist, Greater than 200 feet, Minimal assist (SBA fo home, CGA for limited community)  PT goal: stairs: 7 stairs, Minimal assist, Rail on right (for home access)  PT goal: perform aerobic activity with stable cardiovascular response: 10 minutes, continuous activity (for cardio health, endurance, OMNI no > 7/10)  PT goal 1: using handout perform strength HEP SBA for improved functional mobility  PT Goal 2: using handout perform balance HEP with SBA for improved funcitonal mobility  PT Goal 3: Folowing education pt & family will state 3 or > fall prevention strategies to demo knowledge of how to reduce fall risk  PT Goal 4: perform floor transfer w/ furniture assist MIN A to demo safe floor recovery     SLP target date for goal attainment: 05/17/18  SLP Frequency: daily  SLP Swallow Goal:  Safely tolerate diet without signs/symptoms of aspiration: regular diet, thin liquids, with use of swallow precautions, with assistance/supervision   SLP goal 1: Patient will complete complex level verbal expression tasks with 90% accuracy to express complex though processes.   SLP goal 2: Pt will utilize compensatory attention and memory strategies to complete basic tomod level short term recall and flexible/alternating attention tasks with 90% accuracy  SLP goal 3: Pt will complete basic tomod level problem solving/ reasnoning tasks with 90% accuracy     Nursing goals  Patient Goal:  Pain Management: pt will self advocate for pain management. will request for pain interventions as pain occurs.    Patient/Family Goal:  Medication Management: Pt will participate in and pass Map by discharge.     Goal: Mobility: Pt will be independent  in all aspects of mobility by discharge.

## 2018-05-10 NOTE — PLAN OF CARE
Problem: Patient Care Overview  Goal: Plan of Care/Patient Progress Review    Patient seen with lunch meal with trial of NDD-3 food texture. Patient had cough x1. Inconsistent in use of recommended safe swallow strategy of double swallow and alternating of bites and drinks. Recommend continue with current NDD-2 with NTL but anticipate abilty to upgrade to NDD-3 food textures in the next couple of days.

## 2018-05-10 NOTE — PROGRESS NOTES
"CLINICAL NUTRITION SERVICES - ASSESSMENT NOTE     Nutrition Prescription    RECOMMENDATIONS FOR MDs/PROVIDERS TO ORDER:  None today    Malnutrition Status:    Patient does not meet criteria    Recommendations already ordered by Registered Dietitian (RD):  None    Future/Additional Recommendations:  Recommend ongoing diet advancement as medically appropriate.  Consider addition of oral nutrition supplements if po intake declines and/ or weight trends down.     REASON FOR ASSESSMENT  Angelito Castellanos is a/an 89 year old male assessed by the dietitian for LOS    NUTRITION HISTORY  Patient is on a regular diet at home. He manages his diabetes with diet.    CURRENT NUTRITION ORDERS  Diet: Dysphagia Level 2:  Mechanically Altered and Nectar Thickened Liquids   Intake/Tolerance: Patient is typically ordering well for 3 meals/day (on room service with assist). Intakes trending 50-75% per flowsheet documentation. Patient reports he is eating well.    LABS  Labs reviewed  Hgb A1C: 7 (4/29)    MEDICATIONS  Medications reviewed    ANTHROPOMETRICS  Height: 179.8 cm (5' 10.8\")  Most Recent Weight: 71.6 kg (157 lb 12.8 oz)    IBW: 75.92 kg  BMI: Normal BMI  Weight History: Patient denies any recent significant weight changes.  Wt Readings from Last 5 Encounters:   05/03/18 71.6 kg (157 lb 12.8 oz)   05/03/18 56.5 kg (124 lb 9.6 oz)       Dosing Weight: 72 kg    ASSESSED NUTRITION NEEDS  Estimated Energy Needs: 3199-9839 kcals/day (25 - 30 kcals/kg)  Justification: Maintenance  Estimated Protein Needs: 76-86 grams protein/day (1 - 1.2 grams of pro/kg)  Justification: Increased needs  Estimated Fluid Needs: 1 mL/kcal or per MD goals   Justification: Maintenance    PHYSICAL FINDINGS  See malnutrition section below.    MALNUTRITION  % Intake: Decreased intake does not meet criteria  % Weight Loss: None noted  Subcutaneous Fat Loss: None observed  Muscle Loss: None observed  Fluid Accumulation/Edema: None noted  Malnutrition Diagnosis: " Patient does not meet two of the above criteria necessary for diagnosing malnutrition    NUTRITION DIAGNOSIS  Predicted inadequate nutrient intake (energy/ protein) related to modified texture diet as evidenced by anticipated intake less than estimated needs.      INTERVENTIONS  Implementation  Nutrition Education: review the importance of adequate nutrition/ hydration to support healing.      Goals  Patient to consume % of nutritionally adequate meal trays TID, or the equivalent with supplements/snacks.     Monitoring/Evaluation  Progress toward goals will be monitored and evaluated per protocol.      Laura Rea RD

## 2018-05-10 NOTE — PLAN OF CARE
Problem: Patient Care Overview  Goal: Plan of Care/Patient Progress Review  Outcome: Therapy, unable to show any progress toward functional goals  PTA: Pt declined to participate with PT this am reporting constipation and increased pain with this. Educated pt and provided encouragement for mobility to aide with this, however pt continued to decline to participate at this time. Will attempt to reschedule as schedule allows. -60 min.

## 2018-05-10 NOTE — PLAN OF CARE
Problem: Goal/Outcome  Goal: Goal Outcome Summary  Social Work Services Progress Note    Hospital Day: 8  Collaborated with:  Pt/[facility], [facility] contact    Data:  Received voicemail from [facility] (Dora, Ph. [phone]) stating pt's family is looking at assisted living placement at their location and would like referral info. She stated that the family signed a release and requested fax number to send ALAINA to [facility]. Called back and provided fax number in a voicemail.     Met with pt and daughter, Ethan, and confirmed that they are agreeable to SW sending referral info to [facility]. Daughter reported that they're arranging respite assisted living to help provide 24/7 supervision to pt with the ultimate goal of getting pt home to daughter's house. Ethan also reported that they're working on getting [facility] to see if there are additional services for which he may qualify.     Called [facility] back 2x times but have not received a call back yet.     Intervention:  Discharge planning, coordination with community contacts    Assessment:  Pt/daughter were pleasant and receptive to SW visit. Family seems to be working proactively towards a safe d/c plan for pt.    Plan:    Anticipated Disposition:  Home with services    Barriers to d/c plan:  Progression with therapies     Follow Up:  SW will continue to follow, support and assist with ongoing social service and discharge planning needs.     DEBBY Navarro, [facility]  - Coverage for Acute Rehabilitation Center  Pager: [phone]  [email or web address]     NO LETTER

## 2018-05-11 PROCEDURE — 92526 ORAL FUNCTION THERAPY: CPT | Mod: GN | Performed by: SPEECH-LANGUAGE PATHOLOGIST

## 2018-05-11 PROCEDURE — 40000193 ZZH STATISTIC PT WARD VISIT: Performed by: STUDENT IN AN ORGANIZED HEALTH CARE EDUCATION/TRAINING PROGRAM

## 2018-05-11 PROCEDURE — 40000133 ZZH STATISTIC OT WARD VISIT: Performed by: OCCUPATIONAL THERAPIST

## 2018-05-11 PROCEDURE — A9270 NON-COVERED ITEM OR SERVICE: HCPCS | Mod: GY | Performed by: PHYSICAL MEDICINE & REHABILITATION

## 2018-05-11 PROCEDURE — 25000132 ZZH RX MED GY IP 250 OP 250 PS 637: Mod: GY | Performed by: PHYSICIAN ASSISTANT

## 2018-05-11 PROCEDURE — 97530 THERAPEUTIC ACTIVITIES: CPT | Mod: GO | Performed by: OCCUPATIONAL THERAPIST

## 2018-05-11 PROCEDURE — 97116 GAIT TRAINING THERAPY: CPT | Mod: GP | Performed by: STUDENT IN AN ORGANIZED HEALTH CARE EDUCATION/TRAINING PROGRAM

## 2018-05-11 PROCEDURE — A9270 NON-COVERED ITEM OR SERVICE: HCPCS | Mod: GY | Performed by: PHYSICIAN ASSISTANT

## 2018-05-11 PROCEDURE — 25000132 ZZH RX MED GY IP 250 OP 250 PS 637: Mod: GY | Performed by: PHYSICAL MEDICINE & REHABILITATION

## 2018-05-11 PROCEDURE — 12800006 ZZH R&B REHAB

## 2018-05-11 PROCEDURE — 40000225 ZZH STATISTIC SLP WARD VISIT: Performed by: SPEECH-LANGUAGE PATHOLOGIST

## 2018-05-11 PROCEDURE — 97530 THERAPEUTIC ACTIVITIES: CPT | Mod: GP | Performed by: STUDENT IN AN ORGANIZED HEALTH CARE EDUCATION/TRAINING PROGRAM

## 2018-05-11 PROCEDURE — 97127 ZZHC SP THERAPEUTIC INTERVENTION W/FOCUS ON COGNITIVE FUNCTION,EA 15 MIN: CPT | Mod: GN | Performed by: SPEECH-LANGUAGE PATHOLOGIST

## 2018-05-11 PROCEDURE — 97535 SELF CARE MNGMENT TRAINING: CPT | Mod: GO | Performed by: OCCUPATIONAL THERAPIST

## 2018-05-11 RX ADMIN — METOPROLOL TARTRATE 25 MG: 25 TABLET, FILM COATED ORAL at 08:36

## 2018-05-11 RX ADMIN — ACETAMINOPHEN 650 MG: 325 TABLET ORAL at 20:57

## 2018-05-11 RX ADMIN — TAMSULOSIN HYDROCHLORIDE 0.4 MG: 0.4 CAPSULE ORAL at 20:59

## 2018-05-11 RX ADMIN — METOPROLOL TARTRATE 25 MG: 25 TABLET, FILM COATED ORAL at 20:57

## 2018-05-11 RX ADMIN — ATORVASTATIN CALCIUM 20 MG: 10 TABLET, FILM COATED ORAL at 20:57

## 2018-05-11 RX ADMIN — FINASTERIDE 5 MG: 5 TABLET, FILM COATED ORAL at 08:36

## 2018-05-11 RX ADMIN — ACETAMINOPHEN 650 MG: 325 TABLET ORAL at 13:22

## 2018-05-11 RX ADMIN — ACETAMINOPHEN 650 MG: 325 TABLET ORAL at 08:36

## 2018-05-11 RX ADMIN — ASPIRIN 325 MG: 325 TABLET, DELAYED RELEASE ORAL at 08:36

## 2018-05-11 NOTE — PLAN OF CARE
Problem: Patient Care Overview  Goal: Plan of Care/Patient Progress Review  Outcome: Therapy, progress toward functional goals as expected  SLP: pt seen for meal, trialed NDD3 sandwich, excessive chewing time (up to several minutes per bite) and one time took more food while still had bolus in mouth.  Coughed one time after drink nectar. ?if aspiration and if food in mouth contributed. Did better with NDD2 diet. Educated dtr on diet, sx difficulty.  Cued pt small bites and sips, swallow couple times PRN, make sure food swallowed before taking sip. In pm session after oral cares, gave pt ice chips, and few sips water. He did have delayed cough one time, appeared to be after ice chips, but may have also contributed by prior swallows by cup. Does not appear safe to advance to all thin fluids.  Also will continue on NDD2 diet for now.

## 2018-05-11 NOTE — PLAN OF CARE
Problem: Patient Care Overview  Goal: Plan of Care/Patient Progress Review  FOCUS/GOAL  Bowel management, Nutrition/Feeding/Swallowing precautions, Medical management and Reinforcement of self-care/ADL    ASSESSMENT, INTERVENTIONS AND CONTINUING PLAN FOR GOAL:  Patient is alert/oriented, is very [place] and responds best using the pocket talker.  Patient is oriented to self and place but disoriented to time and has some poor safety awareness at times, checking in frequently as patient does not consistently advocate for needs.  Patient denies any pain, is able to take medications without issues.  Patient had incontinent stool this am before OT session and again afterwards, per OT patient's brief was soiled and some on the bed as well and needed assist with clothing and linens.  Patient has not had any more incontinence this shift, bliss cares done and denies discomfort at site, replaced bliss anchor as it had come undone.  Patient still DD2 with nectar and needs encouragement to get adequate fluid intake.  Per PT patient was very sleepy for his PT session, was able to get patient to participate but needed max prompting and even still reported feeling tired.  BP has been stable on this shift, O2 is stable as well on RA, continue to monitor patient for fatigue, NISREEN pereira is aware of patient's status, continue with POC.

## 2018-05-11 NOTE — PLAN OF CARE
Problem: Goal/Outcome  Goal: Goal Outcome Summary  Received call back and release from Dora (Ph. [phone]) at [facility] Faxed referral info requested so they can determine [facility] placement.     DEBBY Navarro, [facility]  - Coverage for Acute Rehabilitation Center  Pager: [phone]  [email or web address]     NO LETTER

## 2018-05-11 NOTE — PROGRESS NOTES
"  [facility]   Acute Rehabilitation Unit  Daily progress note    interval history  Angelito Castellanos was seen this afternoon, appeared to be sleeping upon entry, woke to voice denied pain, n/v/, ab pain, fevers, diarrhea, reported appetite good, drinking ok, and closed eyes to go back to sleep.  Seen later working with therapy with left leg drag seeming more than before continues to appear fatigued.  Will continue to monitor, per RN and OT had large amount of stool this am- will continue to monitor.     PT: Pt with declining performance today, needing physical assist to advance LLE during gait. Please continue to monitor status.     OT: Pt incontinent of BM 2x during session and unaware. Pt req'd min A with toilet transfer with FWW and total assistance with toilet hygiene d/t incontinence. Pt continues to requires step by step cues for safety with maneuvering FWW with ambulation, and difficulty advancing LLE.      medications    acetaminophen  650 mg Oral TID     [START ON 5/14/2018] apixaban ANTICOAGULANT  5 mg Oral BID     aspirin  325 mg Oral Daily     atorvastatin  20 mg Oral Daily     diclofenac  1 patch Transdermal BID    And     diclofenac   Transdermal Q8H    And     diclofenac   Transdermal BID     finasteride  5 mg Oral Daily     metoprolol tartrate  25 mg Oral BID     polyethylene glycol  17 g Oral Daily     psyllium  1 packet Oral Daily     tamsulosin  0.4 mg Oral QPM        bisacodyl, lidocaine 2 %, polyethylene glycol, senna-docusate     physical exam  /64 (BP Location: Left arm)  Pulse 61  Temp 96.6  F (35.9  C) (Oral)  Resp 16  Ht 1.798 m (5' 10.8\")  Wt 71.6 kg (157 lb 12.8 oz)  SpO2 96%  BMI 22.13 kg/m2     Gen: NAD   Pulm: clear breath sounds b/l   CV: RRR  Abd: soft non tender, non distended   Ext: warm dry without edema, no tenderness at calves  Neuro/MSK: seen walking in das with PT with left foot drag    labs  No new today     Rehabilitation - " continue comprehensive acute inpatient rehabilitation program with multidisciplinary approach including therapies, rehab nursing, and physiatry following. See interval history for updates.      assessment and plan    Mr. Angelito Castellanos is a  89 year old man with a history of chronic a-fib s/p PPM, BPH, Reflux, and HTN who presented to [facility] 4/29  with slurred speech, left facial droop, and left sided weakness admitted to  ARU 5/3 for ongoing rehabilitation and medical management.       Acute ischemic stroke with large vessel occlusion- presented with left hemiparesis, dysarthria, dysphagia.  CT Head 4/30: Evolving ischemic infarcts in the right and left middle cerebral artery territories.Diffuse cerebral volume loss and cerebral white matter changes consistent with chronic small vessel ischemic disease  -continue DD1 with nectar. 05/08/18 now on DD2 with nectar thick.   Continue asa- then start apixiban 5/14  -continue lipitor 20 mg daily (started by neurology)  -continue PT/OT/SLP  -follow up with neurology ([facility])  - HTN, A-Fib management as below    Chronic A-fib- continue asa 325 then in 2 weeks (through 5/13)  start apixiban 5/14 per neurology recs.  PPM in place.   -continue metoprolol 25 mg bid    HTN- BP stable 120s-150s  -continue metoprolol    Urinary Retention- 5/4 UA with + hematuria, + LE, suspected UTI received 3 days cipro with UCx neg for pathogen.   Both patient and his daughter reported urinary obstructive symptom prior to admission including urge incontinence, dribbling, straining and weak stream  -continue bliss (placed 5/5)  and catheter cares-   -continue flomax & finasteride  -f/u with urology team as outpatient    -plan for tov Monday 5/14.    Constipation- reportedly without BM for several days, no n/v/, no ab pain, passing gas then with increased bowel meds 5/10 and 5/11 with reportedly excessive BM 5/11.  -monitor.   -continue fiber  -dc scheduled miralax  -prn senokot & miralax as  well as supp    Lottie Alarcon PA-C  Physical Medicine & Rehabilitation

## 2018-05-11 NOTE — PLAN OF CARE
Problem: Goal/Outcome  Goal: Goal Outcome Summary  Outcome: No Change  Alert and oriented. Patient is [place], but able to communicate well. Rivas patent and draining. No BM this shift. Denies pain/discomfort. Bed alarm on for safety. BP monitored overnight. No additional concerns. Will continue to monitor.

## 2018-05-11 NOTE — PLAN OF CARE
Problem: Patient Care Overview  Goal: Plan of Care/Patient Progress Review  OT: Pt incontinent of BM 2x during session and unaware. Pt req'd min A with toilet transfer with FWW and total assistance with toilet hygiene d/t incontinence. Pt continues to requires step by step cues for safety with maneuvering FWW with ambulation, and difficulty advancing LLE.   -10 minutes d/t BM incontinence.

## 2018-05-11 NOTE — PROGRESS NOTES
"  [facility]   Acute Rehabilitation Unit  Daily progress note    interval history  Angelito Castellanos was seen and examined at bedside. Wearing pocket talker for duration of visit.  Reported abdominal pain and pain around his anus due to constipation. Refused PT this am. Ordered supp. No other issues.     Team rounds held today; please see separate document in Plan of Care tab for full details. Briefly, he has made some progress since admission over the past week. Cognitive deficits and poor safety awareness interfering. Goal is to be mod I with mobility and ADLs but will require 24/7 for safety. Will remove bliss for another TOV before discharge. Plan for a care conference next wekk; family is exploring [facility] options close to home.           medications    acetaminophen  650 mg Oral TID     [START ON 5/14/2018] apixaban ANTICOAGULANT  5 mg Oral BID     aspirin  325 mg Oral Daily     atorvastatin  20 mg Oral Daily     diclofenac  1 patch Transdermal BID    And     diclofenac   Transdermal Q8H    And     diclofenac   Transdermal BID     finasteride  5 mg Oral Daily     metoprolol tartrate  25 mg Oral BID     polyethylene glycol  17 g Oral Daily     psyllium  1 packet Oral Daily     tamsulosin  0.4 mg Oral QPM        bisacodyl, lidocaine 2 %, polyethylene glycol, senna-docusate     physical exam  /89  Pulse 91  Temp 96.9  F (36.1  C) (Oral)  Resp 18  Ht 1.798 m (5' 10.8\")  Wt 71.6 kg (157 lb 12.8 oz)  SpO2 96%  BMI 22.13 kg/m2     Gen: NAD, sitting in chair   Pulm: clear breath sounds b/l   CV: RRR  Abd: soft non tender   Ext: warm dry without edema, no tenderness at calves  Neuro/MSK: unchanged - [place], mild hemiparesis on the left side.  Alert oriented speech clear, follows commands, R shoulder injury.  RUE 4/5 throughout.  LUE shoulder 4/5, left ef 4-/5, ee 4/5,  4/5, left hip flexor 4-/5, right 4/5.      labs  No new today     Rehabilitation - continue comprehensive acute " inpatient rehabilitation program with multidisciplinary approach including therapies, rehab nursing, and physiatry following. See interval history for updates.      assessment and plan    Mr. Angelito Castellanos is a  89 year old man with a history of chronic a-fib s/p PPM, BPH, Reflux, and HTN who presented to [facility] 4/29  with slurred speech, left facial droop, and left sided weakness admitted to  ARU 5/3 for ongoing rehabilitation and medical management.       Acute ischemic stroke with large vessel occlusion- presented with left hemiparesis, dysarthria, dysphagia.  CT Head 4/30: Evolving ischemic infarcts in the right and left middle cerebral artery territories.Diffuse cerebral volume loss and cerebral white matter changes consistent with chronic small vessel ischemic disease  -continue DD1 with nectar. 05/08/18 now on DD2 with nectar thick.   Continue asa- then start apixiban 5/14  -continue lipitor 20 mg daily (started by neurology)  -continue PT/OT/SLP  -follow up with neurology ([facility])  - HTN, A-Fib management as below    Chronic A-fib- continue asa 325 then in 2 weeks (through 5/13)  start apixiban per neurology recs.  PPM in place.   -continue metoprolol 25 mg bid    HTN- BP stable 120s-150s  -continue metoprolol    Urinary Retention- 5/4 UA with + hematuria, + LE, suspected UTI received 3 days cipro with UCx neg for pathogen.   Both patient and his daughter reported urinary obstructive symptom prior to admission including urge incontinence, dribbling, straining and weak stream  -continue bliss (placed 5/5)  and catheter cares-   -continue flomax & finasteride  -f/u with urology team as outpatient      Constipation- reportedly without BM for several days, no n/v/, no ab pain, passing gas  -continue fiber  -add scheduled miralax  -continue prn senokot & miralax as well as supp     Naima Moya MD  Physical Medicine & Rehabilitation    I spent a total of 30 minutes face-to-face and managing the care of  Angelito Castellanos. Over 50% of my time on the unit was spent counseling the patient and coordinating care. See note for details.

## 2018-05-11 NOTE — PROGRESS NOTES
FOCUS/GOAL  Bowel management, Bladder management, Discharge planning and Safety management    ASSESSMENT, INTERVENTIONS AND CONTINUING PLAN FOR Goal   Pt was incontinent of BM needs total assist with pericare and changing incontinent pad , with bliss catheter intact and patent draining to clear  Yellow urine , total assist with bliss care , took all his medications whole one at a time with apple sauce and nectar thickened fluids , staff has to place it in his mouth using spoon , assisted with repositioning on bed , impaired safety judgement with bed alarm on as fall prevention measures  , per staff assessment with his current performance of ADLS is he will need a 24 hour assistance at discharge , staff to continue to assist pt with safe performance of ADLS and continue to  encourage him to participate with his care .  Elevated BP before the Metropolol dose which was given at hs staff to continue monitoring BP .

## 2018-05-12 PROCEDURE — A9270 NON-COVERED ITEM OR SERVICE: HCPCS | Mod: GY | Performed by: PHYSICAL MEDICINE & REHABILITATION

## 2018-05-12 PROCEDURE — A9270 NON-COVERED ITEM OR SERVICE: HCPCS | Mod: GY | Performed by: PHYSICIAN ASSISTANT

## 2018-05-12 PROCEDURE — 97535 SELF CARE MNGMENT TRAINING: CPT | Mod: GO

## 2018-05-12 PROCEDURE — 92526 ORAL FUNCTION THERAPY: CPT | Mod: GN | Performed by: SPEECH-LANGUAGE PATHOLOGIST

## 2018-05-12 PROCEDURE — 97110 THERAPEUTIC EXERCISES: CPT | Mod: GP | Performed by: STUDENT IN AN ORGANIZED HEALTH CARE EDUCATION/TRAINING PROGRAM

## 2018-05-12 PROCEDURE — 97530 THERAPEUTIC ACTIVITIES: CPT | Mod: GP | Performed by: STUDENT IN AN ORGANIZED HEALTH CARE EDUCATION/TRAINING PROGRAM

## 2018-05-12 PROCEDURE — 97127 ZZHC SP THERAPEUTIC INTERVENTION W/FOCUS ON COGNITIVE FUNCTION,EA 15 MIN: CPT | Mod: GN | Performed by: SPEECH-LANGUAGE PATHOLOGIST

## 2018-05-12 PROCEDURE — 40000225 ZZH STATISTIC SLP WARD VISIT: Performed by: SPEECH-LANGUAGE PATHOLOGIST

## 2018-05-12 PROCEDURE — 12800006 ZZH R&B REHAB

## 2018-05-12 PROCEDURE — 25000132 ZZH RX MED GY IP 250 OP 250 PS 637: Mod: GY | Performed by: PHYSICIAN ASSISTANT

## 2018-05-12 PROCEDURE — 25000132 ZZH RX MED GY IP 250 OP 250 PS 637: Mod: GY | Performed by: PHYSICAL MEDICINE & REHABILITATION

## 2018-05-12 PROCEDURE — 40000193 ZZH STATISTIC PT WARD VISIT: Performed by: STUDENT IN AN ORGANIZED HEALTH CARE EDUCATION/TRAINING PROGRAM

## 2018-05-12 PROCEDURE — 97116 GAIT TRAINING THERAPY: CPT | Mod: GP | Performed by: STUDENT IN AN ORGANIZED HEALTH CARE EDUCATION/TRAINING PROGRAM

## 2018-05-12 PROCEDURE — 40000133 ZZH STATISTIC OT WARD VISIT

## 2018-05-12 RX ADMIN — TAMSULOSIN HYDROCHLORIDE 0.4 MG: 0.4 CAPSULE ORAL at 20:11

## 2018-05-12 RX ADMIN — METOPROLOL TARTRATE 25 MG: 25 TABLET, FILM COATED ORAL at 08:02

## 2018-05-12 RX ADMIN — ATORVASTATIN CALCIUM 20 MG: 10 TABLET, FILM COATED ORAL at 20:11

## 2018-05-12 RX ADMIN — PSYLLIUM HUSK 1 PACKET: 3.4 POWDER ORAL at 09:00

## 2018-05-12 RX ADMIN — ACETAMINOPHEN 650 MG: 325 TABLET ORAL at 20:12

## 2018-05-12 RX ADMIN — METOPROLOL TARTRATE 25 MG: 25 TABLET, FILM COATED ORAL at 20:27

## 2018-05-12 RX ADMIN — FINASTERIDE 5 MG: 5 TABLET, FILM COATED ORAL at 08:02

## 2018-05-12 RX ADMIN — ASPIRIN 325 MG: 325 TABLET, DELAYED RELEASE ORAL at 08:02

## 2018-05-12 NOTE — PLAN OF CARE
Problem: Goal/Outcome  Goal: Goal Outcome Summary  OT: -30 minutes due to therapist's schedule.     -Patient reported he had already dressed but per nursing aide he had not.  Incontinent BM contained in pad but therapist educated patient on the importance of changing the brief and pericares.  Patient agreeable to clean-up.

## 2018-05-12 NOTE — PLAN OF CARE
Problem: Goal/Outcome  Goal: Goal Outcome Summary  Outcome: Improving  Patient ambulated with CGA and walker.    Rivas intact, draining clear yellow urine.  Remains on DD2 diet with nectar liquids.  Encouraged nectar thick liquids to prevent dehydration.  He does not initiate drinking fluids, continue encouraging them.

## 2018-05-12 NOTE — PROGRESS NOTES
[facility], [place]   Physical Medicine and Rehabilitation Daily Note           Assessment and Plan of Care:   Mr. Angelito Castellanos is a 89 year old male with a history of chronic a-fib s/p PPM, BPH, Reflux, and HTN who presented to [facility] 4/29  with slurred speech, left facial droop, and left sided weakness admitted to  ARU 5/3 for ongoing rehabilitation and medical management.     --Vitals stable. No labs today.  --Continue ongoing medical management.  --Continue therapies and plan of care.             Interval history:   Patient seen and examined while in PT this am. He denies fever, chills, CP, SOB, N/V, abdominal pain, new pain or weakness/numbness/tingling. In speech, he trialed NDD2 and NDD3 diets, had difficulty with NDD3. He will get a leg bag for his bliss catheter, as this has been interfering with therapy. Otherwise doing well. No other concerns.             Physical Exam:   VS:   Vitals:    05/11/18 1532 05/11/18 2057 05/12/18 0640 05/12/18 1040   BP: 124/64 123/65 147/74 108/54   BP Location: Left arm  Right arm    Pulse: 61  66 72   Resp: 16  20    Temp: 96.6  F (35.9  C)  97  F (36.1  C)    TempSrc: Oral  Oral    SpO2: 96%  96%    Weight:       Height:         Gen: NAD, frail-appearing  Lungs: breathing unlabored on room air  Abd: soft and non-tender  Ext: no edema in BLE, no calf tenderness  MSK/neuro: Diffuse muscle wasting          Data:   Scheduled meds    acetaminophen  650 mg Oral TID     [START ON 5/14/2018] apixaban ANTICOAGULANT  5 mg Oral BID     aspirin  325 mg Oral Daily     atorvastatin  20 mg Oral Daily     diclofenac  1 patch Transdermal BID    And     diclofenac   Transdermal Q8H    And     diclofenac   Transdermal BID     finasteride  5 mg Oral Daily     metoprolol tartrate  25 mg Oral BID     psyllium  1 packet Oral Daily     tamsulosin  0.4 mg Oral QPM       PRN meds:  bisacodyl, lidocaine 2 %, polyethylene glycol, senna-docusate      Idania FIELD  St[facility]  Physical Medicine and Rehabilitation     I spent a total of 25 minutes face-to-face and managing the care of Angelito Castellanos. Over 50% of my time on the unit was spent counseling the patient and coordinating care. Please see note for details.

## 2018-05-12 NOTE — PLAN OF CARE
Problem: Goal/Outcome  Goal: Goal Outcome Summary  Outcome: No Change  FOCUS/GOAL  Bladder management and Pain management    ASSESSMENT, INTERVENTIONS AND CONTINUING PLAN FOR GOAL:  Pt slept well, did not complain of pain or discomfort. Pt was not impulsive. Rivas catheter draining well. Staff assisted with bed mobility. NSG to continue monitoring.

## 2018-05-12 NOTE — PLAN OF CARE
Problem: Goal/Outcome  Goal: Goal Outcome Summary  Outcome: No Change  Patient is alert, but forgetful. [place], use voice enhancer device for better communication. Denies pain/discomfort. Up with assistance of one with gait belt/walker. Continue on bliss for over the weekend. Bliss is patent, draining. Episode bowel incontinence.  Requires total assist with incontinence care. Continue on  DD2 Nectar thickened, poor appetite. Encouraged  fluids intake. Bed/chair alarm for safety. Call light within reach. Continue to monitor for safety

## 2018-05-12 NOTE — PLAN OF CARE
Problem: Patient Care Overview  Goal: Plan of Care/Patient Progress Review  SLP: pt seen for meal NDD2 and NDD3 diet again, much difficulty and extended time to chew sandwich on NDD3 .  NDD2 easier, but needing reminders to only take one bite at a time, and don't take sip/another bite until mouth emptied/swallow 1-2 times at least.  Pt did cough one time on nectar liquid ,?if aggravated by food stilll in mouth.  Dtr present, educated her regarding diet and possibility he may still be on nectar at discharge. Gave handout regarding purchasing thickened liquids and or how to prepare in advance for pt.      Pm: pt able to recall and follow basic directions for card based task, only minimal reminders/cueing.

## 2018-05-13 PROCEDURE — 25000132 ZZH RX MED GY IP 250 OP 250 PS 637: Mod: GY | Performed by: PHYSICIAN ASSISTANT

## 2018-05-13 PROCEDURE — 92526 ORAL FUNCTION THERAPY: CPT | Mod: GN | Performed by: SPEECH-LANGUAGE PATHOLOGIST

## 2018-05-13 PROCEDURE — A9270 NON-COVERED ITEM OR SERVICE: HCPCS | Mod: GY | Performed by: PHYSICIAN ASSISTANT

## 2018-05-13 PROCEDURE — 12800006 ZZH R&B REHAB

## 2018-05-13 PROCEDURE — 97535 SELF CARE MNGMENT TRAINING: CPT | Mod: GO

## 2018-05-13 PROCEDURE — 40000133 ZZH STATISTIC OT WARD VISIT

## 2018-05-13 PROCEDURE — 40000225 ZZH STATISTIC SLP WARD VISIT: Performed by: SPEECH-LANGUAGE PATHOLOGIST

## 2018-05-13 PROCEDURE — 97116 GAIT TRAINING THERAPY: CPT | Mod: GP | Performed by: STUDENT IN AN ORGANIZED HEALTH CARE EDUCATION/TRAINING PROGRAM

## 2018-05-13 PROCEDURE — 25000132 ZZH RX MED GY IP 250 OP 250 PS 637: Mod: GY | Performed by: PHYSICAL MEDICINE & REHABILITATION

## 2018-05-13 PROCEDURE — 97112 NEUROMUSCULAR REEDUCATION: CPT | Mod: GP | Performed by: STUDENT IN AN ORGANIZED HEALTH CARE EDUCATION/TRAINING PROGRAM

## 2018-05-13 PROCEDURE — 40000193 ZZH STATISTIC PT WARD VISIT: Performed by: STUDENT IN AN ORGANIZED HEALTH CARE EDUCATION/TRAINING PROGRAM

## 2018-05-13 PROCEDURE — A9270 NON-COVERED ITEM OR SERVICE: HCPCS | Mod: GY | Performed by: PHYSICAL MEDICINE & REHABILITATION

## 2018-05-13 PROCEDURE — 97530 THERAPEUTIC ACTIVITIES: CPT | Mod: GP | Performed by: STUDENT IN AN ORGANIZED HEALTH CARE EDUCATION/TRAINING PROGRAM

## 2018-05-13 PROCEDURE — 97110 THERAPEUTIC EXERCISES: CPT | Mod: GP | Performed by: STUDENT IN AN ORGANIZED HEALTH CARE EDUCATION/TRAINING PROGRAM

## 2018-05-13 PROCEDURE — 97127 ZZHC SP THERAPEUTIC INTERVENTION W/FOCUS ON COGNITIVE FUNCTION,EA 15 MIN: CPT | Mod: GN | Performed by: SPEECH-LANGUAGE PATHOLOGIST

## 2018-05-13 RX ADMIN — METOPROLOL TARTRATE 25 MG: 25 TABLET, FILM COATED ORAL at 08:18

## 2018-05-13 RX ADMIN — ATORVASTATIN CALCIUM 20 MG: 10 TABLET, FILM COATED ORAL at 21:30

## 2018-05-13 RX ADMIN — PSYLLIUM HUSK 1 PACKET: 3.4 POWDER ORAL at 08:18

## 2018-05-13 RX ADMIN — METOPROLOL TARTRATE 25 MG: 25 TABLET, FILM COATED ORAL at 21:30

## 2018-05-13 RX ADMIN — TAMSULOSIN HYDROCHLORIDE 0.4 MG: 0.4 CAPSULE ORAL at 21:30

## 2018-05-13 RX ADMIN — ASPIRIN 325 MG: 325 TABLET, DELAYED RELEASE ORAL at 08:18

## 2018-05-13 RX ADMIN — FINASTERIDE 5 MG: 5 TABLET, FILM COATED ORAL at 11:05

## 2018-05-13 NOTE — PLAN OF CARE
Problem: Goal/Outcome  Goal: Goal Outcome Summary  Outcome: No Change  Pt alert. Pt denied pain, SOB or other concerns overnight. Pt bliss was positional, patent and draining. No BM this overnight. Pt slept majority of shift. Continue plan of care.

## 2018-05-13 NOTE — PLAN OF CARE
Problem: Goal/Outcome  Goal: Goal Outcome Summary  Outcome: No Change  FOCUS/GOAL  Bladder management and Pain management    ASSESSMENT, INTERVENTIONS AND CONTINUING PLAN FOR GOAL:  Pt denied pain. Used amplifier for communication due to [place], effective.   Rivas catheter in place for retention. Rivas care done. Ate 100% of supper, nectar thickened liquids encouraged.   Pt did oral care with set up assist. Bed alarm on.

## 2018-05-13 NOTE — PLAN OF CARE
"Problem: Patient Care Overview  Goal: Plan of Care/Patient Progress Review  SLP: pt coughed on 3/3 sips water by cup - probable aspiration.  Doing ok with NDD2, Also noting occasional cough with nectar.  Likely d/c on NDD2/nectar liquids.  Discussed basic memory/organizational strategies.  Pt states he forgot his pills at home, even with pillbox.  Also reports \"forget what my daughter tells me sometimes.\"  Suspect pt had cognitive deficits prior - but not certain to what extent, as he did drive, but dtrs took care of bills, appointments.        "

## 2018-05-13 NOTE — PLAN OF CARE
Problem: Goal/Outcome  Goal: Goal Outcome Summary  OT: Improved bed mobility today with CGA and no losses of balance.  Morning ADLs with SBA-Min A but continued Min-Mod A for shoes.  Steady balance while standing sink side with SBA to brush his teeth.

## 2018-05-13 NOTE — PLAN OF CARE
Problem: Goal/Outcome  Goal: Goal Outcome Summary  Outcome: Improving  Patient was incontinent of BM and was unaware.  Needed a cue to go to the bathroom to change.  Needed assist with gerardo care and managing clothing.  Ambulates with CGA and walker.    Nectar thick liquids encouraged throughout the day to prevent dehydration.

## 2018-05-13 NOTE — PLAN OF CARE
Problem: Patient Care Overview  Goal: Plan of Care/Patient Progress Review  Pt motivated, participating well. CGA-MIN A for transfers, CGA-MIN A for gait with FWW, distance limited d/t fatigue and weakness. Cognitive deficits also limiting independence with functional mobility. Maintain bed and chair alarms. Encourage fluids.

## 2018-05-14 PROCEDURE — 40000133 ZZH STATISTIC OT WARD VISIT: Performed by: OCCUPATIONAL THERAPIST

## 2018-05-14 PROCEDURE — 97110 THERAPEUTIC EXERCISES: CPT | Mod: GO | Performed by: OCCUPATIONAL THERAPIST

## 2018-05-14 PROCEDURE — 25000132 ZZH RX MED GY IP 250 OP 250 PS 637: Mod: GY | Performed by: PHYSICIAN ASSISTANT

## 2018-05-14 PROCEDURE — 40000225 ZZH STATISTIC SLP WARD VISIT: Performed by: SPEECH-LANGUAGE PATHOLOGIST

## 2018-05-14 PROCEDURE — 25000132 ZZH RX MED GY IP 250 OP 250 PS 637: Mod: GY | Performed by: PHYSICAL MEDICINE & REHABILITATION

## 2018-05-14 PROCEDURE — A9270 NON-COVERED ITEM OR SERVICE: HCPCS | Mod: GY | Performed by: PHYSICIAN ASSISTANT

## 2018-05-14 PROCEDURE — 25000125 ZZHC RX 250: Performed by: PAIN MEDICINE

## 2018-05-14 PROCEDURE — A9270 NON-COVERED ITEM OR SERVICE: HCPCS | Mod: GY | Performed by: PHYSICAL MEDICINE & REHABILITATION

## 2018-05-14 PROCEDURE — 40000133 ZZH STATISTIC OT WARD VISIT

## 2018-05-14 PROCEDURE — 92526 ORAL FUNCTION THERAPY: CPT | Mod: GN | Performed by: SPEECH-LANGUAGE PATHOLOGIST

## 2018-05-14 PROCEDURE — 12800006 ZZH R&B REHAB

## 2018-05-14 PROCEDURE — 97112 NEUROMUSCULAR REEDUCATION: CPT | Mod: GP | Performed by: STUDENT IN AN ORGANIZED HEALTH CARE EDUCATION/TRAINING PROGRAM

## 2018-05-14 PROCEDURE — 97127 ZZHC SP THERAPEUTIC INTERVENTION W/FOCUS ON COGNITIVE FUNCTION,EA 15 MIN: CPT | Mod: GN | Performed by: SPEECH-LANGUAGE PATHOLOGIST

## 2018-05-14 PROCEDURE — 97110 THERAPEUTIC EXERCISES: CPT | Mod: GP | Performed by: STUDENT IN AN ORGANIZED HEALTH CARE EDUCATION/TRAINING PROGRAM

## 2018-05-14 PROCEDURE — 97530 THERAPEUTIC ACTIVITIES: CPT | Mod: GP | Performed by: STUDENT IN AN ORGANIZED HEALTH CARE EDUCATION/TRAINING PROGRAM

## 2018-05-14 PROCEDURE — 40000193 ZZH STATISTIC PT WARD VISIT: Performed by: STUDENT IN AN ORGANIZED HEALTH CARE EDUCATION/TRAINING PROGRAM

## 2018-05-14 PROCEDURE — 97530 THERAPEUTIC ACTIVITIES: CPT | Mod: GO

## 2018-05-14 PROCEDURE — 97116 GAIT TRAINING THERAPY: CPT | Mod: GP | Performed by: STUDENT IN AN ORGANIZED HEALTH CARE EDUCATION/TRAINING PROGRAM

## 2018-05-14 RX ORDER — ACETAMINOPHEN 325 MG/1
650 TABLET ORAL EVERY 6 HOURS PRN
Status: DISCONTINUED | OUTPATIENT
Start: 2018-05-14 | End: 2018-05-22 | Stop reason: HOSPADM

## 2018-05-14 RX ADMIN — PSYLLIUM HUSK 1 PACKET: 3.4 POWDER ORAL at 08:14

## 2018-05-14 RX ADMIN — APIXABAN 5 MG: 2.5 TABLET, FILM COATED ORAL at 20:12

## 2018-05-14 RX ADMIN — LIDOCAINE HYDROCHLORIDE 5 ML: 20 JELLY TOPICAL at 22:07

## 2018-05-14 RX ADMIN — TAMSULOSIN HYDROCHLORIDE 0.4 MG: 0.4 CAPSULE ORAL at 20:12

## 2018-05-14 RX ADMIN — APIXABAN 5 MG: 2.5 TABLET, FILM COATED ORAL at 08:13

## 2018-05-14 RX ADMIN — ATORVASTATIN CALCIUM 20 MG: 10 TABLET, FILM COATED ORAL at 20:12

## 2018-05-14 RX ADMIN — FINASTERIDE 5 MG: 5 TABLET, FILM COATED ORAL at 08:13

## 2018-05-14 RX ADMIN — METOPROLOL TARTRATE 25 MG: 25 TABLET, FILM COATED ORAL at 08:13

## 2018-05-14 RX ADMIN — METOPROLOL TARTRATE 25 MG: 25 TABLET, FILM COATED ORAL at 20:12

## 2018-05-14 NOTE — PLAN OF CARE
Problem: Goal/Outcome  Goal: Goal Outcome Summary  Social Work Services Progress Note    Hospital Day: 12  Collaborated with:  Pt's daughter - Shae     Data:  Received request from overnight RN to call pt's daughter to discuss d/c plans. Per discussion with therapists, pt may need TCU at d/c as he is needing more assistance than he would be able to get in an assisted living environment. Called pt's daughter, Shae, to follow up. She expressed understanding that pt may need TCU instead of assisted living. She expressed disappointment over this and noted how much she misses her dad, but recognizes that this likely the best course at this time. Provided empathic listening and support for her disappointment. Discussed different TCU options, as well as transitioning from TCU to penitentiary if appropriate. Noted that SW can start referrals to facilities, which doesn't commit them to anything and then we can see how things go to determine if this will be needed and where he could go. Pt's daughter was agreeable to this plan. She asked that SW start with referrals to [facility], [facility] and [facility]. Directed daughter to Medicare.gov, as she said she had computer access, to look at additional facility options. Provided SW contact info to call if she has additional facilities she'd like SW to send referrals. Pt's daughter thanked SW for the call.     SW to follow up with referrals as discussed.     Intervention:  Family support, d/c planning     Assessment:  Pt's daughter was very pleasant and receptive to SW, seemed appreciative of the call and support.    Plan:    Anticipated Disposition:  TBD    Barriers to d/c plan:  Progression with therapies, safe d/c plan     Follow Up:  SW will continue to follow, support and assist with ongoing social service and discharge planning needs.     Gita Gray, DEBBY, SW  [facility]  - Coverage for Acute Rehabilitation Center  Pager:  [phone]  [email or web address]     NO LETTER

## 2018-05-14 NOTE — PLAN OF CARE
Problem: Patient Care Overview  Goal: Plan of Care/Patient Progress Review  SLP present for meal assessment of NDD2 solids and nectar thick liquids. Throughout meal pt demonstrated impulsive feeding, taking multiple consecutive bites. Bites ranged from small to medium size. Min oral residue noted throughout meal. Cough noted x2 following solids, possible baseline feeding cough, no s/sx of overt aspiration. Safest and least restrictive diet is ND2 with nectar thick liquids currently. Speech to continue attempting NDD3 upgrade, as well as cognitive linguistic therapy. Pt benefitting from speech treatments.

## 2018-05-14 NOTE — PLAN OF CARE
Problem: Individualization  Goal: Patient Individualization  Outcome: No Change  FOCUS/GOAL  Bladder management and Mobility    ASSESSMENT, INTERVENTIONS AND CONTINUING PLAN FOR GOAL:  Pt sleeps well at night, and is independent with bed mobility. Pt has a bliss catheter for bladder management. Pt's bliss will be removed at 6:00 AM today for trial of voiding. Pt has bladder scan and SIC orders in chart. Urinal placed at bedside for pt, and pt instructed to call for assistance if needed.

## 2018-05-14 NOTE — PLAN OF CARE
Problem: Goal/Outcome  Goal: Goal Outcome Summary  FOCUS/GOAL  Bladder management    ASSESSMENT, INTERVENTIONS AND CONTINUING PLAN FOR GOAL:  Unfortunately Angelito was only able to void 10 ml,  ml and straight cathed for 400 ml he prefers Urogel which was used.

## 2018-05-14 NOTE — PLAN OF CARE
Problem: Patient Care Overview  Goal: Plan of Care/Patient Progress Review  Outcome: No Change  Pt is alert but disoriented to time at baseline. Very [place] and requires speaker to speak loudly and slowly. Has pocket talker available but declined to use. VSS and WNL. Denies pain. Has L sided weakness d/t CVA. Denies numbness/tingling. Denies any chest pain or SOB. Lungs CTA. BS present and active x 4. Tolerating diet with no n/v. Remains on DD2 with nectar thick liquids. No concerns for aspiration. Rivas remains in place. Plan is for trial void tomorrow. Family visited today and daughter, Shae expressed concerns with possible dc Thursday since care conference is only on Wednesday. Stated she was waiting for care conference to find out if [facility] placement would be a high enough level of care for pt. Charge nurse left  for SW to call Shae tomorrow to discuss. Up with assist x 1. Has call light in reach and bed alarm on. Continue with plan of care.

## 2018-05-14 NOTE — PROGRESS NOTES
"  [facility]   Acute Rehabilitation Unit  Daily progress note    interval history  Angelito Castellanos was seen sitting up in bed watching tv notes he is doing well.  Reports \"tube from penis taken out\", discussed trial of voiding and goal for urination without retention \"well that would be nice\".  Denies issues with sob, fevers, pain, dizziness.  Reports left foot drag is not from stroke but more chronic in nature.  Asks about royal wedding and president's visit to [place].  Denies other concerns.      SLP:Completed a word retrieval task that also targeted flexible thinking- idingword that 3 other words are describing- pt at 5/8 accuracy with this task - needing min cues at times. Started a mod level deductive reasoning puzzle but did not have time to finish it- will continue tomorrow but needing min cues to aid in attention to detail. Compelted a visual memory task- recall of a pictured scene- pt able to recall 10/10 details.    PT:  Pt motivated, participating well. CGA-MIN A for transfers, CGA-MIN A for gait with FWW, distance limited d/t fatigue and weakness. Cognitive deficits also limiting independence with functional mobility. Maintain bed and chair alarms. Encourage fluids.    OT: Improved bed mobility today with CGA and no losses of balance.  Morning ADLs with SBA-Min A but continued Min-Mod A for shoes.  Steady balance while standing sink side with SBA to brush his teeth.     medications    apixaban ANTICOAGULANT  5 mg Oral BID     atorvastatin  20 mg Oral Daily     diclofenac   Transdermal Q8H    And     diclofenac   Transdermal BID     finasteride  5 mg Oral Daily     lidocaine         metoprolol tartrate  25 mg Oral BID     psyllium  1 packet Oral Daily     tamsulosin  0.4 mg Oral QPM        acetaminophen, bisacodyl, diclofenac **AND** diclofenac **AND** diclofenac, lidocaine 2 %, polyethylene glycol, senna-docusate     physical exam  /67  Pulse 80  Temp 96.9  F " "(36.1  C) (Oral)  Resp 18  Ht 1.798 m (5' 10.8\")  Wt 71.6 kg (157 lb 12.8 oz)  SpO2 97%  BMI 22.13 kg/m2     Gen: NAD   Pulm: clear breath sounds b/l   CV: RRR  Abd: soft non tender, non distended   Ext: warm dry without edema, no tenderness at calves  Neuro/MSK: seen walking in das with PT with left foot drag, Left hip flexor 4-/5, ke/kf 4/5, pf 4/5, upper extremities 5/5.  RLE Hf 4/5, distally 5/5.     labs  No new today     Rehabilitation - continue comprehensive acute inpatient rehabilitation program with multidisciplinary approach including therapies, rehab nursing, and physiatry following. See interval history for updates.      assessment and plan    Mr. Angelito Castellanos is a  89 year old man with a history of chronic a-fib s/p PPM, BPH, Reflux, and HTN who presented to [facility] 4/29  with slurred speech, left facial droop, and left sided weakness admitted to  ARU 5/3 for ongoing rehabilitation and medical management.       Acute ischemic stroke with large vessel occlusion- presented with left hemiparesis, dysarthria, dysphagia.  CT Head 4/30: Evolving ischemic infarcts in the right and left middle cerebral artery territories.Diffuse cerebral volume loss and cerebral white matter changes consistent with chronic small vessel ischemic disease  -continue DD1 with nectar. 05/08/18 now on DD2 with nectar thick.   Continue asa- then start apixiban 5/14  -continue lipitor 20 mg daily (started by neurology)  -continue PT/OT/SLP  -follow up with neurology ([facility])  - HTN, A-Fib management as below    Chronic A-fib- continue asa 325 then in 2 weeks (through 5/13)  start apixiban 5/14 per neurology recs.  PPM in place.   -continue metoprolol 25 mg bid    HTN- BP stable 120s-150s  -continue metoprolol    Urinary Retention- 5/4 UA with + hematuria, + LE, suspected UTI received 3 days cipro with UCx neg for pathogen.   Both patient and his daughter reported urinary obstructive symptom prior to admission including urge " incontinence, dribbling, straining and weak stream  -continue bliss (placed 5/5)  and catheter cares-   -continue flomax & finasteride  -f/u with urology team as outpatient    -plan for tov today 5/14.    Constipation- reportedly without BM for several days, no n/v/, no ab pain, passing gas then with increased bowel meds 5/10 and 5/11 with reportedly excessive BM 5/11 & 5/12  -monitor.   -continue fiber  -prn senokot & miralax as well as supp    Lottie Alarcon PA-C  Physical Medicine & Rehabilitation

## 2018-05-14 NOTE — PLAN OF CARE
Problem: Patient Care Overview  Goal: Plan of Care/Patient Progress Review  Completed a word retrieval task that also targeted flexible thinking- idingword that 3 other words are describing- pt at 5/8 accuracy with this task - needing min cues at times. Started a mod level deductive reasoning puzzle but did not have time to finish it- will continue tomorrow but needing min cues to aid in attention to detail. Compelted a visual memory task- recall of a pictured scene- pt able to recall 10/10 details.

## 2018-05-15 PROCEDURE — 97535 SELF CARE MNGMENT TRAINING: CPT | Mod: GO

## 2018-05-15 PROCEDURE — A9270 NON-COVERED ITEM OR SERVICE: HCPCS | Mod: GY | Performed by: PHYSICIAN ASSISTANT

## 2018-05-15 PROCEDURE — 40000225 ZZH STATISTIC SLP WARD VISIT: Performed by: SPEECH-LANGUAGE PATHOLOGIST

## 2018-05-15 PROCEDURE — 25000132 ZZH RX MED GY IP 250 OP 250 PS 637: Mod: GY | Performed by: PHYSICIAN ASSISTANT

## 2018-05-15 PROCEDURE — 25000132 ZZH RX MED GY IP 250 OP 250 PS 637: Mod: GY | Performed by: PHYSICAL MEDICINE & REHABILITATION

## 2018-05-15 PROCEDURE — 40000133 ZZH STATISTIC OT WARD VISIT

## 2018-05-15 PROCEDURE — 25000125 ZZHC RX 250

## 2018-05-15 PROCEDURE — 92526 ORAL FUNCTION THERAPY: CPT | Mod: GN | Performed by: SPEECH-LANGUAGE PATHOLOGIST

## 2018-05-15 PROCEDURE — 40000225 ZZH STATISTIC SLP WARD VISIT

## 2018-05-15 PROCEDURE — 25000125 ZZHC RX 250: Performed by: PAIN MEDICINE

## 2018-05-15 PROCEDURE — 97530 THERAPEUTIC ACTIVITIES: CPT | Mod: GP

## 2018-05-15 PROCEDURE — 40000193 ZZH STATISTIC PT WARD VISIT

## 2018-05-15 PROCEDURE — 97127 ZZHC SP THERAPEUTIC INTERVENTION W/FOCUS ON COGNITIVE FUNCTION,EA 15 MIN: CPT | Mod: GN

## 2018-05-15 PROCEDURE — 12800006 ZZH R&B REHAB

## 2018-05-15 PROCEDURE — 97116 GAIT TRAINING THERAPY: CPT | Mod: GP

## 2018-05-15 PROCEDURE — 97750 PHYSICAL PERFORMANCE TEST: CPT | Mod: GP

## 2018-05-15 PROCEDURE — A9270 NON-COVERED ITEM OR SERVICE: HCPCS | Mod: GY | Performed by: PHYSICAL MEDICINE & REHABILITATION

## 2018-05-15 RX ORDER — AMOXICILLIN 250 MG
1 CAPSULE ORAL AT BEDTIME
Status: DISCONTINUED | OUTPATIENT
Start: 2018-05-15 | End: 2018-05-17

## 2018-05-15 RX ADMIN — FINASTERIDE 5 MG: 5 TABLET, FILM COATED ORAL at 08:32

## 2018-05-15 RX ADMIN — METOPROLOL TARTRATE 25 MG: 25 TABLET, FILM COATED ORAL at 08:31

## 2018-05-15 RX ADMIN — PSYLLIUM HUSK 1 PACKET: 3.4 POWDER ORAL at 08:32

## 2018-05-15 RX ADMIN — LIDOCAINE HYDROCHLORIDE 5 ML: 20 JELLY TOPICAL at 21:57

## 2018-05-15 RX ADMIN — LIDOCAINE HYDROCHLORIDE 5 ML: 20 JELLY TOPICAL at 06:51

## 2018-05-15 RX ADMIN — METOPROLOL TARTRATE 25 MG: 25 TABLET, FILM COATED ORAL at 21:00

## 2018-05-15 RX ADMIN — TAMSULOSIN HYDROCHLORIDE 0.4 MG: 0.4 CAPSULE ORAL at 21:00

## 2018-05-15 RX ADMIN — ATORVASTATIN CALCIUM 20 MG: 10 TABLET, FILM COATED ORAL at 20:56

## 2018-05-15 RX ADMIN — SENNOSIDES AND DOCUSATE SODIUM 1 TABLET: 8.6; 5 TABLET ORAL at 21:10

## 2018-05-15 RX ADMIN — APIXABAN 5 MG: 2.5 TABLET, FILM COATED ORAL at 08:31

## 2018-05-15 RX ADMIN — SENNOSIDES AND DOCUSATE SODIUM 1 TABLET: 8.6; 5 TABLET ORAL at 20:56

## 2018-05-15 RX ADMIN — APIXABAN 5 MG: 2.5 TABLET, FILM COATED ORAL at 20:55

## 2018-05-15 NOTE — PLAN OF CARE
Problem: Patient Care Overview  Goal: Plan of Care/Patient Progress Review  SLP present for dysphagia treatment. SLP present for second half of NDD2 solids and nectar thick liquids meal. Pt occasionally noted to take large bites, consecutive bites. Required mod verbal cues to reduce bite size and feeding rate. No s/sx of aspiration this meal. Pt consumed 80% of his meal, with complete independence. SLP reviewed oral motor exercises. Pt demonstrated inability to complete Michelle despite max verbal, visual and tactile cues. Pt was able to independently complete effortful swallow following mod verbal and visual cues. Pt was able to complete yawn exercise following mod verbal cues.

## 2018-05-15 NOTE — PROGRESS NOTES
Reassessed PASS with patient scoring 26/36, decreased from 32/36 from 5/5. Unclear where discrepancy is as previous testing not documented. Patient continues to need vc and reminders for safety with ambulation and transfers; using FWW, vc for increased step length and toe clearance on the left side. Ambulated outside on uneven surfaces today with CGA.    Postural Assessment Scale for Stroke    Maintaining a posture  1. Sitting without support: 3  2. Standing with support: 3  3. Standing without support: 3   4. Standing on nonparetic le  5. Standing on paretic le    Changing posture  6. Supine to affected side lateral: 3  7. Supine to nonaffected side lateral: 3  8. Supine to sitting up on the edge of the table: 2  9. Sitting on the edge of the table to supine: 3  10. Sitting to standing up: 3  11. Standing up to sitting down: 3  12. Standing, picking up a pencil from the floor: 2    Total score:     26/36

## 2018-05-15 NOTE — PLAN OF CARE
5/15/18 Stroke Education Note     The following information has been reviewed with the patient and family:     1. Warning signs of stroke  2. Calling 911 if having warning signs of stroke  3. All modifiable risk factors: Hypertension, CAD, atrial fib, diabetes, hypercholesterolemia, smoking, substance abuse, diet, physical inactivity, obesity, sleep apnea.  4. Patient's risk factors for stroke which include:   HTN,Pre Diabetes,A.fib,Pacemaker  5. Follow-up plan for after discharge  6. Medications which include:ASA,Eliquis,Lopressor     In addition, the [facility] Stroke Class Handout and Understanding Stroke have been given to the patient and family.     Learner's response to risk factors / lifestyle modification education: Activation     HTN: Per [facility] hand out:diet,medication,checking his BP at home with his BP machine QD,record the results,bring to HCP appointments,report too high or too low BP.     Pre diabetes:Per [facility] handout :diet     A.fib:covered Eliquis per booklet.     Patient and family attentive.Patient able to answer signs of a stroke and calling 911.Patient very [place].Family stated the diet changes they were not going to reinforce with the patient at his age.Patient and family agreeable to medications,able to state signs and of a stroke,calling 911 and patient's risk factors.Continue to reinforce information.Also see education flow sheet.

## 2018-05-15 NOTE — PLAN OF CARE
Problem: Goal/Outcome  Goal: Goal Outcome Summary  FOCUS/GOAL  Bowel management, Bladder management, Pain management, Mobility, Cognition/Memory/Judgment/Problem solving and Safety management    ASSESSMENT, INTERVENTIONS AND CONTINUING PLAN FOR GOAL:  Pt is alert and oriented x 3. Pt was incontinent of urine x 1; bladder scan was 457. Pt voided 100 cc of clear yellow urine with a PVR of 309. Will have pt attempt to void again closer to morning. No BM overnight. Denied pain or discomfort. Pt independent with bed mobility. Bed alarm on for safety. Call light within reach. Will continue with POC.

## 2018-05-15 NOTE — PLAN OF CARE
Problem: Individualization  Goal: Patient Individualization  Outcome: Improving  Alert and oriented to self, place and time, incontinent of bladder contained in his brief per therapy staff, patient scanned for 235. Patient ate good at breakfast and lunch, will continue POC.

## 2018-05-15 NOTE — PLAN OF CARE
Problem: Goal/Outcome  Goal: Goal Outcome Summary  FOCUS/GOAL  Bowel management, Bladder management, Pain management and Mobility    ASSESSMENT, INTERVENTIONS AND CONTINUING PLAN FOR GOAL:    Patient is A&O, disoriented to time, incontinent of BM this shift, not able to void. Bladder scan was 460 ml, ISC for 475 ml. Urogel was used prior to ISC to numb the area. Patient had incontinent BM while sitting on the side of the bed trying to urinate in the urinal. Patient denied pain. Good appetite for dinner. Transfers CGA with a walker. Continue POC.

## 2018-05-15 NOTE — PROGRESS NOTES
"  [facility]   Acute Rehabilitation Unit  Daily progress note    interval history  Angelito Castellanos was seen sitting up at bedside table, reports he is feeling ok, eating all right and trying to drink more.  Reports he tries to go to the bathroom but has been unable does not have sensation to void, reports slight pain at times when he does pass a little urine but reports quick resolution, no fevers, ab pain, no n/v/, feeling a bit constipated again will try senokot x 1 at night.  Denies other concerns today.      medications    apixaban ANTICOAGULANT  5 mg Oral BID     atorvastatin  20 mg Oral Daily     diclofenac   Transdermal Q8H    And     diclofenac   Transdermal BID     finasteride  5 mg Oral Daily     metoprolol tartrate  25 mg Oral BID     psyllium  1 packet Oral Daily     tamsulosin  0.4 mg Oral QPM        acetaminophen, bisacodyl, diclofenac **AND** diclofenac **AND** diclofenac, lidocaine 2 %, polyethylene glycol, senna-docusate     physical exam  /67 (BP Location: Right arm)  Pulse 103  Temp 96.1  F (35.6  C) (Oral)  Resp 14  Ht 1.798 m (5' 10.8\")  Wt 71.6 kg (157 lb 12.8 oz)  SpO2 98%  BMI 22.13 kg/m2     Gen: NAD   Pulm: clear breath sounds b/l   CV: irreg  Abd: soft non tender, non distended   Ext: warm dry without edema, no tenderness at calves  Neuro/MSK: [place] alert speech clear follows commands up ambulating with walker with therapy    labs  Now new labs     Rehabilitation - continue comprehensive acute inpatient rehabilitation program with multidisciplinary approach including therapies, rehab nursing, and physiatry following. See interval history for updates.      assessment and plan    Mr. Angelito Castellanos is a  89 year old man with a history of chronic a-fib s/p PPM, BPH, Reflux, and HTN who presented to [facility] 4/29  with slurred speech, left facial droop, and left sided weakness admitted to  ARU 5/3 for ongoing rehabilitation and medical management. "       Acute ischemic stroke with large vessel occlusion- presented with left hemiparesis, dysarthria, dysphagia.  CT Head 4/30: Evolving ischemic infarcts in the right and left middle cerebral artery territories.Diffuse cerebral volume loss and cerebral white matter changes consistent with chronic small vessel ischemic disease  -continue DD1 with nectar. 05/08/18 now on DD2 with nectar thick.   Continue asa- then start apixiban 5/14  -continue lipitor 20 mg daily (started by neurology)  -continue PT/OT/SLP  -follow up with neurology ([facility])  - HTN, A-Fib management as below    Chronic A-fib- received asa through 5/13 started apixiban 5/14 per neurology recs.  PPM in place.   -continue metoprolol 25 mg bid  -continue apixiban.    HTN- some variability overall stable.  -continue metoprolol    Urinary Retention- 5/4 UA with + hematuria, + LE, suspected UTI received 3 days cipro with UCx neg for pathogen.   Both patient and his daughter reported urinary obstructive symptom prior to admission including urge incontinence, dribbling, straining and weak stream  -continue bliss (placed 5/5)  and catheter cares-   -continue flomax & finasteride  -f/u with urology team as outpatient    - bliss removed 5/14 with some ongoing retention, intermittent small volume uop and urinary incontinence- continue to monitor    Constipation- reportedly without BM for several days, no n/v/, no ab pain, passing gas then with increased bowel meds 5/10 and 5/11 with reportedly excessive BM 5/11 & 5/12  -monitor.   -continue fiber  -prn senokot & miralax as well as supp    Lottie Alarcon PA-C  Physical Medicine & Rehabilitation      Case discussed with Dr. Moya

## 2018-05-16 PROCEDURE — A9270 NON-COVERED ITEM OR SERVICE: HCPCS | Mod: GY | Performed by: PHYSICIAN ASSISTANT

## 2018-05-16 PROCEDURE — 25000132 ZZH RX MED GY IP 250 OP 250 PS 637: Mod: GY | Performed by: PHYSICAL MEDICINE & REHABILITATION

## 2018-05-16 PROCEDURE — 12800006 ZZH R&B REHAB

## 2018-05-16 PROCEDURE — 97535 SELF CARE MNGMENT TRAINING: CPT | Mod: GO | Performed by: OCCUPATIONAL THERAPIST

## 2018-05-16 PROCEDURE — 97116 GAIT TRAINING THERAPY: CPT | Mod: GP

## 2018-05-16 PROCEDURE — 40000187 ZZH STATISTIC PATIENT MED CONFERENCE < 30 MIN: Performed by: SPEECH-LANGUAGE PATHOLOGIST

## 2018-05-16 PROCEDURE — 40000187 ZZH STATISTIC PATIENT MED CONFERENCE < 30 MIN: Performed by: OCCUPATIONAL THERAPIST

## 2018-05-16 PROCEDURE — 97127 ZZHC SP THERAPEUTIC INTERVENTION W/FOCUS ON COGNITIVE FUNCTION,EA 15 MIN: CPT | Mod: GN | Performed by: SPEECH-LANGUAGE PATHOLOGIST

## 2018-05-16 PROCEDURE — 25000132 ZZH RX MED GY IP 250 OP 250 PS 637: Mod: GY | Performed by: PHYSICIAN ASSISTANT

## 2018-05-16 PROCEDURE — 40000225 ZZH STATISTIC SLP WARD VISIT: Performed by: SPEECH-LANGUAGE PATHOLOGIST

## 2018-05-16 PROCEDURE — 40000133 ZZH STATISTIC OT WARD VISIT: Performed by: OCCUPATIONAL THERAPIST

## 2018-05-16 PROCEDURE — A9270 NON-COVERED ITEM OR SERVICE: HCPCS | Mod: GY | Performed by: PHYSICAL MEDICINE & REHABILITATION

## 2018-05-16 PROCEDURE — 92526 ORAL FUNCTION THERAPY: CPT | Mod: GN | Performed by: SPEECH-LANGUAGE PATHOLOGIST

## 2018-05-16 PROCEDURE — 97112 NEUROMUSCULAR REEDUCATION: CPT | Mod: GP

## 2018-05-16 PROCEDURE — 40000193 ZZH STATISTIC PT WARD VISIT

## 2018-05-16 PROCEDURE — 99366 TEAM CONF W/PAT BY HC PROF: CPT

## 2018-05-16 PROCEDURE — 97530 THERAPEUTIC ACTIVITIES: CPT | Mod: GO | Performed by: OCCUPATIONAL THERAPIST

## 2018-05-16 PROCEDURE — 97110 THERAPEUTIC EXERCISES: CPT | Mod: GP

## 2018-05-16 RX ADMIN — TAMSULOSIN HYDROCHLORIDE 0.4 MG: 0.4 CAPSULE ORAL at 21:51

## 2018-05-16 RX ADMIN — ATORVASTATIN CALCIUM 20 MG: 10 TABLET, FILM COATED ORAL at 21:46

## 2018-05-16 RX ADMIN — APIXABAN 5 MG: 2.5 TABLET, FILM COATED ORAL at 21:46

## 2018-05-16 RX ADMIN — APIXABAN 5 MG: 2.5 TABLET, FILM COATED ORAL at 10:05

## 2018-05-16 RX ADMIN — METOPROLOL TARTRATE 25 MG: 25 TABLET, FILM COATED ORAL at 10:05

## 2018-05-16 RX ADMIN — SENNOSIDES AND DOCUSATE SODIUM 1 TABLET: 8.6; 5 TABLET ORAL at 21:46

## 2018-05-16 RX ADMIN — FINASTERIDE 5 MG: 5 TABLET, FILM COATED ORAL at 10:05

## 2018-05-16 RX ADMIN — PSYLLIUM HUSK 1 PACKET: 3.4 POWDER ORAL at 10:05

## 2018-05-16 RX ADMIN — METOPROLOL TARTRATE 25 MG: 25 TABLET, FILM COATED ORAL at 21:49

## 2018-05-16 NOTE — PLAN OF CARE
Problem: Goal/Outcome  Goal: Goal Outcome Summary  Social Work Services Progress Note    Hospital Day: 14  Collaborated with:  Pt/family, therapy/care team     Data:  Met with pt and family at end of care conference to discuss TCU again as a group. Pt/family would like referrals to go to the following:   - [facility]   - [facility]  - [facility]     Encouraged them to tour facilities and contact SW with any additional options if they come up, otherwise will start with the options identified. They're hopeful to find something close to family so they can visit often. They discussed this as short-term plan to help pt become stronger before going to assisted living/back to daughter's home.     [facility] above.     Intervention:  Discharge planning, pt/family support     Assessment:  Pt/family were pleasant and receptive to SW. They were emotional regarding pt not being able to come home right away, but were able to articulate why TCU would be best option for pt. Pt seemed indifferent but agreeable, stating he didn't want to be a burden. Family was very supportive and continue to be good advocates for pt.    Plan:    Anticipated Disposition:  Facility:  TCU - referrals pending.    Barriers to d/c plan:  TCU acceptance/bed availability     Follow Up:  SW will continue to follow, support and assist with ongoing social service and discharge planning needs.     DEBBY Navarro, [facility]  - Coverage for Acute Rehabilitation Center  Pager: [phone]  [email or web address]     NO LETTER

## 2018-05-16 NOTE — PLAN OF CARE
Problem: Patient Care Overview  Goal: Plan of Care/Patient Progress Review  Patient A&O x3, denied CP, lightheadedness, dizziness, numbness, tingling and SOB, up in chair, pt. is on bed and chair alarm, AS1 with walker and gait belt, walked to the bath room and void 100 cc around 8 pm with PVR of 325 cc, offered urinal and encouraged to double void but pt. unable to void, straigh cath the patient around 10 pm for 450 cc for a bladder scan of 425 cc. Self repositioned and turned in bed, able to make needs known, will continue to monitor patient as POC.

## 2018-05-16 NOTE — CARE CONFERENCE
Acute Rehab Care Conference/Team Rounds      Type: Patient Conference    Present: Patient - Dr. Naima Eaton SW Lauren Fahey, RN Russ Rodriguez, SLP Jennifer Kennedy, OT Kacy Castano, PT Daniela Cruz      Discharge Barriers/Treatment/Education    Rehab Diagnosis: stroke     Active Medical Co-morbidities/Prognosis: HTN, A fib, urinary retention     Safety: Bed alarm on for safety. Pt is [place], uses amplifier. Uses call light appropriately.     Pain: Negligible pain reported.     Medications, Skin, Tubes/Lines: Medications taken with nectar thickened liquids. Skin intact. No tubes or lines.     Swallowing/Nutrition: Pt remains on DD2 with nectar thick liquids-- have not successfully been able to advance to DD3 yet due to difficulty with masticating DD3 textures; further with thin liquid trials- still coughs intermittently. Pt had a VFSS completed while at the hospital on 5/2-- planning to repeat VFSS tomorrow to see if improvement at all with thin liquids if trial strategies/positioning as well as to determine if pharyngeal retention with various solid textures has lessened. Pt is also on the free water program for ice chips: ok to have 5-6 ice chips per hour following oral cares and only with supervision and in between meals.     Bowel/Bladder: Incontinent of bladder with retention. Bladder scanning with occasional intermittent straight catheterization. Occasional fecal incontinence. Metamucil and Senna given daily, additional Senna, Miralax and Dulcolax suppository available.      Psychosocial: Pt is a 89-year-old male who lives in a house with his daughter, Shae, in [place], [place]. He has another adult daughter, Ethan, who lives locally and is supportive and involved, as well. Pt was relatively independent prior to hospitalization/rehab stay. Pt/family's ultimate goal is for pt to return home with continued family support, but they had been researching residential options for short-term stay prior to returning  home. Pt may need more care/supervision than this at d/c, so also exploring TCU option with family. Team working towards a safe d/c plan.     ADLs/IADLs: Pt's progress is slow d/t cognitive deficits and poor safety awareness. Pt requires SBA with G/H tasks standing at EOS with FWW. Pt requires CGA with gathering clothing from closet with FWW with mod v/c's for safety with use of FWW. Pt requires SBA with UBD seated with verbal cues to initiate and min A with managing buttons. Pt requires min A with donning/doffing pants d/t difficulty threading LLE. Pt requires up to min A with donning/doffing socks and shoes seated. Pt is incontinent of BM and bladder. Pt requires CGA with toilet transfer with FWW with verbal and tactile cues for safe approach, and up to max A with toilet hygiene d/t incontinence. Pt scored 14/30 on the MOCA indicating cognitive deficits. Pt will likely need 24/7 supervision upon D/C; D/C to TCU vs half-way with increased services. Pt would benefit from ongoing OT services.     Mobility: Pt with slow progress with therapies due to cognitive deficits. Patient needs cues for safety with most mobility tasks, remains impulsive with mobility. Has demonstrated increased indep with bed mobility and transfers needing SBA to mod I depending on fatigue. Ambulates with CGA - Rosario and FWW; occasionally needs assistance advancing L LE during gait. Patient would benefit from 24 hour supervision at discharge d/t cognition and safety concerns, at risk for falls, limited carryover for learning during therapy sessions. DC to TCU vs [facility] with increased services. Patient would benefit from continued skilled PT for progression of balance, improved indep with functional mobility.    Cognition/Language: Pt has mild- moderate deficits in short term memory, flexible attention and with problem solving/reasoning for more complex tasks. Pt recently has made some progress with recall of directions and during tx tasks - visual memory  task 2 days ago at 100%-- but memory is variable pending task/situation and is not always consistent. With problem solving reasoning- more basic tasks- able to complete with standby assist but with more complex tasks- needing at least moderate amount of assist. Pt would need 24 hours supervision at d/c due to safety concerns with cognition. Recommend d/c to TCU for ongoing tx to address cognition and swallowing.     Community Re-Entry: has supportive family     Transportation: no drive at discharge     Decision maker: self    Plan of Care and goals reviewed and updated.    Discharge Plan/Recommendations    Fall Precautions: continue    Overall plan for the patient: Angelito has made some progress since admission but still requires 24/7 supervision and frequent cues for safety. Discussed active medical issues, current management and f/u plans. Recommended TCU to maximize his functional gains. Pt and family were agreeable; SW will help with placement.       Utilization Review and Continued Stay Justification    Medical Necessity Criteria:    For any criteria that is not met, please document reason and plan for discharge, transfer, or modification of plan of care to address.    Requires intensive rehabilitation program to treat functional deficits?: Yes    Requires 3x per week or greater involvement of rehabilitation physician to oversee rehabilitation program?: Yes    Requires rehabilitation nursing interventions?: Yes    Patient is making functional progress?: Yes    There is a potential for additional functional progress? Yes    Patient is participating in therapy 3 hours per day a minimum of 5 days per week or 15 hours per week in 7 day period?:Yes    Has discharge needs that require coordinated discharge planning approach?:Yes        Final Physician Sign off    Statement of Approval: I agree with all the recommendations detailed in this document.      Patient Goals    OT target date for goal attainment: 05/17/18  OT  Frequency: daily for 60-90 minutes  OT goal: hygiene/grooming: supervision/stand-by assist, while standing  OT goal: upper body dressing: Modified independent, including set-up/clothing retrieval  OT goal: lower body dressing: Supervision/stand-by assist, including set-up/clothing retrieval  OT goal: upper body bathing: Supervision/stand-by assist  OT goal: lower body bathing: Supervision/stand-by assist, using adaptive equipment  OT goal: toilet transfer/toileting: Supervision/stand-by assist, toilet transfer, cleaning and garment management  OT goal: meal preparation: Supervision/stand-by assist, with simple meal preparation, ambulatory level  OT goal: home management: Supervision/stand-by assist, with light demand household tasks, ambulatory level  OT goal: cognitive: Patient/caregiver will verbalize understanding of cognitive assessment results/recommendations as needed for safe discharge planning  OT goal 1: Pt will safely complete bathtub transfers with S/SBA utilizing appropriate AE/DME.  OT goal 2: Pt will safely complete LUE HEP with supervision to increase strengthening and dexterity needed for ADLs and IADLs.     PT target date for goal attainment: 05/17/18  PT Frequency: 60-75 minutes daily  PT goal: bed mobility: Modified independent, Rolling, Bridging, Supine to/from sit (using bedrail)  PT goal: transfers: Modified independent, Bed to/from chair, Assistive device, Sit to/from stand (using FWW for home negotiation)  PT goal: gait: 50 feet, Supervision/stand-by assist, Greater than 200 feet, Minimal assist (SBA fo home, CGA for limited community)  PT goal: stairs: 7 stairs, Minimal assist, Rail on right (for home access)  PT goal: perform aerobic activity with stable cardiovascular response: 10 minutes, continuous activity (for cardio health, endurance, OMNI no > 7/10)     PT goal 1: using handout perform strength HEP SBA for improved functional mobility  PT Goal 2: using handout perform balance HEP  with SBA for improved funcitonal mobility  PT Goal 3: Folowing education pt & family will state 3 or > fall prevention strategies to demo knowledge of how to reduce fall risk  PT Goal 4: perform floor transfer w/ furniture assist MIN A to demo safe floor recovery         SLP target date for goal attainment: 05/17/18  SLP Frequency: daily  SLP Swallow Goal:  Safely tolerate diet without signs/symptoms of aspiration: regular diet, thin liquids, with use of swallow precautions, with assistance/supervision  SLP goal 1: Patient will complete complex level verbal expression tasks with 90% accuracy to express complex though processes.   SLP goal 2: Pt will utilize compensatory attention and memory strategies to complete basic tomod level short term recall and flexible/alternating attention tasks with 90% accuracy  SLP goal 3: Pt will complete basic tomod level problem solving/ reasnoning tasks with 90% accuracy     Nursing goals   Patient Goal:  Pain Management: pt will self advocate for pain management. will request for pain interventions as pain occurs.    Patient/Family Goal: Medication Management: Pt will participate in and pass Map by discharge.     Goal: Mobility: Pt will be independent  in all aspects of mobility by discharge.

## 2018-05-16 NOTE — PLAN OF CARE
Problem: Goal/Outcome  Goal: Goal Outcome Summary  FOCUS/GOAL  Bowel management, Bladder management, Pain management, Mobility and Safety management    ASSESSMENT, INTERVENTIONS AND CONTINUING PLAN FOR GOAL:  Pt is alert and oriented. No urinary output yet this shift, bladder scans were 94 ml and 300 ml. No BM overnight. Denied pain or discomfort. Pt independent with bed mobility. Bed alarm on for safety. Pt slept well between cares. Will continue with POC.

## 2018-05-16 NOTE — PLAN OF CARE
Problem: Individualization  Goal: Patient Individualization  Outcome: No Change  Alert and oriented to self,place and time, patient is also forgetful. Continent of bladder on the toilet, PVR for 240. Incontinent of bowels contained in his brief, need assist with gerardo-cares and clothing management.Had a cc today, plan  is to dc to a TCU, will continue POC

## 2018-05-16 NOTE — PLAN OF CARE
Problem: Patient Care Overview  Goal: Plan of Care/Patient Progress Review   Pt seen for swallow tx- breakfast on current DD2 with nectar thick liquids- pt still needing increased time for mastication and oral prep on DD2 but tolerating without overt s/s of aspiration; no s/s of aspirtion on nectar thick liquids- pt using strategies of small bites, eating slowly and alternating solids and liquids. Completed oral pharyngeal excs- yawn, tongu base, effortful swallow and attempted Michelle- but still difficult for pt to fully execute this- continue with current DD2 diet with nectar thick liquids. VFSS scheduled tomorrow to determine if improvement in swallowing enough to further advance diet        Completed a task targeting flexible thinking and wrod finding- namign 10 words associated with given word- pt able to name 4/10 even with cues. Compelted a task involving use of rehearsal and visualziation to aid in recall- recal lfo 5 words that pt heard read 2-3 times and then answering a questin related to 1 of the words 4/7-- used pocket talker with pt to aid in hearing with this task.

## 2018-05-17 ENCOUNTER — APPOINTMENT (OUTPATIENT)
Dept: GENERAL RADIOLOGY | Facility: CLINIC | Age: 83
DRG: 057 | End: 2018-05-17
Attending: PHYSICAL MEDICINE & REHABILITATION
Payer: MEDICARE

## 2018-05-17 PROCEDURE — 12800006 ZZH R&B REHAB

## 2018-05-17 PROCEDURE — A9270 NON-COVERED ITEM OR SERVICE: HCPCS | Mod: GY | Performed by: PHYSICIAN ASSISTANT

## 2018-05-17 PROCEDURE — 92611 MOTION FLUOROSCOPY/SWALLOW: CPT | Mod: GN | Performed by: SPEECH-LANGUAGE PATHOLOGIST

## 2018-05-17 PROCEDURE — 74230 X-RAY XM SWLNG FUNCJ C+: CPT

## 2018-05-17 PROCEDURE — 25000132 ZZH RX MED GY IP 250 OP 250 PS 637: Mod: GY | Performed by: PHYSICIAN ASSISTANT

## 2018-05-17 PROCEDURE — 97116 GAIT TRAINING THERAPY: CPT | Mod: GP | Performed by: REHABILITATION PRACTITIONER

## 2018-05-17 PROCEDURE — 40000193 ZZH STATISTIC PT WARD VISIT: Performed by: REHABILITATION PRACTITIONER

## 2018-05-17 PROCEDURE — 92526 ORAL FUNCTION THERAPY: CPT | Mod: GN

## 2018-05-17 PROCEDURE — 40000225 ZZH STATISTIC SLP WARD VISIT

## 2018-05-17 PROCEDURE — 97535 SELF CARE MNGMENT TRAINING: CPT | Mod: GO | Performed by: OCCUPATIONAL THERAPIST

## 2018-05-17 PROCEDURE — 40000193 ZZH STATISTIC PT WARD VISIT

## 2018-05-17 PROCEDURE — A9270 NON-COVERED ITEM OR SERVICE: HCPCS | Mod: GY | Performed by: PHYSICAL MEDICINE & REHABILITATION

## 2018-05-17 PROCEDURE — 25000132 ZZH RX MED GY IP 250 OP 250 PS 637: Mod: GY | Performed by: PHYSICAL MEDICINE & REHABILITATION

## 2018-05-17 PROCEDURE — 97110 THERAPEUTIC EXERCISES: CPT | Mod: GP

## 2018-05-17 PROCEDURE — 40000133 ZZH STATISTIC OT WARD VISIT: Performed by: OCCUPATIONAL THERAPIST

## 2018-05-17 PROCEDURE — 40000225 ZZH STATISTIC SLP WARD VISIT: Performed by: SPEECH-LANGUAGE PATHOLOGIST

## 2018-05-17 PROCEDURE — 97530 THERAPEUTIC ACTIVITIES: CPT | Mod: GP | Performed by: REHABILITATION PRACTITIONER

## 2018-05-17 RX ORDER — FINASTERIDE 5 MG/1
5 TABLET, FILM COATED ORAL DAILY
Qty: 30 TABLET | DISCHARGE
Start: 2018-05-18 | End: 2020-01-01

## 2018-05-17 RX ORDER — METOPROLOL TARTRATE 25 MG/1
12.5 TABLET, FILM COATED ORAL 2 TIMES DAILY
Qty: 60 TABLET | DISCHARGE
Start: 2018-05-17

## 2018-05-17 RX ORDER — ACETAMINOPHEN 325 MG/1
650 TABLET ORAL EVERY 6 HOURS PRN
Qty: 100 TABLET | DISCHARGE
Start: 2018-05-17 | End: 2018-06-20

## 2018-05-17 RX ORDER — TAMSULOSIN HYDROCHLORIDE 0.4 MG/1
0.4 CAPSULE ORAL EVERY EVENING
Status: DISCONTINUED | OUTPATIENT
Start: 2018-05-18 | End: 2018-05-22 | Stop reason: HOSPADM

## 2018-05-17 RX ORDER — AMOXICILLIN 250 MG
1-2 CAPSULE ORAL 2 TIMES DAILY PRN
Qty: 100 TABLET | DISCHARGE
Start: 2018-05-17 | End: 2018-05-30 | Stop reason: DRUGHIGH

## 2018-05-17 RX ADMIN — TAMSULOSIN HYDROCHLORIDE 0.4 MG: 0.4 CAPSULE ORAL at 20:55

## 2018-05-17 RX ADMIN — APIXABAN 5 MG: 2.5 TABLET, FILM COATED ORAL at 20:55

## 2018-05-17 RX ADMIN — FINASTERIDE 5 MG: 5 TABLET, FILM COATED ORAL at 08:52

## 2018-05-17 RX ADMIN — ATORVASTATIN CALCIUM 20 MG: 10 TABLET, FILM COATED ORAL at 20:55

## 2018-05-17 RX ADMIN — Medication 12.5 MG: at 20:55

## 2018-05-17 RX ADMIN — PSYLLIUM HUSK 1 PACKET: 3.4 POWDER ORAL at 08:52

## 2018-05-17 RX ADMIN — APIXABAN 5 MG: 2.5 TABLET, FILM COATED ORAL at 08:52

## 2018-05-17 NOTE — DISCHARGE INSTRUCTIONS
Follow up:     --TCU provider within 2 weeks    -- Urology- follow up urinary retention    Address            [facility]                           [address]                           [place] [address]  Phone              [phone]  Or  Address  [facility], Urology                          [facility]                          [address]; [address]                          [place] [address]  Phone   [phone]    -- Neurology- follow up stroke with [facility] stroke service- within 8 weeks.   Please call [phone] to schedule your appointment with Neurology.    Address  [facility], Neurology                          [address]; [address]                          [place] [address]  Phone   [phone]    -----------------------------------------------  To reduce the risk of subsequent stroke there are several important factors including optimal management of anticoagulants, blood pressure, cholesterol, diabetes and smoking abstinence.    Anticoagulation:  You are on Apixiban.     Blood Pressure:  Keeping your blood pressures less than 130/80 has been shown to reduce risk of recurrent stroke. You are currently on metoprolol to help control your blood pressure. Several lifestyle modifications have been associated with blood pressure reduction and are an important part of a comprehensive plan. These include: weight loss (if over-weight); a diet low in salt and cholesterol and rich in fruits and vegetables; regular aerobic physical activity and limited alcohol consumption.    Diabetes:  You do not have diabetes though it is important to continue monitoring for this in the future with your primary provider.    Diet:  Currently  you're on a diet modified for your swallow.  Diet can significantly affect your levels and should be part of your plan. Please see additional information from dietitian about this.    Cholesterol:  Traditional target levels for LDL  "cholesterol or \"bad cholesterol\" is less than 130 however once you have had a stroke, your target LDL level is now less than 70. Additional recommendations such as increasing your HDL or \"good\" cholesterol and lowering your triglyceride level can also be important.    Your most recent lipid panel was   Lab Results   Component Value Date    CHOL 174 04/30/2018     Lab Results   Component Value Date    HDL 35 04/30/2018     Lab Results   Component Value Date     04/30/2018     Lab Results   Component Value Date    TRIG 116 04/30/2018     This should be followed up in 2-3 months with your primary provider.    Smoking:  Continue to remain tobacco free!    "

## 2018-05-17 NOTE — PLAN OF CARE
Acute Rehab Care Conference/Team Rounds      Type: Team Rounds    Present: Dr Naima Moya, Lottie Alarcon PA, Gita Gray SW, Mine Orellana OT, Vangie Tubbs PT, Zaid Segura SLP, Idania Greenberg RN, Laura Rea Dietician, Tati Mathew , Ariel Butler          Discharge Barriers/Treatment/Education    Rehab Diagnosis: stroke      Active Medical Co-morbidities/Prognosis: HTN, A fib, urinary retention     Safety: Needs assist of two with transfers. Uses call light to make his needs known    Pain: Denies pain or discomfort.    Medications, Skin, Tubes/Lines: Will need assistance with medication management when discharges. No skin issues or lines.     Swallowing/Nutrition: Completed VFSS to see if pt can further advance diet. Based on eval:pt's swallow functin has improved- now demosntrating a mild- moderate oral pharygneal dysphagia characterized by the following: premature pharygneal entry to the level of the pyriform sinus' with thin liquid trials by teaspoon and by cup rim ( and also a mild delayed swallow initiation)- but despitre this- pt controls the thin liquids material well at the pharygneal level- there was no aspiration or penetration with thin liquid trials. Attempte to trial thin by straw too but pt was not able to slurp upr the liquid into the straw. Pt does have some mild pooling of thin liquids in the valleculae and pyriform sinus following the swallow but with additional swallows this clears to trace amounts. With semi-solid consistency- pt is noted to have prologned mastication and oral prep with the semi- solids and some difficulty with A-P propulsion of bolus- noting some lingual pumping. Pt initially is noted to have mild to moderate pharygneal retention both in the valleculae and pyriform sinus' but his clears to more mild amounts when f/u with extra swallows and when f/u with sips of thin liquids. Recommending that pt continue with DD2 solids but that  pt's liquids be advanced to thin liquids. Pt needs to continue to use safe swallow strategies of upright for all po, small bites/sips, alternate solids and liquids, multiple swallows with both liquids and solids, pace self- eat slowly- 1 bite at a time. SLP to continue to follow for instructin on safe swallow strategies, diet tolerance, and readiness for further diet advancment as well as oral pharyngeal excs. SLP will need continued sptx following d/c to TCU as pt's diet remains below baseline    Bowel/Bladder: Incontinent of bowel and bladder, continues working on times toileting during the day, condom catheter at night.    Psychosocial: Pt is a 89-year-old male who lives in a house with his daughter, Shae, in [place], [place]. He has another adult daughter, Ethan, who lives locally, as well. Both are very supportive and involved, as are his adult grandchildren. Pt/family ultimately would like for pt to return home, but understand that a TCU stay would be beneficial to continue with therapies. SW working with family to secure TCU placement.      ADLs/IADLs: Pt's progress is slow d/t cognitive deficits and poor safety awareness. Pt requires SBA with G/H tasks standing at EOS with FWW. Pt requires CGA with gathering clothing from closet with FWW with mod v/c's for safety with use of FWW. Pt requires SBA with UBD seated with verbal cues to initiate and min A with managing buttons. Pt requires min A with donning/doffing pants d/t difficulty threading LLE. Pt requires up to min A with donning/doffing socks and shoes seated. Pt is incontinent of BM and bladder. Pt requires CGA with toilet transfer with FWW with verbal and tactile cues for safe approach, and up to max A with toilet hygiene d/t incontinence. Pt scored 14/30 on the MOCA indicating cognitive deficits. Recommended 24/7 supervision upon D/C; pt going to D/C to TCU with ongoing OT services.     Mobility:  Pt with slow progress with therapies due to cognitive  deficits. Patient needs cues for safety with most mobility tasks, remains impulsive with mobility. Has demonstrated increased indep with bed mobility and transfers needing SBA to mod I depending on fatigue. Ambulates with CGA - Ros[place] and FWW; occasionally needs assistance advancing L LE during gait. Patient would benefit from 24 hour supervision at discharge d/t cognition and safety concerns, at risk for falls, limited carryover for learning during therapy sessions. DC to TCU vs [facility] with increased services. Patient would benefit from continued skilled PT for progression of balance, improved indep with functional mobility in a TCU setting.     Cognition/Language: Pt has mild- moderate deficits in short term memory, flexible attention and with problem solving/reasoning for more complex tasks. Pt recently has made some progress with recall of directions and during tx tasks - visual memory task 2 days ago at 100%-- but memory is variable pending task/situation and is not always consistent. With problem solving reasoning- more basic tasks- able to complete with standby assist but with more complex tasks- needing at least moderate amount of assist. Pt would need 24 hours supervision at d/c due to safety concerns with cognition. Recommend d/c to TCU for ongoing tx to address cognition and swallowing.     Community Re-Entry: w/c based w/ assist    Transportation: Not a  due to cognition, care transfer w/ assist but not a true barrier.    Decision maker: self    Plan of Care and goals reviewed and updated.    Discharge Plan/Recommendations    Fall Precautions: continue    Overall plan for the patient: plan was discussed at care conference yesterday. Angelito is making slow progress but need more rehab prior to dc home. Cognitive deficits seems to be main barrier for independence and affecting his safety when walking and resulting in limited carry over of techniques. Waiting TCU placement.       Utilization Review and  Continued Stay Justification    Medical Necessity Criteria:    For any criteria that is not met, please document reason and plan for discharge, transfer, or modification of plan of care to address.    Requires intensive rehabilitation program to treat functional deficits?: Yes    Requires 3x per week or greater involvement of rehabilitation physician to oversee rehabilitation program?: Yes    Requires rehabilitation nursing interventions?: Yes    Patient is making functional progress?: Yes    There is a potential for additional functional progress? Yes    Patient is participating in therapy 3 hours per day a minimum of 5 days per week or 15 hours per week in 7 day period?:Yes    Has discharge needs that require coordinated discharge planning approach?:Yes      Final Physician Sign off    Statement of Approval: I agree with all the recommendations detailed in this document.      Patient Goals    1. Patient Goal: Social Work Focus: Patient and family will be involved in discharge planning.  2. Patient Goal: Social Work Focus: Patient will receive appropriate services/resources to meet their needs.      OT target date for goal attainment: 05/17/18  OT Frequency: daily for 60-90 minutes  OT goal: hygiene/grooming: supervision/stand-by assist, while standing  OT goal: upper body dressing: Modified independent, including set-up/clothing retrieval  OT goal: lower body dressing: Supervision/stand-by assist, including set-up/clothing retrieval  OT goal: upper body bathing: Supervision/stand-by assist  OT goal: lower body bathing: Supervision/stand-by assist, using adaptive equipment  OT goal: toilet transfer/toileting: Supervision/stand-by assist, toilet transfer, cleaning and garment management  OT goal: meal preparation: Supervision/stand-by assist, with simple meal preparation, ambulatory level  OT goal: home management: Supervision/stand-by assist, with light demand household tasks, ambulatory level  OT goal: cognitive:  Patient/caregiver will verbalize understanding of cognitive assessment results/recommendations as needed for safe discharge planning  OT goal 1: Pt will safely complete bathtub transfers with S/SBA utilizing appropriate AE/DME.  OT goal 2: Pt will safely complete LUE HEP with supervision to increase strengthening and dexterity needed for ADLs and IADLs.      PT target date for goal attainment: 05/17/18  PT Frequency: 60-75 minutes daily  PT goal: bed mobility:  (goal met)  PT goal: transfers: Modified independent, Bed to/from chair, Assistive device, Sit to/from stand (using FWW for home negotiation)  PT goal: gait: 50 feet, Supervision/stand-by assist, Greater than 200 feet, Minimal assist (SBA fo home, CGA for limited community)  PT goal: stairs:  (goal met; 12 stairs with CGA Rrail, vc for full foot on step)  PT goal: perform aerobic activity with stable cardiovascular response: 10 minutes, continuous activity (for cardio health, endurance, OMNI no > 7/10)     PT goal 1: using handout perform strength HEP SBA for improved functional mobility  PT Goal 2: using handout perform balance HEP with SBA for improved funcitonal mobility  PT Goal 3: Folowing education pt & family will state 3 or > fall prevention strategies to demo knowledge of how to reduce fall risk  PT Goal 4: perform floor transfer w/ furniture assist MIN A to demo safe floor recovery         SLP target date for goal attainment: 05/17/18  SLP Frequency: daily  SLP Swallow Goal:  Safely tolerate diet without signs/symptoms of aspiration: regular diet, thin liquids, with use of swallow precautions, with assistance/supervision  SLP goal 1: Patient will complete complex level verbal expression tasks with 90% accuracy to express complex though processes.   SLP goal 2: Pt will utilize compensatory attention and memory strategies to complete basic tomod level short term recall and flexible/alternating attention tasks with 90% accuracy  SLP goal 3: Pt will  complete basic tomod level problem solving/ reasnoning tasks with 90% accuracy     Nursing:  Patient Goal:  Pain Management: pt will self advocate for pain management. will request for pain interventions as pain occurs.  Patient/Family Goal: Medication Management: MAP not needed, due to TCU placement plan.  Goal: Mobility: Pt will be independent  in all aspects of mobility by discharge.

## 2018-05-17 NOTE — PROGRESS NOTES
"  [facility]   Acute Rehabilitation Unit  Daily progress note    interval history  Angelito Castellanos was seen during his care conference. Angelito has made some progress since admission but still requires 24/7 supervision and frequent cues for safety. Discussed active medical issues, current management and f/u plans. Recommended TCU to maximize his functional gains. Pt and family were agreeable; SW will help with placement.     Ordered FVSS for tomorrow.   Continue bladder program with timed toileting and double voiding; no SIC yesterday during the day but required cathing at night time.        medications    apixaban ANTICOAGULANT  5 mg Oral BID     atorvastatin  20 mg Oral Daily     diclofenac   Transdermal Q8H    And     diclofenac   Transdermal BID     finasteride  5 mg Oral Daily     metoprolol tartrate  25 mg Oral BID     psyllium  1 packet Oral Daily     senna-docusate  1 tablet Oral At Bedtime     tamsulosin  0.4 mg Oral QPM        acetaminophen, bisacodyl, diclofenac **AND** diclofenac **AND** diclofenac, lidocaine 2 %, polyethylene glycol, senna-docusate     physical exam  /61 (BP Location: Right arm)  Pulse 74  Temp 96.1  F (35.6  C) (Oral)  Resp 16  Ht 1.798 m (5' 10.8\")  Wt 71.6 kg (157 lb 12.8 oz)  SpO2 95%  BMI 22.13 kg/m2     Gen: NAD, pocket talker in place   Pulm: non-labored in room air   Ext: no edema in bilateral lower extremities    Neuro/MSK: deferred for conversation     labs  Now new labs     Rehabilitation - continue comprehensive acute inpatient rehabilitation program with multidisciplinary approach including therapies, rehab nursing, and physiatry following. See interval history for updates.      assessment and plan    Mr. Angelito Castellanos is a  89 year old man with a history of chronic a-fib s/p PPM, BPH, Reflux, and HTN who presented to [facility] 4/29  with slurred speech, left facial droop, and left sided weakness admitted to  ARU 5/3 for " ongoing rehabilitation and medical management.       Acute ischemic stroke with large vessel occlusion- presented with left hemiparesis, dysarthria, dysphagia.  CT Head 4/30: Evolving ischemic infarcts in the right and left middle cerebral artery territories.Diffuse cerebral volume loss and cerebral white matter changes consistent with chronic small vessel ischemic disease  -continue DD1 with nectar. 05/08/18 now on DD2 with nectar thick. FVSS scheduled for 5/17  Continue asa- then start apixiban 5/14  -continue lipitor 20 mg daily (started by neurology)  -continue PT/OT/SLP  -follow up with neurology ([facility])  - HTN, A-Fib management as below    Chronic A-fib- received asa through 5/13 started apixiban 5/14 per neurology recs.  PPM in place.   -continue metoprolol 25 mg bid  -continue apixiban.    HTN- some variability overall stable.  -continue metoprolol    Urinary Retention- 5/4 UA with + hematuria, + LE, suspected UTI received 3 days cipro with UCx neg for pathogen.   Both patient and his daughter reported urinary obstructive symptom prior to admission including urge incontinence, dribbling, straining and weak stream  -continue bliss (placed 5/5)  and catheter cares-   -continue flomax & finasteride  -f/u with urology team as outpatient    - bliss removed 5/14 with some ongoing retention, intermittent small volume uop and urinary incontinence- continue to monitor    Constipation- reportedly without BM for several days, no n/v/, no ab pain, passing gas then with increased bowel meds 5/10 and 5/11 with reportedly excessive BM 5/11 & 5/12  -monitor.   -continue fiber  -prn senokot & miralax as well as supp    Naima Moya MD  Physical Medicine & Rehabilitation    I spent a total of 35 minutes face-to-face and managing the care of Angelito Castellanos. Over 50% of my time on the unit was spent counseling the patient and coordinating care. See note for details.

## 2018-05-17 NOTE — PROGRESS NOTES
"  [facility]   Acute Rehabilitation Unit  Daily progress note    interval history  Angelito Castellanos was seen sitting up at bedside table finishing his breakfast.  Reports he is doing well.  Notes the sensation to void has returned and he is having continent urine output at times, notes has sensation to have bm as well though at times unable to get assistance to toilet in time.  Discussed plan for video swallow and patient is hopeful it goes well.  He denies cough, sob, fever, n/v/ or other concerns.     SLP: Recommending that pt continue with DD2 solids but that pt's liquids be advanced to thin liquids    Please see separate rounds document by Dr. Naima Moya for further details.      medications    apixaban ANTICOAGULANT  5 mg Oral BID     atorvastatin  20 mg Oral Daily     barium sulfate 40%   Oral Once     diclofenac   Transdermal Q8H    And     diclofenac   Transdermal BID     finasteride  5 mg Oral Daily     metoprolol tartrate  25 mg Oral BID     psyllium  1 packet Oral Daily     senna-docusate  1 tablet Oral At Bedtime     tamsulosin  0.4 mg Oral QPM        acetaminophen, bisacodyl, diclofenac **AND** diclofenac **AND** diclofenac, lidocaine 2 %, polyethylene glycol, senna-docusate     physical exam  BP 98/58 (BP Location: Right arm)  Pulse 77  Temp 97.5  F (36.4  C) (Oral)  Resp 16  Ht 1.798 m (5' 10.8\")  Wt 71.6 kg (157 lb 12.8 oz)  SpO2 97%  BMI 22.13 kg/m2     Gen: NAD, pocket talker in place   CV: irreg  Pulm: non-labored clear on room air   AB: soft non distended non tender  Ext: no edema in bilateral lower extremities    Neuro/MSK: alert sitting up in chair, [place] (used pocket talker), voice clear, upper extremities 4/5 throughout, Hf 4-/5 L R 4/5, ke/kf 4/5. Df/pf 4+/5     labs  Now new labs     Rehabilitation - continue comprehensive acute inpatient rehabilitation program with multidisciplinary approach including therapies, rehab nursing, and physiatry " following. See interval history for updates.      assessment and plan    Mr. Angelito Castellanos is a  89 year old man with a history of chronic a-fib s/p PPM, BPH, Reflux, and HTN who presented to [facility] 4/29  with slurred speech, left facial droop, and left sided weakness admitted to  ARU 5/3 for ongoing rehabilitation and medical management.       Acute ischemic stroke with large vessel occlusion- presented with left hemiparesis, dysarthria, dysphagia.  CT Head 4/30: Evolving ischemic infarcts in the right and left middle cerebral artery territories.Diffuse cerebral volume loss and cerebral white matter changes consistent with chronic small vessel ischemic disease  -continue DD1 with nectar. 05/08/18 now on DD2 with nectar thick. FVSS today  Continue asa- then start apixiban 5/14  -continue lipitor 20 mg daily (started by neurology)  -continue PT/OT/SLP  -follow up with neurology ([facility])  - HTN, A-Fib management as below    Chronic A-fib- received asa through 5/13 started apixiban 5/14 per neurology recs.  PPM in place.   -continue metoprolol 25 mg bid  -continue apixiban.    HTN- some variability overall stable. SBP now on 100-120s  -will continue metoprolol- if continues to remain in this range consider decrease dose.       Urinary Retention- 5/4 UA with + hematuria, + LE, suspected UTI received 3 days cipro with UCx neg for pathogen. Both patient and his daughter reported urinary obstructive symptom prior to admission including urge incontinence, dribbling, straining and weak stream  -continue flomax & finasteride  - bliss removed 5/14, intermittent small volume uop and urinary incontinence- no ST cath for >24 hours anticipate dc without bliss  -follow up with urology     Constipation- +BM 5/17  -monitor.   -continue fiber  -prn senokot & miralax as well as supp    Naima Moya MD  Physical Medicine & Rehabilitation

## 2018-05-17 NOTE — PROGRESS NOTES
05/17/18 1000   General Information   Onset Date 04/29/18   Start of Care Date 05/04/18   Referring Physician JANETTE Kelly   Patient/Family Goals Statement not stated   Swallowing Evaluation Videofluoroscopic evaluation   Behaviorial Observations Confused   Mode of current nutrition Oral diet   Type of oral diet Dysphagia diet level 2;Nectar - thick liquid   Respiratory Status Room air   Comments Angelito Castellanos is a 89 year old male with past medical history significant for chronic atrial fibrillation not on anticoagulation, diabetes that is diet-c.He initially presented to [facility] on 4- with slurred speech, left facial droop and left-sided weakness.- reight frontal stroke as well as lacunar infarcts. Pthad a prior VFSS on 5/2/18-- had deep larygneal penetration with thin liquids at that time- pthas remained on nectar thickened liquids since belkys- VFSS today to see if liquids and diet can be further advanced.    VFSS Evaluation   VFSS Additional Documentation Yes   VFSS Eval: Radiology   Views Taken left lateral   Physical Location of Procedure radiology dept   VFSS Eval: Thin Liquid Texture Trial   Mode of Presentation, Thin Liquid cup;spoon;self-fed;fed by clinician   Order of Presentation 1,2,3,5,6   Preparatory Phase Poor bolus control   Oral Phase, Thin Liquid Poor AP movement;Premature pharyngeal entry   Pharyngeal Phase, Thin Liquid Delayed swallow reflex;Residue in valleculae;Residue in pyriform sinus   Rosenbek's Penetration Aspiration Scale: Thin Liquid Trial Results 1 - no aspiration, contrast does not enter airway   Successful Strategies Trialed During Procedure, Thin Liquid hard swallow;other (see comments)  (double swallow)   Diagnostic Statement Pt with some mid reduced bolus control as thin liquids both by teaspoon and by cup rim reuslt in premature pharngeal entry to the level of the pyriform sinus'- despite this- pt demosntrates adequate control at the pharygneal level as  there was no aspiration or penetraition on any of the thin trials. Pt also has a mild delayed swallow initiation. Following the initial swallow- pt does have some mild vallecular and pyrifrom sinus rsidue/pooking- clears to minimal to trace amounts following a 2nd swallow. Also attempted to have pt compelte a trial with a straw- but pt was not able to slurp up the liquid into the staw   VFSS Eval: Semisolid Texture Trial   Mode of Presentation, Semisolid spoon;fed by clinician   Order of Presentation 4   Preparatory Phase Insufficient mastication;Poor bolus control;Other (see comments)  (slow oral prep)   Oral Phase, Semisolid Poor AP movement   Pharyngeal Phase, Semisolid Delayed swallow reflex;Residue in valleculae;Residue in pyriform sinus   Rosenbek's Penetration Aspiration Scale: Semisolid Food Trial Results 1 - no aspiration, contrast does not enter airway   Successful Strategies Trialed During Procedure, Semisolid hard swallow;other (see comments)  (swallow 2-3 x; altenate with liquids)   Diagnostic Statement Pt demosntrates very prolonged oral prep/mastication and has difficulty with A_P propulsion- some lingual pumping but is eventually gracie to move bolus posterioroly to swallow; pt with initial mild- moderate amounts of vallecular and pyriform sinus residue- this clears to minimal to mild amounts with swallowing additional time and altnerating with sips of liquids.    FEES Evaluation   Additional Documentation No   Swallow Compensations   Swallow Compensations Alternate viscosity of consistencies;Effortful swallow;Pacing;Reduce amounts;Multiple swallow   Results Oral difficulties only;Other (see comments)  (premature pharyngeal entry; pharygneal residue)   Esophageal Phase of Swallow   Patient reports or presents with symptoms of esophageal dysphagia Yes   Esophageal comments hx of acid reflux   General Therapy Interventions   Planned Therapy Interventions Dysphagia Treatment   Dysphagia treatment  Oropharyngeal exercise training;Modified diet education;Instruction of safe swallow strategies   Swallow Eval: Clinical Impressions   Skilled Criteria for Therapy Intervention Skilled criteria met.  Treatment indicated.   Functional Assessment Scale (FAS) 4   Dysphagia Outcome Severity Scale (FELICITAS) Level 4 - FELICITAS   Treatment Diagnosis mild- moderate oral pharyngeal dysphagia   Diet texture recommendations Dysphagia diet level 2;Thin liquids   Recommended Feeding/Eating Techniques alternate between small bites and sips of food/liquid;hard swallow w/ each bite or sip;maintain upright posture during/after eating for 30 mins;small sips/bites;other (see comments)  (multipl e swallows)   Demonstrates Need for Referral to Another Service occupational therapy;physical therapy   Therapy Frequency daily   Predicted Duration of Therapy Intervention (days/wks) 1 week   Anticipated Discharge Disposition inpatient rehabilitation facility   Risks and Benefits of Treatment have been explained. Yes   Patient, family and/or staff in agreement with Plan of Care Yes   Clinical Impression Comments Completed VFSS to see if pt can further advance diet.  Based on eval:pt's swallow functin has improved- now demosntrating a mild- moderate oral pharygneal dysphagia characterized by the following: premature pharygneal entry to the level of the pyriform sinus' with thin liquid trials by teaspoon and by cup rim ( and also a mild delayed swallow initiation)- but despitre this- pt controls the thin liquids material well at the pharygneal level- there was no aspiration or penetration with thin liquid trials. Attempte to trial thin by straw too but pt was not able to slurp upr the liquid into the straw. Pt does have some mild pooling of thin liquids in the valleculae and pyriform sinus following the swallow but with additional swallows this clears to trace amounts. With semi-solid consistency- pt is noted to have prologned mastication and oral prep  with the semi- solids and some difficulty with A-P propulsion of bolus- noting some lingual pumping. Pt initially is noted to have mild to moderate pharygneal retention both in the valleculae and pyriform sinus' but his clears to more mild amounts when f/u with extra swallows and when f/u with sips of thin liquids. REcommending that pt continue with DD2 solids but that pt's liquids be advanced to thin liquids. Pt needs to continue to use safe swallow strategies of upright for all po, small bites/sips, alternate solids and liquids, multiple swallows with both liquids and solids, pace self- eat slowly- 1 bite at a time. SLP to continue to follow for instructin on safe swallow strategies, diet tolerance, and readiness for further diet advancment as well as oral pharyngeal excs. SLP will need continued sptx following d/c to TCU as pt's diet remains below baseline   Total Evaluation Time   Total Evaluation Time (Minutes) 30

## 2018-05-17 NOTE — PLAN OF CARE
Problem: Goal/Outcome  Goal: Goal Outcome Summary  Outcome: Improving  FOCUS/GOAL  Bladder management and Medical management    ASSESSMENT, INTERVENTIONS AND CONTINUING PLAN FOR GOAL:  Alert & oriented, able to make needs known, [place], uses pocket talker. Needs minimal assist of one with turning and reposition. Incontinent of bladder x1 at the beginning of the shift, condom catheter placed with total output of 400 cc of fco urine. Denies pain or discomfort. Pt has been resting comfortable between rounds.

## 2018-05-17 NOTE — DISCHARGE SUMMARY
[facility]   Acute Rehabilitation Unit  Discharge summary     Date of Admission: 5/3/2018  Date of Discharge: 05/22/18   Disposition: TCU  Primary Care Physician: Gerardo Adhikari  Attending physician: Naima Moya MD      discharge diagnosis    Acute ischemic stroke with large vessel occlusion  Chronic A-fib  HTN  Urinary Retention      brief summary  Mr. Angelito Castellanos is a  89 year old man with a history of chronic a-fib s/p PPM, BPH, Reflux, and HTN who presented to [facility] 4/29  with slurred speech, left facial droop, and left sided weakness admitted to  ARU 5/3 for ongoing rehabilitation and medical management.     rehabilitation course  Swallowing/Nutrition: Completed VFSS 5/17. Based on eval:pt's swallow functin has improved- now demosntrating a mild- moderate oral pharygneal dysphagia characterized by the following: premature pharygneal entry to the level of the pyriform sinus' with thin liquid trials by teaspoon and by cup rim ( and also a mild delayed swallow initiation) there was no aspiration or penetration with thin liquid trials. Attempted to trial thin by straw too but pt was not able. Pt does have some mild pooling of thin liquids in the valleculae and pyriform sinus following the swallow but with additional swallows this clears to trace amounts. With semi-solid consistency- pt is noted to have prologned mastication and oral prep with the semi- solids and some difficulty with A-P propulsion of bolus- noting some lingual pumping.  Recommending that pt continue with DD2 solids but that pt's liquids be advanced to thin liquids. Pt needs to continue to use safe swallow strategies of upright for all po, small bites/sips, alternate solids and liquids, multiple swallows with both liquids and solids, pace self- eat slowly- 1 bite at a time.  will need continued sptx following d/c to TCU as pt's diet remains below baseline     Bowel/Bladder: Incontinent of bowel  and bladder, continues working on times toileting during the day, may wear condom catheter at night.     Psychosocial: Pt is a 89-year-old male who lives in a house with his daughter, Shae, in [place], [place]. He has another adult daughter, Ethan, who lives locally, as well. Both are very supportive and involved, as are his adult grandchildren. Pt/family ultimately would like for pt to return home, but understand that a TCU stay would be beneficial to continue with therapies. .       ADLs/IADLs: Pt's progress is slow d/t cognitive deficits and poor safety awareness. Pt requires SBA with G/H tasks standing at EOS with FWW. Pt requires CGA with gathering clothing from closet with FWW with mod v/c's for safety with use of FWW. Pt requires SBA with UBD seated with verbal cues to initiate and min A with managing buttons. Pt requires min A with donning/doffing pants d/t difficulty threading LLE. Pt requires up to min A with donning/doffing socks and shoes seated. Pt is incontinent of BM and bladder. Pt requires CGA with toilet transfer with FWW with verbal and tactile cues for safe approach, and up to max A with toilet hygiene d/t incontinence. Pt scored 14/30 on the MOCA indicating cognitive deficits. Recommended 24/7 supervision upon D/C; pt going to D/C to TCU with ongoing OT services.      Mobility:  Pt with slow progress with therapies due to cognitive deficits. Patient needs cues for safety with most mobility tasks, remains impulsive with mobility. Has demonstrated increased indep with bed mobility and transfers needing SBA to mod I depending on fatigue. Ambulates with CGA - Rosario and FWW; occasionally needs assistance advancing L LE during gait. Patient would benefit from 24 hour supervision at discharge d/t cognition and safety concerns, at risk for falls, limited carryover for learning during therapy sessions. DC to TCU vs [facility] with increased services. Patient would benefit from continued skilled PT for  progression of balance, improved indep with functional mobility in a TCU setting.      Cognition/Language: Pt has mild- moderate deficits in short term memory, flexible attention and with problem solving/reasoning for more complex tasks. Pt recently has made some progress with recall of directions and during tx tasks - visual memory task 2 days ago at 100%-- but memory is variable pending task/situation and is not always consistent. With problem solving reasoning- more basic tasks- able to complete with standby assist but with more complex tasks- needing at least moderate amount of assist. Pt would need 24 hours supervision at d/c due to safety concerns with cognition. Recommend d/c to TCU for ongoing tx to address cognition and swallowing.       mEDICAL COURSE  Acute ischemic stroke with large vessel occlusion- presented with left hemiparesis, dysarthria, dysphagia.  CT Head 4/30: Evolving ischemic infarcts in the right and left middle cerebral artery territories.Diffuse cerebral volume loss and cerebral white matter changes consistent with chronic small vessel ischemic disease     -continue DD1 with nectar. 05/08/18 now on DD2 with nectar thick. FVSS 5/17; diet was advanced to thin liquids.  On asa initially and now on apixiban since 5/14  -continue lipitor 20 mg daily (started by neurology)  -continue PT/OT/SLP  -follow up with neurology ([facility])  - HTN, A-Fib management as below     Chronic A-fib- received asa through 5/13 started apixiban 5/14 per neurology recs.  PPM in place.   -continue metoprolol 25 mg bid  -continue apixiban.     HTN- some variability overall stable. SBP now on 100-120s  -will continue metoprolol 12.5 bid       Urinary Retention- 5/4 UA with + hematuria, + LE, suspected UTI received 3 days cipro with UCx neg for pathogen. Both patient and his daughter reported urinary obstructive symptom prior to admission including urge incontinence, dribbling, straining and weak stream    -continue flomax &  finasteride  - bliss removed 5/14, intermittent small volume uop and urinary incontinence- no ST cath has been needed over the past few days   - continue timed toileting and double voiding (elevated PVRs)  - follow up with urology       dISCHARGE MEDICATIONS  Current Discharge Medication List      START taking these medications    Details   apixaban ANTICOAGULANT (ELIQUIS) 5 MG tablet Take 1 tablet (5 mg) by mouth 2 times daily    Associated Diagnoses: Atrial fibrillation, unspecified type (H)      finasteride (PROSCAR) 5 MG tablet Take 1 tablet (5 mg) by mouth daily  Qty: 30 tablet    Associated Diagnoses: Urinary retention with incomplete bladder emptying      psyllium (METAMUCIL/KONSYL) Packet Take 1 packet by mouth daily    Associated Diagnoses: Constipation, unspecified constipation type      senna-docusate (SENOKOT-S;PERICOLACE) 8.6-50 MG per tablet Take 1-2 tablets by mouth 2 times daily as needed for constipation  Qty: 100 tablet    Associated Diagnoses: Constipation, unspecified constipation type      tamsulosin (FLOMAX) 0.4 MG capsule Take 1 capsule (0.4 mg) by mouth every evening  Qty: 60 capsule    Associated Diagnoses: Urinary retention with incomplete bladder emptying; Acute embolic stroke (H)         CONTINUE these medications which have CHANGED    Details   acetaminophen (TYLENOL) 325 MG tablet Take 2 tablets (650 mg) by mouth every 6 hours as needed for mild pain  Qty: 100 tablet    Associated Diagnoses: 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)      metoprolol tartrate (LOPRESSOR) 25 MG tablet Take 0.5 tablets (12.5 mg) by mouth 2 times daily  Qty: 60 tablet    Associated Diagnoses: Atrial fibrillation, unspecified type (H)         CONTINUE these medications which have NOT CHANGED    Details   atorvastatin (LIPITOR) 20 MG tablet Take 1 tablet (20 mg) by mouth daily  Qty: 90 tablet, Refills: 1    Associated Diagnoses: Cerebral infarction due to embolism of left middle  cerebral artery (H)      bisacodyl (DULCOLAX) 10 MG Suppository Place 1 suppository (10 mg) rectally daily as needed for constipation  Qty: 30 suppository    Associated Diagnoses: Slow transit constipation         STOP taking these medications       aspirin 325 MG EC tablet Comments:   Reason for Stopping:                 DISCHARGE INSTRUCTIONS AND FOLLOW UP    Discharge Procedure Orders  UROLOGY ADULT REFERRAL   Referral Type: Consultation     General info for SNF   Order Comments: Length of Stay Estimate: Short Term Care: Estimated # of Days <30  Condition at Discharge: Improving  Level of care:skilled   Rehabilitation Potential: Good  Admission H&P remains valid and up-to-date: Yes  Recent Chemotherapy: N/A  Use Nursing Home Standing Orders: Yes     Mantoux instructions   Order Comments: Give two-step Mantoux (PPD) Per Facility Policy Yes     Reason for your hospital stay   Order Comments: Admitted for rehabilitation following hospitalization for stroke.     Bladder scan   Order Comments: X 2 for post void residual     Activity - Up with nursing assistance   Order Specific Question Answer Comments   Is discharge order? Yes      Follow Up ([facility])   Order Comments: Follow up with primary care provider, Gerardo Adhikari/ ZAHIDA jay, within 7 days follow up hospitalization    Follow up with urology- follow up urinary retention    Follow up with neurology- f/u stroke.     Appointments on [place] and/or [facility] (with [facility] or [facility] provider or service). Call [phone] if you haven't heard regarding these appointments within 7 days of discharge.     DNR/DNI     Occupational Therapy Adult Consult   Order Comments: Evaluate and treat as clinically indicated.    Reason:  stroke     Speech Language Path Adult Consult   Order Comments: Evaluate and treat as clinically indicated.    Reason:  Stroke- impaired cognition, swallow     Physical Therapy Adult Consult   Order Comments: Evaluate and treat as clinically  indicated.    Reason:  stroke     Advance Diet as Tolerated   Order Comments: Follow this diet upon discharge: Orders Placed This Encounter     Room Service     Combination Diet Dysphagia Diet Level 2: Mechan Altered; Thin Liquids (water, ice chips, juice, milk gelatin, ice cream, etc)   Order Specific Question Answer Comments   Is discharge order? Yes           Discharge summary was forwarded to Gerardo Adhikari (PCP) at the time of discharge, so as to bridge from hospital to outpatient care.     It was our pleasure to care for Angelito Castellanos during this hospitalization. Please do not hesitate to contact me should there be questions regarding the hospital course or discharge plan.        Naima Moya MD  Physical Medicine & Rehabilitation

## 2018-05-17 NOTE — PLAN OF CARE
Problem: Patient Care Overview  Goal: Plan of Care/Patient Progress Review  FOCUS/GOAL  Bladder management, Nutrition/Feeding/Swallowing precautions, Mobility and Safety management    ASSESSMENT, INTERVENTIONS AND CONTINUING PLAN FOR GOAL:  Pt denied pain during shift. Denied SOB, denied difficulty breathing. Denied numbness or tingling. Video swallow study completed with SLP - upgraded to thin liquids, remains on DD2 diet. Timed toileting implementing, preventing incontinence during shift. Random bladder scan [id number] cc - pt declined urge to void (new, pt feeling urge to void during shift) - reported off, and will follow-up with double voiding and PVR monitoring per orders. Call light within reach, check ins provided. Alarms on for safety purposes; pt did not set off alarms, but when staff is present in room, pt started to get up x2 to ambulate before writer was ready for transfer. Pt was able to walk into the bathroom CGA with FWW and gait belt as AD, staying within arms length. Continue with POC. Family looking into TCU placement - MAP not needed. Continue to review medications for education purpose - complete during shift prior to medication administration.

## 2018-05-17 NOTE — PLAN OF CARE
Problem: Goal/Outcome  Goal: Goal Outcome Summary  Social Work Services Progress Note    Hospital Day: 15  Collaborated with:  Pt's daughter - Ethan    Data:  Received voicemails from [facility] and [facility] stating they'd likely be able to accept pt in TCU. Called pt's daughter Ethan to update. She reported that they were on their way to tour these facilities this morning and thanked SW for the update. Asked that she keep SW posted and that I will follow up on additional referrals, as well.     Intervention:  Discharge planning     Assessment:  Pt's daughter was pleasant and receptive to SW on phone.    Plan:    Anticipated Disposition:  Facility:  TCU - referrals pending    Barriers to d/c plan:  Confirmation of TCU placement     Follow Up:  SW will continue to follow, support and assist with ongoing social service and discharge planning needs.     DEBBY Navarro, [facility]  - Coverage for Acute Rehabilitation Center  Pager: [phone]  [email or web address]     NO LETTER

## 2018-05-17 NOTE — PLAN OF CARE
Problem: Goal/Outcome  Goal: Goal Outcome Summary  Outcome: No Change  Requires maximum assistance to transfer from bed to chair and vice versa. Assisted to chair for meals. Appetite is poor, consumed 25% of dinner. Fluid intake promoted. Incontinent of both bowel and bladder this evening. Barrier cream applied to the coccyx area. Plans in place to discharge to a TCU. No complaints of discomfort. Bed alarm applied.

## 2018-05-18 PROCEDURE — 40000133 ZZH STATISTIC OT WARD VISIT: Performed by: OCCUPATIONAL THERAPIST

## 2018-05-18 PROCEDURE — 25000132 ZZH RX MED GY IP 250 OP 250 PS 637: Mod: GY | Performed by: PHYSICAL MEDICINE & REHABILITATION

## 2018-05-18 PROCEDURE — 25000132 ZZH RX MED GY IP 250 OP 250 PS 637: Mod: GY | Performed by: PHYSICIAN ASSISTANT

## 2018-05-18 PROCEDURE — 97530 THERAPEUTIC ACTIVITIES: CPT | Mod: GP | Performed by: PHYSICAL THERAPIST

## 2018-05-18 PROCEDURE — 40000225 ZZH STATISTIC SLP WARD VISIT

## 2018-05-18 PROCEDURE — A9270 NON-COVERED ITEM OR SERVICE: HCPCS | Mod: GY | Performed by: PHYSICIAN ASSISTANT

## 2018-05-18 PROCEDURE — 97530 THERAPEUTIC ACTIVITIES: CPT | Mod: GO | Performed by: OCCUPATIONAL THERAPIST

## 2018-05-18 PROCEDURE — 92526 ORAL FUNCTION THERAPY: CPT | Mod: GN | Performed by: SPEECH-LANGUAGE PATHOLOGIST

## 2018-05-18 PROCEDURE — 97110 THERAPEUTIC EXERCISES: CPT | Mod: GP | Performed by: PHYSICAL THERAPIST

## 2018-05-18 PROCEDURE — 92526 ORAL FUNCTION THERAPY: CPT | Mod: GN

## 2018-05-18 PROCEDURE — 97116 GAIT TRAINING THERAPY: CPT | Mod: GP | Performed by: PHYSICAL THERAPIST

## 2018-05-18 PROCEDURE — 97112 NEUROMUSCULAR REEDUCATION: CPT | Mod: GP | Performed by: PHYSICAL THERAPIST

## 2018-05-18 PROCEDURE — 97535 SELF CARE MNGMENT TRAINING: CPT | Mod: GO | Performed by: OCCUPATIONAL THERAPIST

## 2018-05-18 PROCEDURE — 40000193 ZZH STATISTIC PT WARD VISIT: Performed by: PHYSICAL THERAPIST

## 2018-05-18 PROCEDURE — A9270 NON-COVERED ITEM OR SERVICE: HCPCS | Mod: GY | Performed by: PHYSICAL MEDICINE & REHABILITATION

## 2018-05-18 PROCEDURE — 40000225 ZZH STATISTIC SLP WARD VISIT: Performed by: SPEECH-LANGUAGE PATHOLOGIST

## 2018-05-18 PROCEDURE — 12800006 ZZH R&B REHAB

## 2018-05-18 RX ORDER — TAMSULOSIN HYDROCHLORIDE 0.4 MG/1
0.4 CAPSULE ORAL EVERY EVENING
Qty: 60 CAPSULE | DISCHARGE
Start: 2018-05-18 | End: 2018-06-01

## 2018-05-18 RX ADMIN — APIXABAN 5 MG: 2.5 TABLET, FILM COATED ORAL at 20:41

## 2018-05-18 RX ADMIN — Medication 12.5 MG: at 20:44

## 2018-05-18 RX ADMIN — PSYLLIUM HUSK 1 PACKET: 3.4 POWDER ORAL at 08:58

## 2018-05-18 RX ADMIN — APIXABAN 5 MG: 2.5 TABLET, FILM COATED ORAL at 08:58

## 2018-05-18 RX ADMIN — FINASTERIDE 5 MG: 5 TABLET, FILM COATED ORAL at 08:58

## 2018-05-18 RX ADMIN — TAMSULOSIN HYDROCHLORIDE 0.4 MG: 0.4 CAPSULE ORAL at 20:44

## 2018-05-18 RX ADMIN — ATORVASTATIN CALCIUM 20 MG: 10 TABLET, FILM COATED ORAL at 20:43

## 2018-05-18 RX ADMIN — Medication 12.5 MG: at 08:58

## 2018-05-18 NOTE — PLAN OF CARE
Problem: Goal/Outcome  Goal: Goal Outcome Summary  Pt continues to progress in PT and was able to fully participate in games days today.  He demonstrated good standing balance with all activities.  He did need mild-mod help with problem solving in order to match color combination to picture in 1 game.  He demonstrated great ability to squat to floor to retrieve objects throughout.  He demonstrated difficulty with step-through pattern on LLE during initial gait, but improved with BLE stretching.

## 2018-05-18 NOTE — PLAN OF CARE
"Problem: Goal/Outcome  Goal: Goal Outcome Summary  FOCUS/GOAL  Bowel management and Bladder management    ASSESSMENT, INTERVENTIONS AND CONTINUING PLAN FOR GOAL:  Pt alert, hard of hearing, did not talk much. Ate 100% sitting up in chair. He was almost through with dinner and he stated, \"come back soon will you, I'm going to need to use the bathroom.\" Encouraged him to use call light when he is done eating and he verbalized understanding. A few minutes later, nurse returned to check on him. He was sitting quietly in chair. When he saw nurse he said, \"I need to use the bathroom\" when asked if he used the call light he said yes, but light was not on.  He declined help with pericares and was able to perform himself with extra time, smear BM. CGA with gait belt and walker. He was able to stand at sink and wash hands. PVR 126cc. Bed and chair alarms in use, he has not been impulsive. Daughter visited and is bringing home his laundry to wash.        "

## 2018-05-18 NOTE — PLAN OF CARE
Problem: Patient Care Overview  Goal: Plan of Care/Patient Progress Review  SLP present for feeding assessment, language and cognitive treatment. Angelito consumed NDD2 solids and thin liquids. He demonstrated no difficulties with solids. He demonstrated a cough x2 noted with thin liquids, slight eye watering, vocal quality remained clear. SLP unable to discern aspiration versus penetration in clinical swallow setting. Recommend continue NDD2 and thin liquids, attempt NDD3 solids in future meal observations.  In a task targeting immediate memory, Angelito attended to 5 words read aloud and answered questions accurately 20% of the time, given second reading increased accuracy to 100%.  In a task targeting expressive language, Angelito  was challenged to name associated words to given target words. He generated 5 related words on average independently, given min semantic cues increased to 7.

## 2018-05-18 NOTE — PLAN OF CARE
Problem: Goal/Outcome  Goal: Goal Outcome Summary  Outcome: Improving  FOCUS/GOAL  Bladder management and Medical management    ASSESSMENT, INTERVENTIONS AND CONTINUING PLAN FOR GOAL:  Alert, able to make needs known, [place], uses pocket talker. Needs minimal assist with turning and reposition. Incontinent of bladder at beginning of shift, condom catheter placed for the rest of night. No BM this shift. Denies pain or discomfort.

## 2018-05-18 NOTE — PROGRESS NOTES
CLINICAL NUTRITION SERVICES - REASSESSMENT NOTE     Nutrition Prescription    RECOMMENDATIONS FOR MDs/PROVIDERS TO ORDER:  None    Malnutrition Status:    Patient does not meet criteria    Recommendations already ordered by Registered Dietitian (RD):  Add Boost Plus to lunch tray given possible weight loss    Future/Additional Recommendations:  Monitor po intake and weight trends.     EVALUATION OF THE PROGRESS TOWARD GOALS   Diet: DD2/ Thin (advanced to thin liquids yesterday after VFSS)  Intake: Patient is ordering well for TID meals (on room service with assist). Intakes documented as % of most meals. Patient reports good intakes.       NEW FINDINGS   - Weight is down significantly since admission, question accuracy of weights versus possible loss.   04/29/18: 71.4 kg (157 lb 6.5 oz) - hospital adm weight   05/03/18: 71.6 kg (157 lb 12.8 oz) - adm to ARU   05/18/18: 66 kg (145 lb 9.6 oz) - current weight     MALNUTRITION  % Intake: No decreased intake noted  % Weight Loss: Unable to assess with limited weights  Subcutaneous Fat Loss: None observed  Muscle Loss: None observed  Fluid Accumulation/Edema: Unable to assess  Malnutrition Diagnosis: Patient does not meet two of the above criteria necessary for diagnosing malnutrition    Previous Goals   Patient to consume % of nutritionally adequate meal trays TID, or the equivalent with supplements/snacks.  Evaluation: Met    Previous Nutrition Diagnosis  Predicted inadequate nutrient intake (energy/ protein) related to modified texture diet as evidenced by anticipated intake less than estimated needs.   Evaluation: Improving    CURRENT NUTRITION DIAGNOSIS  Predicted inadequate nutrient intake (energy/ protein) related to modified texture diet as evidenced by anticipated intake less than estimated needs.      INTERVENTIONS  Implementation  Medical food supplement therapy    Goals  Patient to consume % of nutritionally adequate meal trays TID, or the  equivalent with supplements/snacks.    Monitoring/Evaluation  Progress toward goals will be monitored and evaluated per protocol.      Daniela Rea MS, RD, LD

## 2018-05-18 NOTE — PLAN OF CARE
Problem: Goal/Outcome  Goal: Goal Outcome Summary  Social Work Services Progress Note    Hospital Day: 16  Collaborated with:  Pt's daughter-Ethan,     Data:  Spoke with Zayda in admissions at [facility] who stated they'd likely be able to accept pt, but do not have beds available until next week. Called pt's daughter, Ethan, to follow up on options. Ethan reported that they've only been able to tour [facility] and plan to tour others today. Asked that she and family tour and requested that they give SW a decision sometime today so final plans can be arranged. She noted that they're all trying to coordinate when theycan tour based on work schedules and will try to get back to SW. Provided SW contact info again for follow up.     Called all facilities with pending referrals to follow up and noted that family is finishing touring today and hope to have a final decision today.     Intervention:  Discharge planning    Assessment:  Pt's daughter was pleasant and receptive to SW on the phone.    Plan:    Anticipated Disposition:  TCU    Barriers to d/c plan: Pt/family decision on facility & bed availability at said facility     Follow Up:  SW will continue to follow, support and assist with ongoing social service and discharge planning needs.     Gita Gray, DEBBY, [facility]  - Coverage for Acute Rehabilitation Center  Pager: [phone]  [email or web address]     NO LETTER

## 2018-05-18 NOTE — PLAN OF CARE
Problem: Goal/Outcome  Goal: Goal Outcome Summary   Patient is alert. Disoriented to time. Very hard of hearing but does have a pocket talker that can utilize. Continue on dd2 diet with thin liquids, Appetite is good, independent with eating after set up.Taking meds crushed with applesauce. Cga for transfers and ambulation with gait belt and walker. Alarms are on for safety ,  No impulsivity noted. WIll continue with poc.

## 2018-05-19 PROCEDURE — 92526 ORAL FUNCTION THERAPY: CPT | Mod: GN | Performed by: REHABILITATION PRACTITIONER

## 2018-05-19 PROCEDURE — 97535 SELF CARE MNGMENT TRAINING: CPT | Mod: GO | Performed by: OCCUPATIONAL THERAPIST

## 2018-05-19 PROCEDURE — 25000132 ZZH RX MED GY IP 250 OP 250 PS 637: Mod: GY | Performed by: PHYSICIAN ASSISTANT

## 2018-05-19 PROCEDURE — 25000132 ZZH RX MED GY IP 250 OP 250 PS 637: Mod: GY | Performed by: PHYSICAL MEDICINE & REHABILITATION

## 2018-05-19 PROCEDURE — 97150 GROUP THERAPEUTIC PROCEDURES: CPT | Mod: GO | Performed by: STUDENT IN AN ORGANIZED HEALTH CARE EDUCATION/TRAINING PROGRAM

## 2018-05-19 PROCEDURE — 40000133 ZZH STATISTIC OT WARD VISIT: Performed by: STUDENT IN AN ORGANIZED HEALTH CARE EDUCATION/TRAINING PROGRAM

## 2018-05-19 PROCEDURE — A9270 NON-COVERED ITEM OR SERVICE: HCPCS | Mod: GY | Performed by: PHYSICAL MEDICINE & REHABILITATION

## 2018-05-19 PROCEDURE — A9270 NON-COVERED ITEM OR SERVICE: HCPCS | Mod: GY | Performed by: PHYSICIAN ASSISTANT

## 2018-05-19 PROCEDURE — 40000133 ZZH STATISTIC OT WARD VISIT: Performed by: OCCUPATIONAL THERAPIST

## 2018-05-19 PROCEDURE — 12800006 ZZH R&B REHAB

## 2018-05-19 PROCEDURE — 40000225 ZZH STATISTIC SLP WARD VISIT: Performed by: REHABILITATION PRACTITIONER

## 2018-05-19 RX ADMIN — ATORVASTATIN CALCIUM 20 MG: 10 TABLET, FILM COATED ORAL at 20:59

## 2018-05-19 RX ADMIN — APIXABAN 5 MG: 2.5 TABLET, FILM COATED ORAL at 20:59

## 2018-05-19 RX ADMIN — PSYLLIUM HUSK 1 PACKET: 3.4 POWDER ORAL at 07:53

## 2018-05-19 RX ADMIN — FINASTERIDE 5 MG: 5 TABLET, FILM COATED ORAL at 08:02

## 2018-05-19 RX ADMIN — TAMSULOSIN HYDROCHLORIDE 0.4 MG: 0.4 CAPSULE ORAL at 20:59

## 2018-05-19 RX ADMIN — Medication 12.5 MG: at 07:53

## 2018-05-19 RX ADMIN — Medication 12.5 MG: at 20:59

## 2018-05-19 RX ADMIN — APIXABAN 5 MG: 2.5 TABLET, FILM COATED ORAL at 08:02

## 2018-05-19 NOTE — PLAN OF CARE
Problem: Goal/Outcome  Goal: Goal Outcome Summary  Outcome: Improving  Patient is tolerating intakes of DD2 with thin liquids. VS'S, BP was high 120/102, , Metoprolol given  Rechecked BP is 107/67 HR 78. LS clear, BS active.Patient denies pain, SOB or CP. Bladder scanned for 227. Condom cath fall off but replaced and patent. No incontinence this shift. Will continue to monitor

## 2018-05-19 NOTE — PROGRESS NOTES
"  [facility]   Acute Rehabilitation Unit  Daily progress note    interval history  Angelito Castellanos was seen and examined in his room. His grandson Jw was present. Doing well; no pain or discomfort. Discussed discharge planning with his grandson and answered questions. Also spoke with SW; waiting for family's decision.     Participating well in therapies; requires min to mod A for safety when using FWW due to impaired problem solving and memory.     medications    apixaban ANTICOAGULANT  5 mg Oral BID     atorvastatin  20 mg Oral Daily     barium sulfate 40%   Oral Once     diclofenac   Transdermal Q8H    And     diclofenac   Transdermal BID     finasteride  5 mg Oral Daily     metoprolol tartrate  12.5 mg Oral BID     psyllium  1 packet Oral Daily     tamsulosin  0.4 mg Oral QPM        acetaminophen, bisacodyl, diclofenac **AND** diclofenac **AND** diclofenac, lidocaine 2 %, polyethylene glycol, senna-docusate     physical exam  /53  Pulse 75  Temp 96.3  F (35.7  C) (Oral)  Resp 16  Ht 1.798 m (5' 10.8\")  Wt 66 kg (145 lb 9.6 oz)  SpO2 100%  BMI 20.42 kg/m2     Gen: NAD, resting in bed    Pulm: non-labored on room air   AB: soft non tender  Ext: no edema in bilateral lower extremities    Neuro/MSK: [place]; mild left hemiparesis 4/5 and RUE/RLE 4+/5. Sensation intact to LT    labs  No new labs     Rehabilitation - continue comprehensive acute inpatient rehabilitation program with multidisciplinary approach including therapies, rehab nursing, and physiatry following. See interval history for updates.      assessment and plan    Mr. Angelito Castellanos is a  89 year old man with a history of chronic a-fib s/p PPM, BPH, Reflux, and HTN who presented to [facility] 4/29  with slurred speech, left facial droop, and left sided weakness admitted to  ARU 5/3 for ongoing rehabilitation and medical management.       Acute ischemic stroke with large vessel occlusion- presented with " left hemiparesis, dysarthria, dysphagia.  CT Head 4/30: Evolving ischemic infarcts in the right and left middle cerebral artery territories.Diffuse cerebral volume loss and cerebral white matter changes consistent with chronic small vessel ischemic disease  -continue DD1 with nectar. 05/08/18 now on DD2 with nectar thick. FVSS 5/17; diet was advanced to thin liquids.  On asa initially and now on apixiban since 5/14  -continue lipitor 20 mg daily (started by neurology)  -continue PT/OT/SLP  -follow up with neurology ([facility])  - HTN, A-Fib management as below    Chronic A-fib- received asa through 5/13 started apixiban 5/14 per neurology recs.  PPM in place.   -continue metoprolol 25 mg bid  -continue apixiban.    HTN- some variability overall stable. SBP now on 100-120s  -will continue metoprolol- if continues to remain in this range consider decrease dose.       Urinary Retention- 5/4 UA with + hematuria, + LE, suspected UTI received 3 days cipro with UCx neg for pathogen. Both patient and his daughter reported urinary obstructive symptom prior to admission including urge incontinence, dribbling, straining and weak stream  -continue flomax & finasteride  - bliss removed 5/14, intermittent small volume uop and urinary incontinence- no ST cath for >24 hours anticipate dc without bliss  -continue timed toileting and double voiding (elevated PVRs)  -follow up with urology     Constipation- resolved   -monitor.   -continue fiber  -prn senokot & miralax as well as supp        Naima Moya MD  Physical Medicine & Rehabilitation

## 2018-05-19 NOTE — PLAN OF CARE
Problem: Goal/Outcome  Goal: Goal Outcome Summary  FOCUS/GOAL  Bowel management, Bladder management, Nutrition/Feeding/Swallowing precautions and Medication management    ASSESSMENT, INTERVENTIONS AND CONTINUING PLAN FOR GOAL:  Pt alert and oriented x4, VSS, no complaints of pain. Pt asking to use bathroom, had been incont of smear BM and urine, voided more on toilet. Pt unable to void later in shift, bladder scanned for 574. Pt brought to bathroom by nurse and voided small amount urine and had medium soft BM, bladder scanned for 514. When Nurse came back to straight cath pt he had been incont in bed. PVR of 375, no catheterization performed. No patches on pt. Pt taking meds whole with applesauce. Nurse heard pt chew on the first pill given and instructed pt to not chew meds, pt followed directions and did not appear to have any difficulty with swallowing other meds.

## 2018-05-19 NOTE — PROGRESS NOTES
[facility], [place]   Physical Medicine and Rehabilitation Daily Note         Assessment and Plan of Care:   Mr. Angelito Castellanos is a very pleasant 89 year old man with a history of chronic a-fib s/p PPM, BPH, Reflux, and HTN who presented to [facility] 4/29  with slurred speech, left facial droop, and left sided weakness admitted to  ARU 5/3 for ongoing rehabilitation and medical management.     --Vitals stable. No lab today.  --Continue ongoing medical management.  --Continue therapies and plan of care.           Interval history:     The patient was seen and examined at the bedside. Nursing notes reviewed. Last BM 5/18. Urinary retention with slightly elevated PVRs and requiring intermittent cathing. Participating in therapies. No overnight events. Denies fever, chills, CP, SOB, N/V, abdominal pain, new pain or weakness/numbness/tingling.          Physical Exam:     Vitals:    05/18/18 2040 05/19/18 0557 05/19/18 0753 05/19/18 0900   BP: 124/53 110/61 (!) 125/102 101/63   BP Location:  Right arm Right arm Right arm   Pulse:  80 103 72   Resp:  16 16    Temp:  96.5  F (35.8  C)     TempSrc:  Oral     SpO2:  93% 97%    Weight:       Height:         Gen: NAD, sitting up out of bed  HEENT: [place]  Lungs: clear breath sounds b/l, no increased work of breathing  Abd: soft and non-tender  Ext: wwp, no edema in BLE, no tenderness in calves  MSK/neuro: Alert. Speech fluent. Moves BUE and BLE volitionally.   Skin: No open areas or wounds         Data:   Scheduled meds    apixaban ANTICOAGULANT  5 mg Oral BID     atorvastatin  20 mg Oral Daily     barium sulfate 40%   Oral Once     diclofenac   Transdermal Q8H    And     diclofenac   Transdermal BID     finasteride  5 mg Oral Daily     metoprolol tartrate  12.5 mg Oral BID     psyllium  1 packet Oral Daily     tamsulosin  0.4 mg Oral QPM       PRN meds:  acetaminophen, bisacodyl, diclofenac **AND** diclofenac **AND** diclofenac, lidocaine 2 %,  polyethylene glycol, senna-docusate    Danyelle Ledbetter, DO  Physical Medicine and Rehabilitation, PGY-4     The patient was discussed with staff, Dr. Greer.

## 2018-05-19 NOTE — PLAN OF CARE
Problem: Individualization  Goal: Patient Individualization  Outcome: No Change  Alert and oriented to self, place and time. Condom cath intact and draining clear urine, bladder scan for 179.Repositioning ind in bed, slept most of the night, will continue POC

## 2018-05-19 NOTE — PLAN OF CARE
Problem: Goal/Outcome  Goal: Goal Outcome Summary  PT: -60 minutes for not attending falls prevention class in PT, pt was in deep sleep, rehab tech tried to arise pt 2 times but unable to get fully awake enough to attend session

## 2018-05-19 NOTE — PROGRESS NOTES
"D/I: Left voice message for daughter, Ethan.  She returned call saying she got really sick and her sister is out of town, so they're deferring tours to the grandkids this weekend.  Writer reiterated that family needs to give input on choices by Monday morning due to time constraints; daughter receptive and promised to call sw back on Monday morning \"first thing\".  Writer had these updates with TCU referrals:  -[facility]-spoke with [facility] who will fill beds this weekend, but should have male openings Tues/Wed (check w/Rossana on Monday)  -[facility]-spoke with Felipe who said to check availability with Jennifer on Monday  -[facility]-spoke with Gifty who said wknd beds filled, but opening Monday so nurse will review referral Mon/call back Mon  -[facility]-spoke with [facility] who said [facility] is considering for stroke unit/call [facility] Mon  A/P:  SW will follow and assist with discharge planning.        "

## 2018-05-19 NOTE — PLAN OF CARE
"Problem: Patient Care Overview  Goal: Plan of Care/Patient Progress Review  OT: Pt participated in the established \"Transitions Group\" for stroke pts. Highlighting topics such as return to meaningful activities, health/wellness, fatigue, depression/anxiety, bowel/bladder considerations w/ community re-integration and caregiver wellness/support. Pt receptive to class. Pt reports personal goals after discharge are to walk better.      "

## 2018-05-19 NOTE — PLAN OF CARE
Problem: Patient Care Overview  Goal: Plan of Care/Patient Progress Review  Outcome: Therapy, progress toward functional goals as expected  Pt appears to be tolerating DD2 solids/thin liquids. SLP will continue to follow to ensure consistent tolerance.

## 2018-05-20 PROCEDURE — 25000132 ZZH RX MED GY IP 250 OP 250 PS 637: Mod: GY | Performed by: PHYSICAL MEDICINE & REHABILITATION

## 2018-05-20 PROCEDURE — 97530 THERAPEUTIC ACTIVITIES: CPT | Mod: GO | Performed by: OCCUPATIONAL THERAPIST

## 2018-05-20 PROCEDURE — 97535 SELF CARE MNGMENT TRAINING: CPT | Mod: GO | Performed by: OCCUPATIONAL THERAPIST

## 2018-05-20 PROCEDURE — 25000132 ZZH RX MED GY IP 250 OP 250 PS 637: Mod: GY | Performed by: PHYSICIAN ASSISTANT

## 2018-05-20 PROCEDURE — 97116 GAIT TRAINING THERAPY: CPT | Mod: GP

## 2018-05-20 PROCEDURE — 97127 ZZHC SP THERAPEUTIC INTERVENTION W/FOCUS ON COGNITIVE FUNCTION,EA 15 MIN: CPT | Mod: GN | Performed by: SPEECH-LANGUAGE PATHOLOGIST

## 2018-05-20 PROCEDURE — 40000133 ZZH STATISTIC OT WARD VISIT: Performed by: OCCUPATIONAL THERAPIST

## 2018-05-20 PROCEDURE — 12800006 ZZH R&B REHAB

## 2018-05-20 PROCEDURE — 40000193 ZZH STATISTIC PT WARD VISIT

## 2018-05-20 PROCEDURE — A9270 NON-COVERED ITEM OR SERVICE: HCPCS | Mod: GY | Performed by: PHYSICAL MEDICINE & REHABILITATION

## 2018-05-20 PROCEDURE — 92526 ORAL FUNCTION THERAPY: CPT | Mod: GN | Performed by: SPEECH-LANGUAGE PATHOLOGIST

## 2018-05-20 PROCEDURE — 40000225 ZZH STATISTIC SLP WARD VISIT: Performed by: SPEECH-LANGUAGE PATHOLOGIST

## 2018-05-20 PROCEDURE — A9270 NON-COVERED ITEM OR SERVICE: HCPCS | Mod: GY | Performed by: PHYSICIAN ASSISTANT

## 2018-05-20 PROCEDURE — 97112 NEUROMUSCULAR REEDUCATION: CPT | Mod: GP

## 2018-05-20 PROCEDURE — 97110 THERAPEUTIC EXERCISES: CPT | Mod: GP

## 2018-05-20 RX ADMIN — ATORVASTATIN CALCIUM 20 MG: 10 TABLET, FILM COATED ORAL at 21:05

## 2018-05-20 RX ADMIN — APIXABAN 5 MG: 2.5 TABLET, FILM COATED ORAL at 21:05

## 2018-05-20 RX ADMIN — Medication 12.5 MG: at 08:46

## 2018-05-20 RX ADMIN — TAMSULOSIN HYDROCHLORIDE 0.4 MG: 0.4 CAPSULE ORAL at 21:05

## 2018-05-20 RX ADMIN — APIXABAN 5 MG: 2.5 TABLET, FILM COATED ORAL at 08:46

## 2018-05-20 RX ADMIN — FINASTERIDE 5 MG: 5 TABLET, FILM COATED ORAL at 08:45

## 2018-05-20 RX ADMIN — PSYLLIUM HUSK 1 PACKET: 3.4 POWDER ORAL at 08:46

## 2018-05-20 RX ADMIN — Medication 12.5 MG: at 21:04

## 2018-05-20 NOTE — PLAN OF CARE
Problem: Goal/Outcome  Goal: Goal Outcome Summary  PT: During gait pt continues to demonstrate L LE decreased step length and height; given manual, verbal and visual cues pt able to demonstrate improvement in gait but with poor carry over.

## 2018-05-20 NOTE — PROGRESS NOTES
[facility], [place]   Physical Medicine and Rehabilitation Daily Note         Assessment and Plan of Care:   Mr. Angelito Castellanos is a very pleasant 89 year old man with a history of chronic a-fib s/p PPM, BPH, Reflux, and HTN who presented to [facility] 4/29  with slurred speech, left facial droop, and left sided weakness admitted to  ARU 5/3 for ongoing rehabilitation and medical management.     --Vitals stable. No lab today.  --Continue ongoing medical management.  --Continue therapies and plan of care.           Interval history:     The patient was seen and examined at the bedside. Nursing notes reviewed. Slept well. Participating in therapies. No overnight events. Denies fever, chills, CP, SOB, N/V, abdominal pain, new pain or weakness/numbness/tingling.          Physical Exam:     Vitals:    05/19/18 1045 05/19/18 2049 05/20/18 0616 05/20/18 0846   BP: 107/67 103/72 116/65 110/57   BP Location: Right arm Right arm Right arm Right arm   Pulse:  72 75 100   Resp:  16 16 16   Temp:  97.1  F (36.2  C) 96.4  F (35.8  C) 96.1  F (35.6  C)   TempSrc:  Oral Oral Oral   SpO2:   94% 92%   Weight:       Height:         Gen: NAD, sitting up out of bed  HEENT: [place]  Lungs: clear breath sounds b/l, no increased work of breathing  Abd: soft and non-tender  Ext: wwp, no edema in BLE, no tenderness in calves  MSK/neuro: Alert. Speech fluent. Moves BUE and BLE volitionally. Sensation intact to bilateral UE and LE to light touch.   Skin: No open areas or wounds         Data:   Scheduled meds    apixaban ANTICOAGULANT  5 mg Oral BID     atorvastatin  20 mg Oral Daily     barium sulfate 40%   Oral Once     diclofenac   Transdermal Q8H    And     diclofenac   Transdermal BID     finasteride  5 mg Oral Daily     metoprolol tartrate  12.5 mg Oral BID     psyllium  1 packet Oral Daily     tamsulosin  0.4 mg Oral QPM       PRN meds:  acetaminophen, bisacodyl, diclofenac **AND** diclofenac **AND** diclofenac,  lidocaine 2 %, polyethylene glycol, senna-docusate    Danyelle Ledbetter, DO  Physical Medicine and Rehabilitation, PGY-4     The patient was discussed with staff, Dr. Greer.

## 2018-05-20 NOTE — PLAN OF CARE
FOCUS/GOAL  Bladder management, Nutrition/Feeding/Swallowing precautions, Skin integrity and Safety management    ASSESSMENT, INTERVENTIONS AND CONTINUING PLAN FOR GOAL:  Bladder -- pt had a condomn cath at start of shift noted fco urine in the bag , condomcath was taken off by RN  For bladder training noted a wound at the back part of the penis which look like a cut  Wound care was done , condomn cath was not applied at hs to keep the wound BEBE which could help with wound healing , RN brought a new urinal in which the opening of the urinal is padded to prevent the plastic get in contact with the penile skin  Voided 150 ml to continue offering urinal  Or go to the toilet to prevent incontinent episode which not only for bladder but also for the bowels .  Nutrition / feeding -- need set up for eating and prompting to eat and assist t him in a comfortable position so that he could fed himself  Pt was able to fed self after set up and assisted with proper position 'not drinking enough fluids encouraged to drink more and it was offered to him q 2hrs   ADLS -- total assist with paricare . Max assist with upper and lower body undressing to change to  A gown set up for oral cares . Needs 2 staff to boost up on bed and 1staff to turn pt is able to follow instructions  .  Will continue to assist with safe performance of ADLS

## 2018-05-20 NOTE — PLAN OF CARE
Problem: Individualization  Goal: Patient Individualization  Alert and oriented to self, place and time, incontinent of bladder in his brief x 1, need assist with gerardo-cares and  his brief changed, slept most of the night, will continue POC

## 2018-05-20 NOTE — PLAN OF CARE
Problem: Goal/Outcome  Goal: Goal Outcome Summary  Outcome: Improving  Patient is A&O x 3. VS'S.LS clear, BS active.Denies pain. Tolerating DD2 with this liquids, slow eater needs ample time.Encourage fluids, requested for coffee, still sipping on that. Bed and chair alarm  Offered toilet every two hours but patient kept saying know, at 1120, patient was dry bladder scanned for 128, assisted to BR , void 90 cc of fco urine. . Transfers with assist of one using a walker and gait belt.. Will continue to monitor.

## 2018-05-20 NOTE — PLAN OF CARE
Problem: Patient Care Overview  Goal: Plan of Care/Patient Progress Review  OT: Pt participating well during ADL session. Demonstrating posterior lean during bed mobility and STS initially when getting OOB. Pt continues to require min A with UBD and LBD tasks. Scheduling IND day shower for 5/21/18 with plan for pt to D/C to TCU.

## 2018-05-20 NOTE — PLAN OF CARE
Problem: Patient Care Overview  Goal: Plan of Care/Patient Progress Review  Pt seen for swallow tx- meal f/u on current DD2 with thin liquids. There were no s/s of aspiration on DD2 textures or with multiple trials of thin liquids throughout the meal ( just advanced to thin liquids on TH of last week per VFSS). Pt continues to have slower oral oral prep with DD2 solids and mild difficulty with A-P movement-- dentition also a factor. Pt not reporting any sensation of pharyngeal retention but VFSS showed mild to moderate amounts that clears with additional swallows and f/u with sips of liquids. Pt generally using strategy of alternating consistencies independently. Likely DD2 will be the safest diet pt can advance to.Recommend continue with current DD2 with thin liquids. Pt needs to be upright for all po, small bites/sips, alternate solids and liquids, 1 bite at a time, pace self- eat slowly; double swallow. SLP to f/u for diet tolerance.    Completed game of MARY to target problem solving and reasoning- after being explained the rules of play - pt was able to make the best decisions for play with occasional cues. With a 2nd task targeting flexible thinking and word finding- multiple definitions- pt often able to provide 2 different definitions - but needing increased time to determine the 2nd definition

## 2018-05-21 PROCEDURE — 97530 THERAPEUTIC ACTIVITIES: CPT | Mod: GP

## 2018-05-21 PROCEDURE — 25000132 ZZH RX MED GY IP 250 OP 250 PS 637: Mod: GY | Performed by: PHYSICAL MEDICINE & REHABILITATION

## 2018-05-21 PROCEDURE — 12800006 ZZH R&B REHAB

## 2018-05-21 PROCEDURE — 40000225 ZZH STATISTIC SLP WARD VISIT: Performed by: SPEECH-LANGUAGE PATHOLOGIST

## 2018-05-21 PROCEDURE — A9270 NON-COVERED ITEM OR SERVICE: HCPCS | Mod: GY | Performed by: PHYSICIAN ASSISTANT

## 2018-05-21 PROCEDURE — 25000132 ZZH RX MED GY IP 250 OP 250 PS 637: Mod: GY | Performed by: PHYSICIAN ASSISTANT

## 2018-05-21 PROCEDURE — A9270 NON-COVERED ITEM OR SERVICE: HCPCS | Mod: GY | Performed by: PHYSICAL MEDICINE & REHABILITATION

## 2018-05-21 PROCEDURE — 97535 SELF CARE MNGMENT TRAINING: CPT | Mod: GO

## 2018-05-21 PROCEDURE — 97127 ZZHC SP THERAPEUTIC INTERVENTION W/FOCUS ON COGNITIVE FUNCTION,EA 15 MIN: CPT | Mod: GN | Performed by: SPEECH-LANGUAGE PATHOLOGIST

## 2018-05-21 PROCEDURE — 40000193 ZZH STATISTIC PT WARD VISIT

## 2018-05-21 PROCEDURE — 40000133 ZZH STATISTIC OT WARD VISIT

## 2018-05-21 PROCEDURE — 92526 ORAL FUNCTION THERAPY: CPT | Mod: GN | Performed by: SPEECH-LANGUAGE PATHOLOGIST

## 2018-05-21 PROCEDURE — 97110 THERAPEUTIC EXERCISES: CPT | Mod: GP

## 2018-05-21 PROCEDURE — 97116 GAIT TRAINING THERAPY: CPT | Mod: GP

## 2018-05-21 RX ADMIN — Medication 12.5 MG: at 08:09

## 2018-05-21 RX ADMIN — ATORVASTATIN CALCIUM 20 MG: 10 TABLET, FILM COATED ORAL at 20:35

## 2018-05-21 RX ADMIN — FINASTERIDE 5 MG: 5 TABLET, FILM COATED ORAL at 08:09

## 2018-05-21 RX ADMIN — Medication 12.5 MG: at 20:37

## 2018-05-21 RX ADMIN — APIXABAN 5 MG: 2.5 TABLET, FILM COATED ORAL at 20:36

## 2018-05-21 RX ADMIN — APIXABAN 5 MG: 2.5 TABLET, FILM COATED ORAL at 08:09

## 2018-05-21 RX ADMIN — TAMSULOSIN HYDROCHLORIDE 0.4 MG: 0.4 CAPSULE ORAL at 20:36

## 2018-05-21 RX ADMIN — PSYLLIUM HUSK 1 PACKET: 3.4 POWDER ORAL at 08:09

## 2018-05-21 NOTE — PROGRESS NOTES
"FOCUS/GOAL  Bladder management, Nutrition/Feeding/Swallowing precautions, Pain management, Mobility and Safety management    ASSESSMENT, INTERVENTIONS AND CONTINUING PLAN FOR GOAL:  Cognitive -- alert and answers questions appropriately , able to follow instructions and not impulsive , noted that he is not using the call light staff anticipate his needs [place] able to understand with clear modulated tone of voice used pocket talker occasionally .  Nutrition feeding / swallowing precautions --  Pt was up on the chair for supper , ate 100 % of his dinner  After set up need encouragement to drink more fluids , tolerated his dinner well  But at HS when pt was on bed with HOB up on almost 90 % staff noted some coughing episodes when he took his medications with applesauce and thin liquids  Staff gave him nectar thickened liquids with his second scoop of applesauce with the medications , no coughing noted pt said it went down well pt called the nectar thickened liquid as \" heavy water \" RN passed it on the report to let  The Speech therapist know about it in AM .  Transfer,  toilet transfer -- ambulated to the toilet with CGA using walker and transferred to the toilet with steadying assist ,needs help with both legs when getting back to bed and assist of 2 to boost up and 1 to turn and repositioned   Toileting -- assist of one with pulling up and down pants and pull up , pericare and applying clean brief   Undressing , dressing , grooming , skin care -- total assist with removing Tshirt and pants to change to a hospital gown , pt was able to wash face after the wash cloth was handed to him brush teeth with set up and cueing , he was also given a sponge bath because he declined to have a shower , total assist with sponge bath .  Skin integrity -- noted yesterday a cut on the back area of the penis   Wound is almost closed when staff check during pericare , wound care was done , and condomn cath was applied , voided 75 ml " clear fco urine an hour after the condom cath was applied PVR was 217.  Bed alarm on  Pt was instructed to use the call light if he needs assistance with anything

## 2018-05-21 NOTE — PLAN OF CARE
Problem: Goal/Outcome  Goal: Goal Outcome Summary   Patient is alert and oriented. Hard of hearing but does have a pocket talker that can utilize. Continue on dd2 diet with thin liquids, Appetite is good, independent with eating after set up.Taking meds crushed with applesauce. Cga for transfers and ambulation with gait belt and walker. No impulsivity noted, alarms discontinued. Potential discharge tomorrow, depending if he is going to need  Bed/chair alarms or not, for he needs to be alarm free for 24 hrs prior to discharge per SW. Doesn't really use call light to make needs known, staff to anticipate needs. WIll continue with poc.

## 2018-05-21 NOTE — PLAN OF CARE
Problem: Individualization  Goal: Patient Individualization  Outcome: No Change  Alert and oriented to self, place and time, condom catheter intact and draining all night, 500 cc put out and scan for 200, no open area noted on patients penis, patient slept most of the night, will continue POC.

## 2018-05-21 NOTE — PLAN OF CARE
Problem: Patient Care Overview  Goal: Plan of Care/Patient Progress Review  Pt seen for a meal f/u for swallowing- Pt remains on DD2 with thin liquids. Pt is tolerating thin liquids with no s/s of aspiration- however with DD2 solids- pt is noted to have some intermittent coughing or throat clearing near the end of the meal-- suspect more related to pharyngeal retention ( as evidenced on recent VFSS)- Pt educated to slow rate, take smaller bites sizes and to take more frequent breaks as well as multiple swallows and alternating with sips of liquids- this was effective in controlling for throat clearing- but pt needs min reminders to consistently use these strategies. Recommend continue with current DD2 diet with thin liquids with the above strategies. Pt will d/c to TCU on this diet- may be the highest diet pt can advance to given pharyngeal retention and also dentition issues          Completed task on I-pad targeting pre-palnning, divided attention and problem solving- Flow free : advance d from a 5x5 grid all the way u to a 7x7 grid- was able to cmplete inependently folowing an intiial review- but needed mildly increased time to complete. Completed Memory Match game on I-pad- pt completed 4x4 and a 4x5 grid-- needed  cues to aid in recall of where the card matches were

## 2018-05-21 NOTE — PLAN OF CARE
Problem: Goal/Outcome  Goal: Goal Outcome Summary  Social Work Services Progress Note    Hospital Day: 19  Collaborated with:  Pt's daughter-Shae, TCU admissions     Data:  Received call from pt's daughter, Shae. She said that they decided on [facility] and would ideally like a private room, but are open to him going to a shared room with the hopes of transferring when a private room becomes available. Inquired about transportation and Shae reported that family will transport. Noted that I would follow up with facility to determine when/if they're still able to accept.     Spoke with Thais in admissions at [facility] and updated on decision. Thais said that they do not have any beds available today, but may tomorrow, will know by the end of the day. Agreed to touch base later and will tentatively plan on tmrw for d/c, if not tmrw, they'd likely have bed on Wednesday.     Received call back from Thais at [facility] wondering if pt was still on a bed alarm, and if so, he'd need to be off for 24 hours prior to admission to their TCU. Noted I would check in team huddle and get back to her.     Spoke with bedside RN who reported that pt hasn't been impulsive/trying to get up, so will discontinue bed alarm. Left voicemail for admissions at [facility] to update and requested to tentatively plan on admission tmrw. Requested call back to confirm. Also left voicemail for pt's daughter, Shae, requesting call back to discuss. Noted that if we didn't connect today, SW will give her a call in the morning to follow up.     Intervention:  Discharge planning     Assessment:  Pt's daughter was very pleasant and receptive to SW on the phone.    Plan:    Anticipated Disposition:  Facility - TCU     Barriers to d/c plan:  Bed availability at [facility]     Follow Up:  SW will continue to follow, support and assist with ongoing social service and discharge planning needs.      DEBBY Navarro, [facility]  - Coverage for Acute Rehabilitation Center  Pager: [phone]  [email or web address]     NO LETTER

## 2018-05-22 VITALS
WEIGHT: 145.6 LBS | TEMPERATURE: 95.3 F | SYSTOLIC BLOOD PRESSURE: 108 MMHG | OXYGEN SATURATION: 98 % | DIASTOLIC BLOOD PRESSURE: 60 MMHG | HEIGHT: 71 IN | HEART RATE: 69 BPM | RESPIRATION RATE: 16 BRPM | BODY MASS INDEX: 20.38 KG/M2

## 2018-05-22 LAB
ANION GAP SERPL CALCULATED.3IONS-SCNC: 8 MMOL/L (ref 3–14)
BUN SERPL-MCNC: 25 MG/DL (ref 7–30)
CALCIUM SERPL-MCNC: 8.9 MG/DL (ref 8.5–10.1)
CHLORIDE SERPL-SCNC: 107 MMOL/L (ref 94–109)
CO2 SERPL-SCNC: 28 MMOL/L (ref 20–32)
CREAT SERPL-MCNC: 1.07 MG/DL (ref 0.66–1.25)
ERYTHROCYTE [DISTWIDTH] IN BLOOD BY AUTOMATED COUNT: 13.5 % (ref 10–15)
GFR SERPL CREATININE-BSD FRML MDRD: 65 ML/MIN/1.7M2
GLUCOSE SERPL-MCNC: 213 MG/DL (ref 70–99)
HCT VFR BLD AUTO: 42.2 % (ref 40–53)
HGB BLD-MCNC: 14.1 G/DL (ref 13.3–17.7)
MCH RBC QN AUTO: 30.7 PG (ref 26.5–33)
MCHC RBC AUTO-ENTMCNC: 33.4 G/DL (ref 31.5–36.5)
MCV RBC AUTO: 92 FL (ref 78–100)
PLATELET # BLD AUTO: 202 10E9/L (ref 150–450)
POTASSIUM SERPL-SCNC: 4.2 MMOL/L (ref 3.4–5.3)
RBC # BLD AUTO: 4.6 10E12/L (ref 4.4–5.9)
SODIUM SERPL-SCNC: 143 MMOL/L (ref 133–144)
WBC # BLD AUTO: 5.9 10E9/L (ref 4–11)

## 2018-05-22 PROCEDURE — 97116 GAIT TRAINING THERAPY: CPT | Mod: GP

## 2018-05-22 PROCEDURE — A9270 NON-COVERED ITEM OR SERVICE: HCPCS | Mod: GY | Performed by: PHYSICIAN ASSISTANT

## 2018-05-22 PROCEDURE — 25000132 ZZH RX MED GY IP 250 OP 250 PS 637: Mod: GY | Performed by: PHYSICAL MEDICINE & REHABILITATION

## 2018-05-22 PROCEDURE — 97110 THERAPEUTIC EXERCISES: CPT | Mod: GP

## 2018-05-22 PROCEDURE — 36415 COLL VENOUS BLD VENIPUNCTURE: CPT | Performed by: PHYSICAL MEDICINE & REHABILITATION

## 2018-05-22 PROCEDURE — 97535 SELF CARE MNGMENT TRAINING: CPT | Mod: GO

## 2018-05-22 PROCEDURE — 80048 BASIC METABOLIC PNL TOTAL CA: CPT | Performed by: PHYSICAL MEDICINE & REHABILITATION

## 2018-05-22 PROCEDURE — 92526 ORAL FUNCTION THERAPY: CPT | Mod: GN | Performed by: SPEECH-LANGUAGE PATHOLOGIST

## 2018-05-22 PROCEDURE — 25000132 ZZH RX MED GY IP 250 OP 250 PS 637: Mod: GY | Performed by: PHYSICIAN ASSISTANT

## 2018-05-22 PROCEDURE — A9270 NON-COVERED ITEM OR SERVICE: HCPCS | Mod: GY | Performed by: PHYSICAL MEDICINE & REHABILITATION

## 2018-05-22 PROCEDURE — 40000225 ZZH STATISTIC SLP WARD VISIT: Performed by: SPEECH-LANGUAGE PATHOLOGIST

## 2018-05-22 PROCEDURE — 40000193 ZZH STATISTIC PT WARD VISIT

## 2018-05-22 PROCEDURE — 85027 COMPLETE CBC AUTOMATED: CPT | Performed by: PHYSICAL MEDICINE & REHABILITATION

## 2018-05-22 PROCEDURE — 40000133 ZZH STATISTIC OT WARD VISIT

## 2018-05-22 RX ADMIN — PSYLLIUM HUSK 1 PACKET: 3.4 POWDER ORAL at 08:56

## 2018-05-22 RX ADMIN — APIXABAN 5 MG: 2.5 TABLET, FILM COATED ORAL at 08:55

## 2018-05-22 RX ADMIN — FINASTERIDE 5 MG: 5 TABLET, FILM COATED ORAL at 08:59

## 2018-05-22 NOTE — PLAN OF CARE
Problem: Goal/Outcome  Goal: Goal Outcome Summary  Social Work Services Discharge Note      Patient Name:  Angelito Castellanos     Anticipated Discharge Date:  Tues 5/22/18. Family will transport between 2-3p.     Discharge Disposition:   [facility]U:  [facility] (Ph. [phone], F. [phone])    Following MD:  House     Pre-Admission Screening (PAS) online form has been completed.  The Level of Care (LOC) is:  Determined  Confirmation Code is:  [id number]  Patient/caregiver informed of referral to [facility] Line for Pre-Admission Screening for skilled nursing facility (SNF) placement and to expect a phone call post discharge from SNF.     Additional Services/Equipment Arranged:  NA     Patient / Family response to discharge plan:  Agreeable      Persons notified of above discharge plan:  MD    Staff Discharge Instructions:  Please fax discharge orders and signed hard scripts for any controlled substances.  Please print a packet and send with patient.     CTS Handoff completed:  NO    Medicare Notice of Rights provided to the patient/family:  NO    Confirmed with admissions at TCU that they're able to accept pt into a private room today, as pt has been able to remain off bed alarm. Spoke with pt's daughter, Shae, and confirmed d/c plans with her. Pt will be off bed alarm this afternoon and daughter is able to transport sometime between 2-3p. Left voicemail at [facility] to update on d/c time.     DEBBY Navarro, [facility]  [facility]  - Coverage for Acute Rehabilitation Center  Pager: [phone]  [email or web address]     NO LETTER

## 2018-05-22 NOTE — PLAN OF CARE
FOCUS/GOAL  Bowel management, Bladder management, Discharge planning, Mobility, Skin integrity and Cognition/Memory/Judgment/Problem solving    ASSESSMENT, INTERVENTIONS AND CONTINUING PLAN FOR GOAL:  Cognition -- FAUSTO understands instructions with some repetition even without a pocket talker if staff speak to him in closely in a low tone of voice , used callight after he finished eating his dinner to ask for assistance to go to the toilet .  Mobility / toilet transfer / toileting -- ambulated by staff to the toilet with FWW and gait beltCGA  Cues for pt to slow down , steadying assist to get on/off toilet  He lower down his disposable brief and pants with steadying assist from staff , able to wipe periarea after having BM , staff has to finished wiping to make sure periarea is clean , assist of one with applying new incontinent brief , for undressing -- need assist of one to take off shirt and pants to change to a hospital gown before he goes to bed ..  Bowel and bladder management -- had a small stool in the toilet , pt was already incontinent of medium amount of urine on the pad when assisted to the toilet but also voided in the toilet , then condom catheter was applied at hs .  Skin integrity -- with healing wound on back part of the penis , cleaned with soap and water pat dry before applying condom cath   Safety management -- reminded pt to use the callight for assistance   Eating -- pt was up on the chair fed self after set up tolerated thin liquids , but when he has to take his HS medications he was up on bed with the HOB up to 90 degrees staff gave him his pills mixed with apple sauce and nectar thickened liquids and instructed on safe swallowing strategies  No coughing noted with swallowing pills unlike the previous night that he coughed on thin liquids when taking his medications and Speech therapist was updated with that episode and recommended to take his medications with nectar thickened fluids if  coughing on thin liquids but give him thin liquids when eating and pt should be up on the chair .  Discharge plan -- to TCU pt needs 24 hr supervision for safety due to short term memory loss , assistance with ADLs and medication management

## 2018-05-22 NOTE — PROGRESS NOTES
[facility], [place]   Physical Medicine and Rehabilitation Daily Note           Assessment and Plan of Care:   Mr. Castellanos is a 89 year old male with a history of afib s/p PPM, BPH, and HTN who was admitted to ARU on 5/3 following a right MCA stroke.     --Vitals stable. No labs today.  --Continue ongoing medical management.  --Continue therapies and plan of care.             Interval history:   Patient seen and examined at bedside. He is hard of hearing. Tolerating DD2 diet with thin liquids. He appears to be on track for discharge tomorrow. Denies fever, chills, CP, SOB, N/V, abdominal pain, new pain or weakness/numbness/tingling.             Physical Exam:   VS:   Vitals:    05/21/18 0600 05/21/18 0809 05/21/18 1549 05/21/18 2022   BP: (!) 143/91 113/73 102/66 121/80   BP Location: Right arm  Right arm Right arm   Pulse: 79 72 85    Resp: 16  16    Temp: 95.9  F (35.5  C)  96  F (35.6  C)    TempSrc: Oral  Oral    SpO2: 95%  97%    Weight:       Height:         Gen: NAD, resting comfortably in bedside chair  HEENT: [place], moist mucus membranes  Lungs: breathing unlabored on room air  Ext: no edema in BLE  MSK/neuro: Alert and oriented, speech fluent, moves all four extremities volitionally.          Data:   Scheduled meds    apixaban ANTICOAGULANT  5 mg Oral BID     atorvastatin  20 mg Oral Daily     barium sulfate 40%   Oral Once     diclofenac   Transdermal Q8H    And     diclofenac   Transdermal BID     finasteride  5 mg Oral Daily     metoprolol tartrate  12.5 mg Oral BID     psyllium  1 packet Oral Daily     tamsulosin  0.4 mg Oral QPM       PRN meds:  acetaminophen, bisacodyl, diclofenac **AND** diclofenac **AND** diclofenac, lidocaine 2 %, polyethylene glycol, senna-docusate      Idania Hernandez  Physical Medicine and Rehabilitation     I spent a total of 15 minutes face-to-face and managing the care of Angelito Castellanos. Over 50% of my time on the unit was spent counseling the  patient and coordinating care. Please see note for details.

## 2018-05-22 NOTE — PLAN OF CARE
Problem: Goal/Outcome  Goal: Goal Outcome Summary  Outcome: No Change  FOCUS/GOAL  Bladder management and Pain management    ASSESSMENT, INTERVENTIONS AND CONTINUING PLAN FOR GOAL:  Pt slept well all NOC, denies pain or discomfort. Using condom catheter at NOC, draining ok. Pt did not have alarms on all NOC, pt was not impulsive and did not try to self transfer. NSG to continue monitoring.

## 2018-05-22 NOTE — PLAN OF CARE
Problem: Goal/Outcome  Goal: Goal Outcome Summary  Outcome: Adequate for Discharge Date Met: 05/22/18  FOCUS/GOAL  Bladder management, Medical management and Mobility    ASSESSMENT, INTERVENTIONS AND CONTINUING PLAN FOR GOAL:  Pt's metoprolol held this morning per ordered parameters. Rechecked at noon and it was 108/60. Pt only drinks sips at a time. Needs encouragement to drink. Ate well for breakfast and lunch. Pt continent of bladder with staff offering toileting every 2-3 hours. Pt needing CGA with transfers and walking using walker due to left sided weakness. Pt didn't use the call light for assist so staff anticipating his needs.Pt didn't self transfer. Blanchable erythema on coccyx. No wound noted on penis area. Pt picked up by family at 1240 to transport to TCU.

## 2018-05-22 NOTE — PLAN OF CARE
Problem: Patient Care Overview  Goal: Plan of Care/Patient Progress Review  Outcome: Adequate for Discharge Date Met: 05/22/18  Speech Language Therapy Discharge Summary    Reason for therapy discharge:    Discharged to transitional care facility.    Progress towards therapy goal(s). See goals on Care Plan in [facility] electronic health record for goal details.  Goals met    Therapy recommendation(s):    Continued therapy is recommended.  Rationale/Recommendations:  SLP: pt is discharging to TCU.  Treatment has focused on cognitive communication skills and swallowing. Pt is on NDD2 diet, and thin fluids.  Recent VFSS completed (see report), and pt upgraded to thin.  He does have intermittent coughing on thin liquids, and also noted per pt/family at baseline.  He has difficulty chewing solids and has some pharyngeal stasis noted on VFSS.  Likely this is highest diet level safe for pt.  Recommend followup to reinforce small sips, and bites, take time , extra swallows, frequent sips during meal.  Pt also has mild word finding difficulty and mild/moderate deficits for problem solving,/memory.  Difficult to know clearly if he is nearing baseline/extend to cognitive challenges prior to CVA. Recommended 24 hour support - TCU at this time.  .

## 2018-05-22 NOTE — PROGRESS NOTES
[facility]  PRIMARY CARE PROVIDER AND CLINIC:  Gerardo Adhikari [facility] [address] [facility] [address] / [facility] [address]  Chief Complaint   Patient presents with     Hospital F/U     [facility] Medical Record Number:  [id number]    HPI:    Angelito Castellanos is a 89 year old  (12/23/1925),admitted to the [facility] from [facility].  Hospital stay 5/3/18 through 5/22/18.  Admitted to this facility for  rehab, medical management and nursing care.  HPI information obtained from: facility chart records, facility staff, patient report and [facility] chart review.       Patient Angelito Castellanos is a 89 yr old male admitted to [facility] s/p hospitalization [facility] 5/3-5/22/18 for acute ischemic stroke with history chronic Afib, pacemaker, hypertension & urinary retention      Current issues are:        Acute ischemic stroke with large vessel occlusion   CT Head 4/30: Evolving ischemic infarcts in the right and left middle cerebral artery territories.Diffuse cerebral volume loss and cerebral white matter changes consistent with chronic small vessel ischemic disease   Presented with left hemiparesis, dysarthria & dysphagia   Now diet thin DD2, continue with speech therapy   Now on Apixaban, had been on aspirin prior  On lipitor  follow up neurology ([facility])  hypertension, Afib  On Metoprolol  On Apixaban, has PPM    Urinary retention  On flomax & finasteride, has urinary incontinence; negative UC  follow up urology  Checking post void residual, patient denies urinary retention or signs and symptoms UTI  Toilet program    Constipation  Patient report has been 2 days since bowel movement  Request schedule bowel movement medications     Advanced care planning  Reviewed goals & wishes & patient desires to be DNR/DNI    CODE STATUS/ADVANCE DIRECTIVES DISCUSSION:   DNR / DNI  Patient's living condition: lives with adult children    ALLERGIES:Review of patient's  allergies indicates no known allergies.  PAST MEDICAL HISTORY:  has a past medical history of Chronic atrial fibrillation (H); [place] (hard of hearing); and Prediabetes.  PAST SURGICAL HISTORY:  has a past surgical history that includes Cardiac surgery; appendectomy; and nasal/sinus polypectomy.  FAMILY HISTORY: family history includes CEREBROVASCULAR DISEASE in an other family member; Coronary Artery Disease in his father.  SOCIAL HISTORY:  reports that he has quit smoking. He does not have any smokeless tobacco history on file. He reports that he drinks alcohol. He reports that he does not use illicit drugs.    Post Discharge Medication Reconciliation Status: discharge medications reconciled and changed, per note/orders (see AVS).  Current Outpatient Prescriptions   Medication Sig Dispense Refill     acetaminophen (TYLENOL) 325 MG tablet Take 2 tablets (650 mg) by mouth every 6 hours as needed for mild pain 100 tablet      apixaban ANTICOAGULANT (ELIQUIS) 5 MG tablet Take 1 tablet (5 mg) by mouth 2 times daily       atorvastatin (LIPITOR) 20 MG tablet Take 1 tablet (20 mg) by mouth daily 90 tablet 1     bisacodyl (DULCOLAX) 10 MG Suppository Place 1 suppository (10 mg) rectally daily as needed for constipation 30 suppository      finasteride (PROSCAR) 5 MG tablet Take 1 tablet (5 mg) by mouth daily 30 tablet      metoprolol tartrate (LOPRESSOR) 25 MG tablet Take 0.5 tablets (12.5 mg) by mouth 2 times daily 60 tablet      psyllium (METAMUCIL/KONSYL) Packet Take 1 packet by mouth daily       senna-docusate (SENOKOT-S;PERICOLACE) 8.6-50 MG per tablet Take 1-2 tablets by mouth 2 times daily as needed for constipation 100 tablet      tamsulosin (FLOMAX) 0.4 MG capsule Take 1 capsule (0.4 mg) by mouth every evening 60 capsule        ROS:  10 point ROS of systems including Constitutional, Eyes, Respiratory, Cardiovascular, Gastroenterology, Genitourinary, Integumentary, Muscularskeletal, Psychiatric were all negative except  "for pertinent positives noted in my HPI.    Exam:  /80  Pulse 78  Temp 96.1  F (35.6  C)  Resp 16  Ht 5' 10\" (1.778 m)  Wt 152 lb 8 oz (69.2 kg)  SpO2 97%  BMI 21.88 kg/m2  GENERAL APPEARANCE:  Alert, in no distress  ENT:  Mouth and posterior oropharynx normal, moist mucous membranes, [place]  EYES:  EOM, conjunctivae, lids, pupils and irises normal  NECK:  No adenopathy,masses or thyromegaly  RESP:  respiratory effort and palpation of chest normal, lungs clear to auscultation, no respiratory distress  CV:  Palpation and auscultation of heart done, regular rate and rhythm, no murmur, rub, or gallop  ABDOMEN:  normal bowel sounds, soft, nontender, no hepatosplenomegaly or other masses  M/S:   lying in bed  SKIN:  Inspection of skin and subcutaneous tissue baseline, no edema in lower extremity noted  NEURO:   Cranial nerves 2-12 are normal tested and grossly at patient's baseline  PSYCH:  oriented X 3    BP Readings from Last 3 Encounters:   05/23/18 115/80   05/22/18 108/60   05/03/18 124/72     Pulse Readings from Last 4 Encounters:   05/23/18 78   05/22/18 69   05/03/18 72     Wt Readings from Last 5 Encounters:   05/23/18 152 lb 8 oz (69.2 kg)   05/18/18 145 lb 9.6 oz (66 kg)   05/03/18 124 lb 9.6 oz (56.5 kg)       Lab/Diagnostic data:  CBC RESULTS:   Recent Labs   Lab Test  05/22/18   1014  05/05/18   0632   WBC  5.9  6.8   RBC  4.60  4.39*   HGB  14.1  13.4   HCT  42.2  39.3*   MCV  92  90   MCH  30.7  30.5   MCHC  33.4  34.1   RDW  13.5  13.9   PLT  202  159       Last Basic Metabolic Panel:  Recent Labs   Lab Test  05/22/18   1014  05/05/18   0632   NA  143  144   POTASSIUM  4.2  3.8   CHLORIDE  107  110*   EDMUNDO  8.9  8.5   CO2  28  24   BUN  25  23   CR  1.07  0.89   GLC  213*  107*       TSH   Date Value Ref Range Status   04/29/2018 2.33 0.40 - 4.00 mU/L Final       Lab Results   Component Value Date    A1C 7.0 04/29/2018           ASSESSMENT/PLAN:  Acute embolic stroke (H)  Atrial fibrillation, " unspecified type (H)  Hypertension, unspecified type  Stable, no unilateral weakness noted, not appear confused,   Working with speech therapy due to dysphagia, no signs and symptoms aspiration noted  Vitals stable, hr managed  Continue on current med Apixaban for anticoagulation  Continue on Metoprolol     BPH  Continue on Flomax and Proscar  Monitor post void residual   Follow up urology    Constipation  No bowel movement 2 days, Senna S 2 tabs daily schedule, monitor           Total time spent with patient visit at the [facility] nursing facility was 40min including patient visit and review of past records. Greater than 50% of total time spent with counseling and coordinating care due to patient status, recent stroke and comorbidity     Electronically signed by:  Daniela Alvarez

## 2018-05-23 ENCOUNTER — NURSING HOME VISIT (OUTPATIENT)
Dept: GERIATRICS | Facility: CLINIC | Age: 83
End: 2018-05-23
Payer: COMMERCIAL

## 2018-05-23 VITALS
SYSTOLIC BLOOD PRESSURE: 115 MMHG | RESPIRATION RATE: 16 BRPM | HEIGHT: 70 IN | TEMPERATURE: 96.1 F | HEART RATE: 78 BPM | BODY MASS INDEX: 21.83 KG/M2 | OXYGEN SATURATION: 97 % | DIASTOLIC BLOOD PRESSURE: 80 MMHG | WEIGHT: 152.5 LBS

## 2018-05-23 DIAGNOSIS — I48.91 ATRIAL FIBRILLATION, UNSPECIFIED TYPE (H): ICD-10-CM

## 2018-05-23 DIAGNOSIS — I63.9 ACUTE EMBOLIC STROKE (H): Primary | ICD-10-CM

## 2018-05-23 DIAGNOSIS — I10 HYPERTENSION, UNSPECIFIED TYPE: ICD-10-CM

## 2018-05-23 PROCEDURE — 99310 SBSQ NF CARE HIGH MDM 45: CPT | Performed by: NURSE PRACTITIONER

## 2018-05-23 NOTE — LETTER
5/23/2018        RE: Angelito Castellanos  [address]  [facility] [address]        [facility]  PRIMARY CARE PROVIDER AND CLINIC:  [facility] [address] [facility] [address] / [facility] [address]  Chief Complaint   Patient presents with     Hospital F/U     [place] Medical Record Number:  [id number]    HPI:    Angelito Castellanos is a 89 year old  (12/23/1925),admitted to the [facility] from [facility].  Hospital stay 5/3/18 through 5/22/18.  Admitted to this facility for  rehab, medical management and nursing care.  HPI information obtained from: facility chart records, facility staff, patient report and [facility] chart review.       Patient Angelito Castellanos is a 89 yr old male admitted to [facility] s/p hospitalization [facility] 5/3-5/22/18 for acute ischemic stroke with history chronic Afib, pacemaker, hypertension & urinary retention      Current issues are:        Acute ischemic stroke with large vessel occlusion   CT Head 4/30: Evolving ischemic infarcts in the right and left middle cerebral artery territories.Diffuse cerebral volume loss and cerebral white matter changes consistent with chronic small vessel ischemic disease   Presented with left hemiparesis, dysarthria & dysphagia   Now diet thin DD2, continue with speech therapy   Now on Apixaban, had been on aspirin prior  On lipitor  follow up neurology (P)  hypertension, Afib  On Metoprolol  On Apixaban, has PPM    Urinary retention  On flomax & finasteride, has urinary incontinence; negative UC  follow up urology  Checking post void residual, patient denies urinary retention or signs and symptoms UTI  Toilet program    Constipation  Patient report has been 2 days since bowel movement  Request schedule bowel movement medications     Advanced care planning  Reviewed goals & wishes & patient desires to be DNR/DNI    CODE STATUS/ADVANCE DIRECTIVES DISCUSSION:   DNR /  DNI  Patient's living condition: lives with adult children    ALLERGIES:Review of patient's allergies indicates no known allergies.  PAST MEDICAL HISTORY:  has a past medical history of Chronic atrial fibrillation (H); [place] (hard of hearing); and Prediabetes.  PAST SURGICAL HISTORY:  has a past surgical history that includes Cardiac surgery; appendectomy; and nasal/sinus polypectomy.  FAMILY HISTORY: family history includes CEREBROVASCULAR DISEASE in an other family member; Coronary Artery Disease in his father.  SOCIAL HISTORY:  reports that he has quit smoking. He does not have any smokeless tobacco history on file. He reports that he drinks alcohol. He reports that he does not use illicit drugs.    Post Discharge Medication Reconciliation Status: discharge medications reconciled and changed, per note/orders (see AVS).  Current Outpatient Prescriptions   Medication Sig Dispense Refill     acetaminophen (TYLENOL) 325 MG tablet Take 2 tablets (650 mg) by mouth every 6 hours as needed for mild pain 100 tablet      apixaban ANTICOAGULANT (ELIQUIS) 5 MG tablet Take 1 tablet (5 mg) by mouth 2 times daily       atorvastatin (LIPITOR) 20 MG tablet Take 1 tablet (20 mg) by mouth daily 90 tablet 1     bisacodyl (DULCOLAX) 10 MG Suppository Place 1 suppository (10 mg) rectally daily as needed for constipation 30 suppository      finasteride (PROSCAR) 5 MG tablet Take 1 tablet (5 mg) by mouth daily 30 tablet      metoprolol tartrate (LOPRESSOR) 25 MG tablet Take 0.5 tablets (12.5 mg) by mouth 2 times daily 60 tablet      psyllium (METAMUCIL/KONSYL) Packet Take 1 packet by mouth daily       senna-docusate (SENOKOT-S;PERICOLACE) 8.6-50 MG per tablet Take 1-2 tablets by mouth 2 times daily as needed for constipation 100 tablet      tamsulosin (FLOMAX) 0.4 MG capsule Take 1 capsule (0.4 mg) by mouth every evening 60 capsule        ROS:  10 point ROS of systems including Constitutional, Eyes, Respiratory, Cardiovascular,  "Gastroenterology, Genitourinary, Integumentary, Muscularskeletal, Psychiatric were all negative except for pertinent positives noted in my HPI.    Exam:  /80  Pulse 78  Temp 96.1  F (35.6  C)  Resp 16  Ht 5' 10\" (1.778 m)  Wt 152 lb 8 oz (69.2 kg)  SpO2 97%  BMI 21.88 kg/m2  GENERAL APPEARANCE:  Alert, in no distress  ENT:  Mouth and posterior oropharynx normal, moist mucous membranes, [place]  EYES:  EOM, conjunctivae, lids, pupils and irises normal  NECK:  No adenopathy,masses or thyromegaly  RESP:  respiratory effort and palpation of chest normal, lungs clear to auscultation, no respiratory distress  CV:  Palpation and auscultation of heart done, regular rate and rhythm, no murmur, rub, or gallop  ABDOMEN:  normal bowel sounds, soft, nontender, no hepatosplenomegaly or other masses  M/S:   lying in bed  SKIN:  Inspection of skin and subcutaneous tissue baseline, no edema in lower extremity noted  NEURO:   Cranial nerves 2-12 are normal tested and grossly at patient's baseline  PSYCH:  oriented X 3    BP Readings from Last 3 Encounters:   05/23/18 115/80   05/22/18 108/60   05/03/18 124/72     Pulse Readings from Last 4 Encounters:   05/23/18 78   05/22/18 69   05/03/18 72     Wt Readings from Last 5 Encounters:   05/23/18 152 lb 8 oz (69.2 kg)   05/18/18 145 lb 9.6 oz (66 kg)   05/03/18 124 lb 9.6 oz (56.5 kg)       Lab/Diagnostic data:  CBC RESULTS:   Recent Labs   Lab Test  05/22/18   1014  05/05/18   0632   WBC  5.9  6.8   RBC  4.60  4.39*   HGB  14.1  13.4   HCT  42.2  39.3*   MCV  92  90   MCH  30.7  30.5   MCHC  33.4  34.1   RDW  13.5  13.9   PLT  202  159       Last Basic Metabolic Panel:  Recent Labs   Lab Test  05/22/18   1014  05/05/18   0632   NA  143  144   POTASSIUM  4.2  3.8   CHLORIDE  107  110*   EDMUNDO  8.9  8.5   CO2  28  24   BUN  25  23   CR  1.07  0.89   GLC  213*  107*       TSH   Date Value Ref Range Status   04/29/2018 2.33 0.40 - 4.00 mU/L Final       Lab Results   Component Value " Date    A1C 7.0 04/29/2018           ASSESSMENT/PLAN:  Acute embolic stroke (H)  Atrial fibrillation, unspecified type (H)  Hypertension, unspecified type  Stable, no unilateral weakness noted, not appear confused,   Working with speech therapy due to dysphagia, no signs and symptoms aspiration noted  Vitals stable, hr managed  Continue on current med Apixaban for anticoagulation  Continue on Metoprolol     BPH  Continue on Flomax and Proscar  Monitor post void residual   Follow up urology    Constipation  No bowel movement 2 days, Senna S 2 tabs daily schedule, monitor           Total time spent with patient visit at the skilled nursing facility was 40min including patient visit and review of past records. Greater than 50% of total time spent with counseling and coordinating care due to patient status, recent stroke and comorbidity     Electronically signed by:  Daniela Alvarez                    Sincerely,        GREGORY Flanagan CNP

## 2018-05-24 PROBLEM — I10 HYPERTENSION, UNSPECIFIED TYPE: Status: ACTIVE | Noted: 2018-05-24

## 2018-05-24 PROBLEM — I48.91 ATRIAL FIBRILLATION, UNSPECIFIED TYPE (H): Status: ACTIVE | Noted: 2018-05-24

## 2018-05-24 NOTE — PROGRESS NOTES
[facility] CLARIFICATION NOTE FOR DISCHARGE  Lowest score for each FIM item supported by available charting: *FIM Scores taken from charting on 5/21/18    Bathing: FIM 5: Charting supports patient at level of distant supervision for actual shower during OT session  Upper Body Dressing: FIM 5: Charting supports patient at level of set up during OT session  Lower Body Dressing: FIM 5: Charting supports pt requiring CGA during OT session  Toileting: FIM 4: Charting supports pt requiring CGA; additional narrative note by nursing indicating total assist addressed incontinent episode.  Bladder:  FIM 1: Charting by RN indicates pt required total assist for brief change after incontinent into pad  Bladder Number of Accidents: 0  Bowel: FIM 6: Medications taken   Bowel Number of Accidents: 0  Locomotion distance: 165 feet  Locomotion: FIM 4: Min assist-CGA  Stairs: FIM 4: 20 steps with CGA  Comprehension: FIM 5: Charting describes pt as [place] and cues required for basic tasks occasionally  Expression: FIM 6: Expressed needs with increased time  Problem solving: FIM 5: Charting describes patient required assist for basic problem solving less than 10% of time  Memory: FIM 4: Prompting less than 25% of time for memory

## 2018-05-25 ENCOUNTER — HOSPITAL LABORATORY (OUTPATIENT)
Dept: OTHER | Facility: CLINIC | Age: 83
End: 2018-05-25

## 2018-05-25 LAB
ANION GAP SERPL CALCULATED.3IONS-SCNC: 6 MMOL/L (ref 3–14)
BUN SERPL-MCNC: 16 MG/DL (ref 7–30)
CALCIUM SERPL-MCNC: 8.5 MG/DL (ref 8.5–10.1)
CHLORIDE SERPL-SCNC: 108 MMOL/L (ref 94–109)
CO2 SERPL-SCNC: 29 MMOL/L (ref 20–32)
CREAT SERPL-MCNC: 1 MG/DL (ref 0.66–1.25)
ERYTHROCYTE [DISTWIDTH] IN BLOOD BY AUTOMATED COUNT: 13.8 % (ref 10–15)
GFR SERPL CREATININE-BSD FRML MDRD: 70 ML/MIN/1.7M2
GLUCOSE SERPL-MCNC: 116 MG/DL (ref 70–99)
HCT VFR BLD AUTO: 38.3 % (ref 40–53)
HGB BLD-MCNC: 13.1 G/DL (ref 13.3–17.7)
MCH RBC QN AUTO: 30.9 PG (ref 26.5–33)
MCHC RBC AUTO-ENTMCNC: 34.2 G/DL (ref 31.5–36.5)
MCV RBC AUTO: 90 FL (ref 78–100)
PLATELET # BLD AUTO: 166 10E9/L (ref 150–450)
POTASSIUM SERPL-SCNC: 3.9 MMOL/L (ref 3.4–5.3)
RBC # BLD AUTO: 4.24 10E12/L (ref 4.4–5.9)
SODIUM SERPL-SCNC: 143 MMOL/L (ref 133–144)
WBC # BLD AUTO: 6 10E9/L (ref 4–11)

## 2018-05-30 ENCOUNTER — NURSING HOME VISIT (OUTPATIENT)
Dept: GERIATRICS | Facility: CLINIC | Age: 83
End: 2018-05-30
Payer: COMMERCIAL

## 2018-05-30 VITALS
HEART RATE: 64 BPM | SYSTOLIC BLOOD PRESSURE: 107 MMHG | TEMPERATURE: 98.6 F | RESPIRATION RATE: 18 BRPM | WEIGHT: 153 LBS | HEIGHT: 70 IN | BODY MASS INDEX: 21.9 KG/M2 | DIASTOLIC BLOOD PRESSURE: 62 MMHG | OXYGEN SATURATION: 95 %

## 2018-05-30 DIAGNOSIS — Z53.9 ERRONEOUS ENCOUNTER--DISREGARD: Primary | ICD-10-CM

## 2018-05-30 RX ORDER — AMOXICILLIN 250 MG
2 CAPSULE ORAL 2 TIMES DAILY
COMMUNITY
End: 2020-01-01

## 2018-06-01 ENCOUNTER — NURSING HOME VISIT (OUTPATIENT)
Dept: GERIATRICS | Facility: CLINIC | Age: 83
End: 2018-06-01
Payer: COMMERCIAL

## 2018-06-01 VITALS
WEIGHT: 153 LBS | DIASTOLIC BLOOD PRESSURE: 68 MMHG | OXYGEN SATURATION: 96 % | BODY MASS INDEX: 21.9 KG/M2 | HEIGHT: 70 IN | SYSTOLIC BLOOD PRESSURE: 129 MMHG | HEART RATE: 81 BPM | TEMPERATURE: 96.5 F | RESPIRATION RATE: 18 BRPM

## 2018-06-01 DIAGNOSIS — I10 HYPERTENSION, UNSPECIFIED TYPE: ICD-10-CM

## 2018-06-01 DIAGNOSIS — I63.9 ACUTE EMBOLIC STROKE (H): Primary | ICD-10-CM

## 2018-06-01 DIAGNOSIS — R33.9 URINARY RETENTION: ICD-10-CM

## 2018-06-01 DIAGNOSIS — I48.91 ATRIAL FIBRILLATION, UNSPECIFIED TYPE (H): ICD-10-CM

## 2018-06-01 DIAGNOSIS — K59.09 OTHER CONSTIPATION: ICD-10-CM

## 2018-06-01 PROCEDURE — 99309 SBSQ NF CARE MODERATE MDM 30: CPT | Performed by: NURSE PRACTITIONER

## 2018-06-01 RX ORDER — TAMSULOSIN HYDROCHLORIDE 0.4 MG/1
0.4 CAPSULE ORAL EVERY EVENING
COMMUNITY

## 2018-06-01 NOTE — LETTER
6/1/2018        RE: Angelito Castellanos  [address]  [facility] [address]        [facility]    Chief Complaint   Patient presents with     snf Acute       [facility] Medical Record Number:  [id number]    HPI:    Angelito Castellanos is a 89 year old  (12/23/1925), who is being seen today for an episodic care visit at [facility].  HPI information obtained from: facility chart records, facility staff, patient report and [facility] chart review.Today's concern is:    Patient Angelito Castellanos is a 89 yr old male admitted to [facility]/p [facility] 5/3-5/22/[facility] for acute ischemic stroke with history chronic Afib, pacemaker, hypertension & urinary retention     Acute embolic stroke (H)  Atrial fibrillation, unspecified type (H)  Hypertension, unspecified type  Urinary retention  Other constipation     Progressing in therapies, does report left leg weak since hospital  States he had a bowel movement at [facility], however, c/o constipation. Had ordered scheduled Senna S, however, staff had transcription error & not given  blood pressure & hr managed  Has elevated post void residual at times near 250cc, denies discomfort  On flomax & proscar    ALLERGIES: Review of patient's allergies indicates no known allergies.  Past Medical, Surgical, Family and Social History reviewed and updated in [facility].    Current Outpatient Prescriptions   Medication Sig Dispense Refill     acetaminophen (TYLENOL) 325 MG tablet Take 2 tablets (650 mg) by mouth every 6 hours as needed for mild pain 100 tablet      apixaban ANTICOAGULANT (ELIQUIS) 5 MG tablet Take 1 tablet (5 mg) by mouth 2 times daily       atorvastatin (LIPITOR) 20 MG tablet Take 1 tablet (20 mg) by mouth daily 90 tablet 1     bisacodyl (DULCOLAX) 10 MG Suppository Place 1 suppository (10 mg) rectally daily as needed for constipation 30 suppository      finasteride (PROSCAR) 5 MG tablet Take 1 tablet (5 mg) by mouth daily 30 tablet       "metoprolol tartrate (LOPRESSOR) 25 MG tablet Take 0.5 tablets (12.5 mg) by mouth 2 times daily 60 tablet      senna-docusate (SENOKOT-S;PERICOLACE) 8.6-50 MG per tablet Take 2 tablets by mouth daily And 1-2 tabs bid prn        tamsulosin (FLOMAX) 0.4 MG capsule Take 0.8 mg by mouth every evening       Medications reviewed:  Medications reconciled to facility chart and changes were made to reflect current medications as identified as above med list. Below are the changes that were made:   Medications stopped since last EPIC medication reconciliation:   There are no discontinued medications.    Medications started since last [facility] medication reconciliation:  No orders of the defined types were placed in this encounter.      REVIEW OF SYSTEMS:  10 point ROS of systems including Constitutional, Eyes, Respiratory, Cardiovascular, Gastroenterology, Genitourinary, Integumentary, Muscularskeletal, Psychiatric were all negative except for pertinent positives noted in my HPI.    Physical Exam:  /68  Pulse 81  Temp 96.5  F (35.8  C)  Resp 18  Ht 5' 10\" (1.778 m)  Wt 153 lb (69.4 kg)  SpO2 96%  BMI 21.95 kg/m2  GENERAL APPEARANCE:  Alert, in no distress  ENT:  Mouth and posterior oropharynx normal, moist mucous membranes, uses pocket talker  EYES:  EOM, conjunctivae, lids, pupils and irises normal  NECK:  No adenopathy,masses or thyromegaly  RESP:  respiratory effort and palpation of chest normal, lungs clear to auscultation, no respiratory distress  CV:  Palpation and auscultation of heart done, regular rate and rhythm, no murmur, rub, or gallop  ABDOMEN:  normal bowel sounds, soft, nontender, no hepatosplenomegaly or other masses  M/S:   lying in bed  SKIN:  Inspection of skin and subcutaneous tissue baseline, no edema in legs  NEURO:   Cranial nerves 2-12 are normal tested and grossly at patient's baseline  PSYCH:  oriented X 3    BP Readings from Last 3 Encounters:   06/01/18 129/68   05/30/18 107/62   05/23/18 " 115/80     Pulse Readings from Last 4 Encounters:   06/01/18 81   05/30/18 64   05/23/18 78   05/22/18 69     Wt Readings from Last 5 Encounters:   06/01/18 153 lb (69.4 kg)   05/30/18 153 lb (69.4 kg)   05/23/18 152 lb 8 oz (69.2 kg)   05/18/18 145 lb 9.6 oz (66 kg)   05/03/18 124 lb 9.6 oz (56.5 kg)       Recent Labs:   CBC RESULTS:   Recent Labs   Lab Test  05/25/18   0607  05/22/18   1014   WBC  6.0  5.9   RBC  4.24*  4.60   HGB  13.1*  14.1   HCT  38.3*  42.2   MCV  90  92   MCH  30.9  30.7   MCHC  34.2  33.4   RDW  13.8  13.5   PLT  166  202       Last Basic Metabolic Panel:  Recent Labs   Lab Test  05/25/18   0607  05/22/18   1014   NA  143  143   POTASSIUM  3.9  4.2   CHLORIDE  108  107   EDMUNDO  8.5  8.9   CO2  29  28   BUN  16  25   CR  1.00  1.07   GLC  116*  213*       TSH   Date Value Ref Range Status   04/29/2018 2.33 0.40 - 4.00 mU/L Final       Lab Results   Component Value Date    A1C 7.0 04/29/2018         Assessment/Plan:     Acute embolic stroke (H)  Atrial fibrillation, unspecified type (H)  Hypertension, unspecified type  Urinary retention  Other constipation    blood pressure and hr managed, continue Eliquis & metoprolol, no signs and symptoms bleeding  Progressing in therapies, reports still weak in left leg he reports    Continue monitor post void residual, order to cath if post void residual >300cc or patient symptomatic, continue assess every 8 hrs & as needed  Increase flomax 0.8mg daily, give in evening to avoid hypotension, monitor for dizziness   Consider follow up urologist if signs and symptoms not improving (BPH vs neurogenic bladder)  Senna s 2 tabs day & 2 tabs day as needed, monitor         Electronically signed by  GREGORY Flanagan CNP                      Sincerely,        GREGORY Flanagan CNP

## 2018-06-01 NOTE — PROGRESS NOTES
[facility]    Chief Complaint   Patient presents with     [facility] Acute       [place] Medical Record Number:  [id number]    HPI:    Angelito Castellanos is a 89 year old  (12/23/1925), who is being seen today for an episodic care visit at [facility].  HPI information obtained from: facility chart records, facility staff, patient report and [facility] chart review.Today's concern is:    Patient Angelito Castellanos is a 89 yr old male admitted [facility] for acute ischemic stroke with history chronic Afib, pacemaker, hypertension & urinary retention     Acute embolic stroke (H)  Atrial fibrillation, unspecified type (H)  Hypertension, unspecified type  Urinary retention  Other constipation     Progressing in therapies, does report left leg weak since hospital  States he had a bowel movement at [facility], however, c/o constipation. Had ordered scheduled Senna S, however, staff had transcription error & not given  blood pressure & hr managed  Has elevated post void residual at times near 250cc, denies discomfort  On flomax & proscar    ALLERGIES: Review of patient's allergies indicates no known allergies.  Past Medical, Surgical, Family and Social History reviewed and updated in [facility].    Current Outpatient Prescriptions   Medication Sig Dispense Refill     acetaminophen (TYLENOL) 325 MG tablet Take 2 tablets (650 mg) by mouth every 6 hours as needed for mild pain 100 tablet      apixaban ANTICOAGULANT (ELIQUIS) 5 MG tablet Take 1 tablet (5 mg) by mouth 2 times daily       atorvastatin (LIPITOR) 20 MG tablet Take 1 tablet (20 mg) by mouth daily 90 tablet 1     bisacodyl (DULCOLAX) 10 MG Suppository Place 1 suppository (10 mg) rectally daily as needed for constipation 30 suppository      finasteride (PROSCAR) 5 MG tablet Take 1 tablet (5 mg) by mouth daily 30 tablet      metoprolol tartrate (LOPRESSOR) 25 MG tablet Take 0.5 tablets (12.5 mg) by mouth 2  "times daily 60 tablet      senna-docusate (SENOKOT-S;PERICOLACE) 8.6-50 MG per tablet Take 2 tablets by mouth daily And 1-2 tabs bid prn        tamsulosin (FLOMAX) 0.4 MG capsule Take 0.8 mg by mouth every evening       Medications reviewed:  Medications reconciled to facility chart and changes were made to reflect current medications as identified as above med list. Below are the changes that were made:   Medications stopped since last EPIC medication reconciliation:   There are no discontinued medications.    Medications started since last [facility] medication reconciliation:  No orders of the defined types were placed in this encounter.      REVIEW OF SYSTEMS:  10 point ROS of systems including Constitutional, Eyes, Respiratory, Cardiovascular, Gastroenterology, Genitourinary, Integumentary, Muscularskeletal, Psychiatric were all negative except for pertinent positives noted in my HPI.    Physical Exam:  /68  Pulse 81  Temp 96.5  F (35.8  C)  Resp 18  Ht 5' 10\" (1.778 m)  Wt 153 lb (69.4 kg)  SpO2 96%  BMI 21.95 kg/m2  GENERAL APPEARANCE:  Alert, in no distress  ENT:  Mouth and posterior oropharynx normal, moist mucous membranes, uses pocket talker  EYES:  EOM, conjunctivae, lids, pupils and irises normal  NECK:  No adenopathy,masses or thyromegaly  RESP:  respiratory effort and palpation of chest normal, lungs clear to auscultation, no respiratory distress  CV:  Palpation and auscultation of heart done, regular rate and rhythm, no murmur, rub, or gallop  ABDOMEN:  normal bowel sounds, soft, nontender, no hepatosplenomegaly or other masses  M/S:   lying in bed  SKIN:  Inspection of skin and subcutaneous tissue baseline, no edema in legs  NEURO:   Cranial nerves 2-12 are normal tested and grossly at patient's baseline  PSYCH:  oriented X 3    BP Readings from Last 3 Encounters:   06/01/18 129/68   05/30/18 107/62   05/23/18 115/80     Pulse Readings from Last 4 Encounters:   06/01/18 81   05/30/18 64 "   05/23/18 78   05/22/18 69     Wt Readings from Last 5 Encounters:   06/01/18 153 lb (69.4 kg)   05/30/18 153 lb (69.4 kg)   05/23/18 152 lb 8 oz (69.2 kg)   05/18/18 145 lb 9.6 oz (66 kg)   05/03/18 124 lb 9.6 oz (56.5 kg)       Recent Labs:   CBC RESULTS:   Recent Labs   Lab Test  05/25/18   0607  05/22/18   1014   WBC  6.0  5.9   RBC  4.24*  4.60   HGB  13.1*  14.1   HCT  38.3*  42.2   MCV  90  92   MCH  30.9  30.7   MCHC  34.2  33.4   RDW  13.8  13.5   PLT  166  202       Last Basic Metabolic Panel:  Recent Labs   Lab Test  05/25/18   0607  05/22/18   1014   NA  143  143   POTASSIUM  3.9  4.2   CHLORIDE  108  107   EDMUNDO  8.5  8.9   CO2  29  28   BUN  16  25   CR  1.00  1.07   GLC  116*  213*       TSH   Date Value Ref Range Status   04/29/2018 2.33 0.40 - 4.00 mU/L Final       Lab Results   Component Value Date    A1C 7.0 04/29/2018         Assessment/Plan:     Acute embolic stroke (H)  Atrial fibrillation, unspecified type (H)  Hypertension, unspecified type  Urinary retention  Other constipation    blood pressure and hr managed, continue Eliquis & metoprolol, no signs and symptoms bleeding  Progressing in therapies, reports still weak in left leg he reports    Continue monitor post void residual, order to cath if post void residual >300cc or patient symptomatic, continue assess every 8 hrs & as needed  Increase flomax 0.8mg daily, give in evening to avoid hypotension, monitor for dizziness   Consider follow up urologist if signs and symptoms not improving (BPH vs neurogenic bladder)  Senna s 2 tabs day & 2 tabs day as needed, monitor         Electronically signed by  GREGORY Flanagan CNP

## 2018-06-04 ENCOUNTER — HOSPITAL LABORATORY (OUTPATIENT)
Dept: OTHER | Facility: CLINIC | Age: 83
End: 2018-06-04

## 2018-06-04 LAB
ANION GAP SERPL CALCULATED.3IONS-SCNC: 5 MMOL/L (ref 3–14)
BUN SERPL-MCNC: 23 MG/DL (ref 7–30)
CALCIUM SERPL-MCNC: 8.3 MG/DL (ref 8.5–10.1)
CHLORIDE SERPL-SCNC: 110 MMOL/L (ref 94–109)
CO2 SERPL-SCNC: 29 MMOL/L (ref 20–32)
CREAT SERPL-MCNC: 0.93 MG/DL (ref 0.66–1.25)
ERYTHROCYTE [DISTWIDTH] IN BLOOD BY AUTOMATED COUNT: 14 % (ref 10–15)
GFR SERPL CREATININE-BSD FRML MDRD: 76 ML/MIN/1.7M2
GLUCOSE SERPL-MCNC: 120 MG/DL (ref 70–99)
HCT VFR BLD AUTO: 38.5 % (ref 40–53)
HGB BLD-MCNC: 13 G/DL (ref 13.3–17.7)
MCH RBC QN AUTO: 30.8 PG (ref 26.5–33)
MCHC RBC AUTO-ENTMCNC: 33.8 G/DL (ref 31.5–36.5)
MCV RBC AUTO: 91 FL (ref 78–100)
PLATELET # BLD AUTO: 146 10E9/L (ref 150–450)
POTASSIUM SERPL-SCNC: 3.8 MMOL/L (ref 3.4–5.3)
RBC # BLD AUTO: 4.22 10E12/L (ref 4.4–5.9)
SODIUM SERPL-SCNC: 144 MMOL/L (ref 133–144)
WBC # BLD AUTO: 6 10E9/L (ref 4–11)

## 2018-06-07 ENCOUNTER — NURSING HOME VISIT (OUTPATIENT)
Dept: GERIATRICS | Facility: CLINIC | Age: 83
End: 2018-06-07
Payer: COMMERCIAL

## 2018-06-07 DIAGNOSIS — I63.9 ACUTE EMBOLIC STROKE (H): Primary | ICD-10-CM

## 2018-06-07 DIAGNOSIS — I48.91 ATRIAL FIBRILLATION, UNSPECIFIED TYPE (H): ICD-10-CM

## 2018-06-07 DIAGNOSIS — I10 HYPERTENSION, UNSPECIFIED TYPE: ICD-10-CM

## 2018-06-07 PROCEDURE — 99305 1ST NF CARE MODERATE MDM 35: CPT | Performed by: INTERNAL MEDICINE

## 2018-06-08 ENCOUNTER — NURSING HOME VISIT (OUTPATIENT)
Dept: GERIATRICS | Facility: CLINIC | Age: 83
End: 2018-06-08
Payer: COMMERCIAL

## 2018-06-08 VITALS
WEIGHT: 151.1 LBS | RESPIRATION RATE: 18 BRPM | TEMPERATURE: 97.2 F | OXYGEN SATURATION: 97 % | BODY MASS INDEX: 21.63 KG/M2 | DIASTOLIC BLOOD PRESSURE: 76 MMHG | HEIGHT: 70 IN | SYSTOLIC BLOOD PRESSURE: 127 MMHG | HEART RATE: 82 BPM

## 2018-06-08 DIAGNOSIS — I63.9 CEREBROVASCULAR ACCIDENT (CVA), UNSPECIFIED MECHANISM (H): ICD-10-CM

## 2018-06-08 DIAGNOSIS — I48.91 ATRIAL FIBRILLATION, UNSPECIFIED TYPE (H): ICD-10-CM

## 2018-06-08 DIAGNOSIS — I10 HYPERTENSION, UNSPECIFIED TYPE: Primary | ICD-10-CM

## 2018-06-08 DIAGNOSIS — K59.00 CONSTIPATION, UNSPECIFIED CONSTIPATION TYPE: ICD-10-CM

## 2018-06-08 PROCEDURE — 99309 SBSQ NF CARE MODERATE MDM 30: CPT | Performed by: NURSE PRACTITIONER

## 2018-06-08 NOTE — PROGRESS NOTES
[facility]    Chief Complaint   Patient presents with     correction Acute       [place] Medical Record Number:  [id number]    HPI:    Angelito Castellanos is a 89 year old  (12/23/1925), who is being seen today for an episodic care visit at [facility].  HPI information obtained from: facility chart records, facility staff, patient report and [facility] chart review.Today's concern is:    Patient Angelito Castellanos is a 89 yr old male admitted to [facility] s/p [facility] 5/3-5/22/18 for acute ischemic stroke with history chronic Afib, pacemaker, hypertension & urinary retention     Hypertension, unspecified type  Atrial fibrillation, unspecified type (H)  Cerebrovascular accident (CVA), unspecified mechanism (H)  Constipation, unspecified constipation type    Vitals & labs stable, hr managed, remains on Metoprolol  Remains on Eliquis with no signs and symptoms bleeding  History of recent stroke, still has left sided weakness  Had bowel movement yesterday after supp    ALLERGIES: Review of patient's allergies indicates no known allergies.  Past Medical, Surgical, Family and Social History reviewed and updated in [facility].    Current Outpatient Prescriptions   Medication Sig Dispense Refill     acetaminophen (TYLENOL) 325 MG tablet Take 2 tablets (650 mg) by mouth every 6 hours as needed for mild pain 100 tablet      apixaban ANTICOAGULANT (ELIQUIS) 5 MG tablet Take 1 tablet (5 mg) by mouth 2 times daily       atorvastatin (LIPITOR) 20 MG tablet Take 1 tablet (20 mg) by mouth daily 90 tablet 1     bisacodyl (DULCOLAX) 10 MG Suppository Place 1 suppository (10 mg) rectally daily as needed for constipation 30 suppository      finasteride (PROSCAR) 5 MG tablet Take 1 tablet (5 mg) by mouth daily 30 tablet      lidocaine (XYLOCAINE) 2 % topical gel Use with straight cath       metoprolol tartrate (LOPRESSOR) 25 MG tablet Take 0.5 tablets (12.5 mg) by mouth 2 times daily 60 tablet   "    senna-docusate (SENOKOT-S;PERICOLACE) 8.6-50 MG per tablet Take 3 tablets by mouth daily And 2 tabs bid prn       tamsulosin (FLOMAX) 0.4 MG capsule Take 0.8 mg by mouth every evening       Medications reviewed:  Medications reconciled to facility chart and changes were made to reflect current medications as identified as above med list. Below are the changes that were made:   Medications stopped since last EPIC medication reconciliation:   There are no discontinued medications.    Medications started since last [facility] medication reconciliation:  Orders Placed This Encounter   Medications     lidocaine (XYLOCAINE) 2 % topical gel     Sig: Use with straight cath       REVIEW OF SYSTEMS:  10 point ROS of systems including Constitutional, Eyes, Respiratory, Cardiovascular, Gastroenterology, Genitourinary, Integumentary, Muscularskeletal, Psychiatric were all negative except for pertinent positives noted in my HPI.    Physical Exam:  /76  Pulse 82  Temp 97.2  F (36.2  C)  Resp 18  Ht 5' 10\" (1.778 m)  Wt 151 lb 1.6 oz (68.5 kg)  SpO2 97%  BMI 21.68 kg/m2  GENERAL APPEARANCE:  Alert, in no distress  ENT:  Mouth and posterior oropharynx normal, moist mucous membranes  EYES:  EOM, conjunctivae, lids, pupils and irises normal  NECK:  No adenopathy,masses or thyromegaly  RESP:  respiratory effort and palpation of chest normal, lungs clear to auscultation , no respiratory distress  CV:  Palpation and auscultation of heart done , regular rate and rhythm, no murmur, rub, or gallop  ABDOMEN:  normal bowel sounds, soft, nontender, no hepatosplenomegaly or other masses  M/S:   Gait and station normal  SKIN:  Inspection of skin and subcutaneous tissue baseline  NEURO:   Cranial nerves 2-12 are normal tested and grossly at patient's baseline  PSYCH:  oriented X 3    BP Readings from Last 3 Encounters:   06/08/18 127/76   06/01/18 129/68   05/30/18 107/62     Pulse Readings from Last 4 Encounters:   06/08/18 82 "   06/01/18 81   05/30/18 64   05/23/18 78     Wt Readings from Last 5 Encounters:   06/08/18 151 lb 1.6 oz (68.5 kg)   06/01/18 153 lb (69.4 kg)   05/30/18 153 lb (69.4 kg)   05/23/18 152 lb 8 oz (69.2 kg)   05/18/18 145 lb 9.6 oz (66 kg)       Recent Labs:   CBC RESULTS:   Recent Labs   Lab Test  06/04/18   0655  05/25/18   0607   WBC  6.0  6.0   RBC  4.22*  4.24*   HGB  13.0*  13.1*   HCT  38.5*  38.3*   MCV  91  90   MCH  30.8  30.9   MCHC  33.8  34.2   RDW  14.0  13.8   PLT  146*  166       Last Basic Metabolic Panel:  Recent Labs   Lab Test  06/04/18   0655  05/25/18   0607   NA  144  143   POTASSIUM  3.8  3.9   CHLORIDE  110*  108   EDMUNDO  8.3*  8.5   CO2  29  29   BUN  23  16   CR  0.93  1.00   GLC  120*  116*       TSH   Date Value Ref Range Status   04/29/2018 2.33 0.40 - 4.00 mU/L Final       Lab Results   Component Value Date    A1C 7.0 04/29/2018         Assessment/Plan:     Hypertension, unspecified type  Atrial fibrillation, unspecified type (H)  Cerebrovascular accident (CVA), unspecified mechanism (H)  Constipation, unspecified constipation type    blood pressure managed, hr managed  Continue on Metoprolol for SBP & hr managed  Continue on Eliquis  Continue therapies, progressing. [facility]    Increase senna s 3 tabs daily for bowel movement management, monitor         Electronically signed by  GREGORY Flanagan CNP

## 2018-06-08 NOTE — LETTER
6/8/2018        RE: Angelito Castellanos  [id number]  [facility] [id number]        [facility]    Chief Complaint   Patient presents with     senior care Acute       [place] Medical Record Number:  [id number]    HPI:    Angelito Castellanos is a 89 year old  (12/23/1925), who is being seen today for an episodic care visit at [facility].  HPI information obtained from: facility chart records, facility staff, patient report and [facility] chart review.Today's concern is:    Patient Angelito Castellanos is a 89 yr old male admitted to [facility] s/p hospitalization [facility] 5/3-5/22/18 for acute ischemic stroke with history chronic Afib, pacemaker, hypertension & urinary retention     Hypertension, unspecified type  Atrial fibrillation, unspecified type (H)  Cerebrovascular accident (CVA), unspecified mechanism (H)  Constipation, unspecified constipation type    Vitals & labs stable, hr managed, remains on Metoprolol  Remains on Eliquis with no signs and symptoms bleeding  History of recent stroke, still has left sided weakness  Had bowel movement yesterday after supp    ALLERGIES: Review of patient's allergies indicates no known allergies.  Past Medical, Surgical, Family and Social History reviewed and updated in [facility].    Current Outpatient Prescriptions   Medication Sig Dispense Refill     acetaminophen (TYLENOL) 325 MG tablet Take 2 tablets (650 mg) by mouth every 6 hours as needed for mild pain 100 tablet      apixaban ANTICOAGULANT (ELIQUIS) 5 MG tablet Take 1 tablet (5 mg) by mouth 2 times daily       atorvastatin (LIPITOR) 20 MG tablet Take 1 tablet (20 mg) by mouth daily 90 tablet 1     bisacodyl (DULCOLAX) 10 MG Suppository Place 1 suppository (10 mg) rectally daily as needed for constipation 30 suppository      finasteride (PROSCAR) 5 MG tablet Take 1 tablet (5 mg) by mouth daily 30 tablet      lidocaine (XYLOCAINE) 2 % topical gel Use with straight cath       metoprolol tartrate  "(LOPRESSOR) 25 MG tablet Take 0.5 tablets (12.5 mg) by mouth 2 times daily 60 tablet      senna-docusate (SENOKOT-S;PERICOLACE) 8.6-50 MG per tablet Take 3 tablets by mouth daily And 2 tabs bid prn       tamsulosin (FLOMAX) 0.4 MG capsule Take 0.8 mg by mouth every evening       Medications reviewed:  Medications reconciled to facility chart and changes were made to reflect current medications as identified as above med list. Below are the changes that were made:   Medications stopped since last EPIC medication reconciliation:   There are no discontinued medications.    Medications started since last [facility] medication reconciliation:  Orders Placed This Encounter   Medications     lidocaine (XYLOCAINE) 2 % topical gel     Sig: Use with straight cath       REVIEW OF SYSTEMS:  10 point ROS of systems including Constitutional, Eyes, Respiratory, Cardiovascular, Gastroenterology, Genitourinary, Integumentary, Muscularskeletal, Psychiatric were all negative except for pertinent positives noted in my HPI.    Physical Exam:  /76  Pulse 82  Temp 97.2  F (36.2  C)  Resp 18  Ht 5' 10\" (1.778 m)  Wt 151 lb 1.6 oz (68.5 kg)  SpO2 97%  BMI 21.68 kg/m2  GENERAL APPEARANCE:  Alert, in no distress  ENT:  Mouth and posterior oropharynx normal, moist mucous membranes  EYES:  EOM, conjunctivae, lids, pupils and irises normal  NECK:  No adenopathy,masses or thyromegaly  RESP:  respiratory effort and palpation of chest normal, lungs clear to auscultation , no respiratory distress  CV:  Palpation and auscultation of heart done , regular rate and rhythm, no murmur, rub, or gallop  ABDOMEN:  normal bowel sounds, soft, nontender, no hepatosplenomegaly or other masses  M/S:   Gait and station normal  SKIN:  Inspection of skin and subcutaneous tissue baseline  NEURO:   Cranial nerves 2-12 are normal tested and grossly at patient's baseline  PSYCH:  oriented X 3    BP Readings from Last 3 Encounters:   06/08/18 127/76   06/01/18 " 129/68   05/30/18 107/62     Pulse Readings from Last 4 Encounters:   06/08/18 82   06/01/18 81   05/30/18 64   05/23/18 78     Wt Readings from Last 5 Encounters:   06/08/18 151 lb 1.6 oz (68.5 kg)   06/01/18 153 lb (69.4 kg)   05/30/18 153 lb (69.4 kg)   05/23/18 152 lb 8 oz (69.2 kg)   05/18/18 145 lb 9.6 oz (66 kg)       Recent Labs:   CBC RESULTS:   Recent Labs   Lab Test  06/04/18   0655  05/25/18   0607   WBC  6.0  6.0   RBC  4.22*  4.24*   HGB  13.0*  13.1*   HCT  38.5*  38.3*   MCV  91  90   MCH  30.8  30.9   MCHC  33.8  34.2   RDW  14.0  13.8   PLT  146*  166       Last Basic Metabolic Panel:  Recent Labs   Lab Test  06/04/18   0655  05/25/18   0607   NA  144  143   POTASSIUM  3.8  3.9   CHLORIDE  110*  108   EDMUNDO  8.3*  8.5   CO2  29  29   BUN  23  16   CR  0.93  1.00   GLC  120*  116*       TSH   Date Value Ref Range Status   04/29/2018 2.33 0.40 - 4.00 mU/L Final       Lab Results   Component Value Date    A1C 7.0 04/29/2018         Assessment/Plan:     Hypertension, unspecified type  Atrial fibrillation, unspecified type (H)  Cerebrovascular accident (CVA), unspecified mechanism (H)  Constipation, unspecified constipation type    blood pressure managed, hr managed  Continue on Metoprolol for SBP & hr managed  Continue on Eliquis  Continue therapies, progressing. [facility]    Increase senna s 3 tabs daily for bowel movement management, monitor         Electronically signed by  GREGORY Flanagan CNP                      Sincerely,        GREGORY Flanagan CNP

## 2018-06-09 NOTE — PROGRESS NOTES
[facility]  PRIMARY CARE PROVIDER AND CLINIC:  Gerardo Adhikari [facility] [address] [facility] [address] / [facility] [address]      Pt was seen by Dr Maher on 6/7/18 for an initial TCU visit          HPI:    Angelito Castellanos is a 89 year old  (12/23/1925), history of chronic afib, history of ischemic infarcts in R and L MCA distributions 4/18,  who was admitted to the [facility] from [facility]      Hospital courses reviewed by me is as per the hospital d/c summary and NP note.    Pt has been noted to have mild-mod deficits in short term memory with need for 24 hour supervision, as well as dysphagia (now on DD 2 with thin liquids)  He is walking with walker and assistance      Pt reports feeling well, notes mild L LE weakness, denies difficulty swallowing, chest pain, SOB or dizziness      CODE STATUS/ADVANCE DIRECTIVES DISCUSSION:   DNR / DNI  Patient's living condition: lives with adult children    ALLERGIES:Review of patient's allergies indicates no known allergies.  PAST MEDICAL HISTORY:  has a past medical history of Chronic atrial fibrillation (H); [place] (hard of hearing); and Prediabetes.  PAST SURGICAL HISTORY:  has a past surgical history that includes Cardiac surgery; appendectomy; and nasal/sinus polypectomy.  FAMILY HISTORY: family history includes CEREBROVASCULAR DISEASE in an other family member; Coronary Artery Disease in his father.  SOCIAL HISTORY:  reports that he has quit smoking. He does not have any smokeless tobacco history on file. He reports that he drinks alcohol. He reports that he does not use illicit drugs.    Post Discharge Medication Reconciliation Status:   .  Current Outpatient Prescriptions   Medication Sig Dispense Refill     acetaminophen (TYLENOL) 325 MG tablet Take 2 tablets (650 mg) by mouth every 6 hours as needed for mild pain 100 tablet      apixaban ANTICOAGULANT (ELIQUIS) 5 MG tablet Take 1 tablet (5  mg) by mouth 2 times daily       atorvastatin (LIPITOR) 20 MG tablet Take 1 tablet (20 mg) by mouth daily 90 tablet 1     bisacodyl (DULCOLAX) 10 MG Suppository Place 1 suppository (10 mg) rectally daily as needed for constipation 30 suppository      finasteride (PROSCAR) 5 MG tablet Take 1 tablet (5 mg) by mouth daily 30 tablet      lidocaine (XYLOCAINE) 2 % topical gel Use with straight cath       metoprolol tartrate (LOPRESSOR) 25 MG tablet Take 0.5 tablets (12.5 mg) by mouth 2 times daily 60 tablet      senna-docusate (SENOKOT-S;PERICOLACE) 8.6-50 MG per tablet Take 3 tablets by mouth daily And 2 tabs bid prn       tamsulosin (FLOMAX) 0.4 MG capsule Take 0.8 mg by mouth every evening         ROS:  10 point ROS neg except as noted above.    Exam:    GENERAL APPEARANCE:  Alert, in no distress, sitting in chair in room, appears well  ENT:  Mouth and posterior oropharynx normal, moist mucous membranes, [place]  EYES:  EOM, conjunctivae, lids, pupils and irises normal  NECK:  No adenopathy,masses or thyromegaly  RESP:  Lungs clear  CV generally reg, occ extrasystolic beats  ABDOMEN: soft, non-tender  M/S:   No LE edema  SKIN:  No rash  NEURO:   Facies symmetric. No gross focal weakness. Gait was not assessed, speech is clear  PSYCH:  oriented X 3, pleasant    BP Readings from Last 3 Encounters:   06/08/18 127/76   06/01/18 129/68   05/30/18 107/62     Pulse Readings from Last 4 Encounters:   06/08/18 82   06/01/18 81   05/30/18 64   05/23/18 78     Wt Readings from Last 5 Encounters:   06/08/18 151 lb 1.6 oz (68.5 kg)   06/01/18 153 lb (69.4 kg)   05/30/18 153 lb (69.4 kg)   05/23/18 152 lb 8 oz (69.2 kg)   05/18/18 145 lb 9.6 oz (66 kg)       Lab/Diagnostic data:  CBC RESULTS:   Recent Labs   Lab Test  05/22/18   1014  05/05/18   0632   WBC  5.9  6.8   RBC  4.60  4.39*   HGB  14.1  13.4   HCT  42.2  39.3*   MCV  92  90   MCH  30.7  30.5   MCHC  33.4  34.1   RDW  13.5  13.9   PLT  202  159       Last Basic Metabolic  Panel:  Recent Labs   Lab Test  05/22/18   1014  05/05/18   0632   NA  143  144   POTASSIUM  4.2  3.8   CHLORIDE  107  110*   EDMUNDO  8.9  8.5   CO2  28  24   BUN  25  23   CR  1.07  0.89   GLC  213*  107*       TSH   Date Value Ref Range Status   04/29/2018 2.33 0.40 - 4.00 mU/L Final       Lab Results   Component Value Date    A1C 7.0 04/29/2018           ASSESSMENT/PLAN:    Acute embolic stroke (H)  Atrial fibrillation, unspecified type (H), s/p PM placement  Hypertension, unspecified type  Neuro and CV status stable  Residual of mild dysphagia, cognitive deficits  Plan  PT/OT/Speech Tx. Continue apixaban, Metoprolol, statin    BPH  Continue on Flomax and Proscar  Plan monitor bladder function      Felipe Maher MD

## 2018-06-11 ENCOUNTER — NURSING HOME VISIT (OUTPATIENT)
Dept: GERIATRICS | Facility: CLINIC | Age: 83
End: 2018-06-11
Payer: COMMERCIAL

## 2018-06-11 VITALS
OXYGEN SATURATION: 96 % | TEMPERATURE: 98.3 F | WEIGHT: 151.1 LBS | DIASTOLIC BLOOD PRESSURE: 74 MMHG | HEIGHT: 70 IN | BODY MASS INDEX: 21.63 KG/M2 | HEART RATE: 81 BPM | RESPIRATION RATE: 20 BRPM | SYSTOLIC BLOOD PRESSURE: 126 MMHG

## 2018-06-11 DIAGNOSIS — Z53.9 ERRONEOUS ENCOUNTER--DISREGARD: Primary | ICD-10-CM

## 2018-06-13 ENCOUNTER — NURSING HOME VISIT (OUTPATIENT)
Dept: GERIATRICS | Facility: CLINIC | Age: 83
End: 2018-06-13
Payer: COMMERCIAL

## 2018-06-13 VITALS
HEIGHT: 70 IN | SYSTOLIC BLOOD PRESSURE: 159 MMHG | BODY MASS INDEX: 22.07 KG/M2 | RESPIRATION RATE: 18 BRPM | HEART RATE: 77 BPM | OXYGEN SATURATION: 95 % | TEMPERATURE: 98.8 F | WEIGHT: 154.2 LBS | DIASTOLIC BLOOD PRESSURE: 81 MMHG

## 2018-06-13 DIAGNOSIS — I63.9 CEREBROVASCULAR ACCIDENT (CVA), UNSPECIFIED MECHANISM (H): ICD-10-CM

## 2018-06-13 DIAGNOSIS — I10 HYPERTENSION, UNSPECIFIED TYPE: ICD-10-CM

## 2018-06-13 DIAGNOSIS — I48.91 ATRIAL FIBRILLATION, UNSPECIFIED TYPE (H): ICD-10-CM

## 2018-06-13 PROCEDURE — 99309 SBSQ NF CARE MODERATE MDM 30: CPT | Performed by: NURSE PRACTITIONER

## 2018-06-13 NOTE — PROGRESS NOTES
[facility]    Chief Complaint   Patient presents with     MCC Acute       [place] Medical Record Number:  [id number]    HPI:    Angelito Castellanos is a 89 year old  (12/23/1925), who is being seen today for an episodic care visit at [facility].  HPI information obtained from: facility chart records, facility staff, patient report and [facility] chart review.Today's concern is:    Patient Angelito Castellanos is a 89 yr old male [facility] for acute ischemic stroke with history chronic Afib, pacemaker, hypertension & urinary retention        Cerebrovascular accident (CVA), unspecified mechanism (H)  Hypertension, unspecified type  Atrial fibrillation, unspecified type (H)     Progressing in therapies, Denies pain  PT Walking 300 feet with fww; stand by assist/ contact guard assist with transfers & toileting   Patient report voiding fine & regular elimination, reports had bowel movement yesterday  Does retain urine 150-250 cc post void residual      ALLERGIES: Review of patient's allergies indicates no known allergies.  Past Medical, Surgical, Family and Social History reviewed and updated in [facility].    Current Outpatient Prescriptions   Medication Sig Dispense Refill     acetaminophen (TYLENOL) 325 MG tablet Take 2 tablets (650 mg) by mouth every 6 hours as needed for mild pain 100 tablet      apixaban ANTICOAGULANT (ELIQUIS) 5 MG tablet Take 1 tablet (5 mg) by mouth 2 times daily       atorvastatin (LIPITOR) 20 MG tablet Take 1 tablet (20 mg) by mouth daily 90 tablet 1     bisacodyl (DULCOLAX) 10 MG Suppository Place 1 suppository (10 mg) rectally daily as needed for constipation 30 suppository      finasteride (PROSCAR) 5 MG tablet Take 1 tablet (5 mg) by mouth daily 30 tablet      lidocaine (XYLOCAINE) 2 % topical gel Use with straight cath       metoprolol tartrate (LOPRESSOR) 25 MG tablet Take 0.5 tablets (12.5 mg) by mouth 2  "times daily 60 tablet      senna-docusate (SENOKOT-S;PERICOLACE) 8.6-50 MG per tablet Take 3 tablets by mouth daily And 2 tabs bid prn       tamsulosin (FLOMAX) 0.4 MG capsule Take 0.8 mg by mouth every evening       Medications reviewed:  Medications reconciled to facility chart and changes were made to reflect current medications as identified as above med list. Below are the changes that were made:   Medications stopped since last EPIC medication reconciliation:   There are no discontinued medications.    Medications started since last [facility] medication reconciliation:  No orders of the defined types were placed in this encounter.      REVIEW OF SYSTEMS:  10 point ROS of systems including Constitutional, Eyes, Respiratory, Cardiovascular, Gastroenterology, Genitourinary, Integumentary, Muscularskeletal, Psychiatric were all negative except for pertinent positives noted in my HPI.    Physical Exam:  /81  Pulse 77  Temp 98.8  F (37.1  C)  Resp 18  Ht 5' 10\" (1.778 m)  Wt 154 lb 3.2 oz (69.9 kg)  SpO2 95%  BMI 22.13 kg/m2  GENERAL APPEARANCE:  Alert, in no distress  ENT:  Mouth and posterior oropharynx normal, moist mucous membranes  EYES:  EOM, conjunctivae, lids, pupils and irises normal  NECK:  No adenopathy,masses or thyromegaly  RESP:  respiratory effort and palpation of chest normal, lungs clear to auscultation , no respiratory distress  CV:  Palpation and auscultation of heart done , regular rate and rhythm, no murmur, rub, or gallop  ABDOMEN:  normal bowel sounds, soft, nontender, no hepatosplenomegaly or other masses  M/S:   Gait and station normal  SKIN:  Inspection of skin and subcutaneous tissue baseline  NEURO:   Cranial nerves 2-12 are normal tested and grossly at patient's baseline  PSYCH:  oriented X 3    BP Readings from Last 3 Encounters:   06/13/18 159/81   06/11/18 126/74   06/08/18 127/76     Pulse Readings from Last 4 Encounters:   06/13/18 77   06/11/18 81   06/08/18 82   06/01/18 " 81     Wt Readings from Last 5 Encounters:   06/13/18 154 lb 3.2 oz (69.9 kg)   06/11/18 151 lb 1.6 oz (68.5 kg)   06/08/18 151 lb 1.6 oz (68.5 kg)   06/01/18 153 lb (69.4 kg)   05/30/18 153 lb (69.4 kg)       Recent Labs:   CBC RESULTS:   Recent Labs   Lab Test  06/04/18   0655  05/25/18   0607   WBC  6.0  6.0   RBC  4.22*  4.24*   HGB  13.0*  13.1*   HCT  38.5*  38.3*   MCV  91  90   MCH  30.8  30.9   MCHC  33.8  34.2   RDW  14.0  13.8   PLT  146*  166       Last Basic Metabolic Panel:  Recent Labs   Lab Test  06/04/18   0655  05/25/18   0607   NA  144  143   POTASSIUM  3.8  3.9   CHLORIDE  110*  108   EDMUNDO  8.3*  8.5   CO2  29  29   BUN  23  16   CR  0.93  1.00   GLC  120*  116*       TSH   Date Value Ref Range Status   04/29/2018 2.33 0.40 - 4.00 mU/L Final       Lab Results   Component Value Date    A1C 7.0 04/29/2018         Assessment/Plan:     Cerebrovascular accident (CVA), unspecified mechanism (H)  Hypertension, unspecified type  Atrial fibrillation, unspecified type (H)    Hr & vitals stable, continue on eliquis & Metoprolol  Progressing in therapies, walking 300ft, stand by assist/CGA transfers  [facility]  Regular Bm,  retains urine ~150-250cc, has order to cath if symptomatic or >300cc  follow up urologist; Continue on proscar & flomax higher dose    Electronically signed by  GREGORY Flanagan CNP

## 2018-06-13 NOTE — LETTER
6/13/2018        RE: Angelito Castellanos  [address]  [facility] [address]        [place] [facility]    Chief Complaint   Patient presents with     senior living Acute       [place] Medical Record Number:  [id number]    HPI:    Angelito Castellanos is a 89 year old  (12/23/1925), who is being seen today for an episodic care visit at [facility].  HPI information obtained from: facility chart records, facility staff, patient report and [facility] chart review.Today's concern is:    Patient Angelito Castellanos is a 89 yr old male admitted to [facility] s/p hospitalization [facility] 5/3-5/22/18 for acute ischemic stroke with history chronic Afib, pacemaker, hypertension & urinary retention        Cerebrovascular accident (CVA), unspecified mechanism (H)  Hypertension, unspecified type  Atrial fibrillation, unspecified type (H)     Progressing in therapies, Denies pain  PT Walking 300 feet with fww; stand by assist/ contact guard assist with transfers & toileting   Patient report voiding fine & regular elimination, reports had bowel movement yesterday  Does retain urine 150-250 cc post void residual      ALLERGIES: Review of patient's allergies indicates no known allergies.  Past Medical, Surgical, Family and Social History reviewed and updated in [facility].    Current Outpatient Prescriptions   Medication Sig Dispense Refill     acetaminophen (TYLENOL) 325 MG tablet Take 2 tablets (650 mg) by mouth every 6 hours as needed for mild pain 100 tablet      apixaban ANTICOAGULANT (ELIQUIS) 5 MG tablet Take 1 tablet (5 mg) by mouth 2 times daily       atorvastatin (LIPITOR) 20 MG tablet Take 1 tablet (20 mg) by mouth daily 90 tablet 1     bisacodyl (DULCOLAX) 10 MG Suppository Place 1 suppository (10 mg) rectally daily as needed for constipation 30 suppository      finasteride (PROSCAR) 5 MG tablet Take 1 tablet (5 mg) by mouth daily 30 tablet      lidocaine (XYLOCAINE) 2 % topical gel Use with straight cath        "metoprolol tartrate (LOPRESSOR) 25 MG tablet Take 0.5 tablets (12.5 mg) by mouth 2 times daily 60 tablet      senna-docusate (SENOKOT-S;PERICOLACE) 8.6-50 MG per tablet Take 3 tablets by mouth daily And 2 tabs bid prn       tamsulosin (FLOMAX) 0.4 MG capsule Take 0.8 mg by mouth every evening       Medications reviewed:  Medications reconciled to facility chart and changes were made to reflect current medications as identified as above med list. Below are the changes that were made:   Medications stopped since last EPIC medication reconciliation:   There are no discontinued medications.    Medications started since last [facility] medication reconciliation:  No orders of the defined types were placed in this encounter.      REVIEW OF SYSTEMS:  10 point ROS of systems including Constitutional, Eyes, Respiratory, Cardiovascular, Gastroenterology, Genitourinary, Integumentary, Muscularskeletal, Psychiatric were all negative except for pertinent positives noted in my HPI.    Physical Exam:  /81  Pulse 77  Temp 98.8  F (37.1  C)  Resp 18  Ht 5' 10\" (1.778 m)  Wt 154 lb 3.2 oz (69.9 kg)  SpO2 95%  BMI 22.13 kg/m2  GENERAL APPEARANCE:  Alert, in no distress  ENT:  Mouth and posterior oropharynx normal, moist mucous membranes  EYES:  EOM, conjunctivae, lids, pupils and irises normal  NECK:  No adenopathy,masses or thyromegaly  RESP:  respiratory effort and palpation of chest normal, lungs clear to auscultation , no respiratory distress  CV:  Palpation and auscultation of heart done , regular rate and rhythm, no murmur, rub, or gallop  ABDOMEN:  normal bowel sounds, soft, nontender, no hepatosplenomegaly or other masses  M/S:   Gait and station normal  SKIN:  Inspection of skin and subcutaneous tissue baseline  NEURO:   Cranial nerves 2-12 are normal tested and grossly at patient's baseline  PSYCH:  oriented X 3    BP Readings from Last 3 Encounters:   06/13/18 159/81   06/11/18 126/74   06/08/18 127/76     Pulse " Readings from Last 4 Encounters:   06/13/18 77   06/11/18 81   06/08/18 82   06/01/18 81     Wt Readings from Last 5 Encounters:   06/13/18 154 lb 3.2 oz (69.9 kg)   06/11/18 151 lb 1.6 oz (68.5 kg)   06/08/18 151 lb 1.6 oz (68.5 kg)   06/01/18 153 lb (69.4 kg)   05/30/18 153 lb (69.4 kg)       Recent Labs:   CBC RESULTS:   Recent Labs   Lab Test  06/04/18   0655  05/25/18   0607   WBC  6.0  6.0   RBC  4.22*  4.24*   HGB  13.0*  13.1*   HCT  38.5*  38.3*   MCV  91  90   MCH  30.8  30.9   MCHC  33.8  34.2   RDW  14.0  13.8   PLT  146*  166       Last Basic Metabolic Panel:  Recent Labs   Lab Test  06/04/18   0655  05/25/18   0607   NA  144  143   POTASSIUM  3.8  3.9   CHLORIDE  110*  108   EDMUNDO  8.3*  8.5   CO2  29  29   BUN  23  16   CR  0.93  1.00   GLC  120*  116*       TSH   Date Value Ref Range Status   04/29/2018 2.33 0.40 - 4.00 mU/L Final       Lab Results   Component Value Date    A1C 7.0 04/29/2018         Assessment/Plan:     Cerebrovascular accident (CVA), unspecified mechanism (H)  Hypertension, unspecified type  Atrial fibrillation, unspecified type (H)    Hr & vitals stable, continue on eliquis & Metoprolol  Progressing in therapies, walking 300ft, stand by assist/CGA transfers  [facility]  Regular Bm,  retains urine ~150-250cc, has order to cath if symptomatic or >300cc  follow up urologist; Continue on proscar & flomax higher dose    Electronically signed by  GREGORY Flanagan CNP                      Sincerely,        GREGORY Flanagan CNP

## 2018-06-20 ENCOUNTER — NURSING HOME VISIT (OUTPATIENT)
Dept: GERIATRICS | Facility: CLINIC | Age: 83
End: 2018-06-20
Payer: COMMERCIAL

## 2018-06-20 VITALS
TEMPERATURE: 97.2 F | RESPIRATION RATE: 16 BRPM | HEIGHT: 70 IN | DIASTOLIC BLOOD PRESSURE: 84 MMHG | SYSTOLIC BLOOD PRESSURE: 138 MMHG | BODY MASS INDEX: 21.86 KG/M2 | OXYGEN SATURATION: 98 % | HEART RATE: 77 BPM | WEIGHT: 152.7 LBS

## 2018-06-20 DIAGNOSIS — I10 HYPERTENSION, UNSPECIFIED TYPE: ICD-10-CM

## 2018-06-20 DIAGNOSIS — I63.9 CEREBROVASCULAR ACCIDENT (CVA), UNSPECIFIED MECHANISM (H): ICD-10-CM

## 2018-06-20 DIAGNOSIS — I48.91 ATRIAL FIBRILLATION, UNSPECIFIED TYPE (H): ICD-10-CM

## 2018-06-20 PROCEDURE — 99309 SBSQ NF CARE MODERATE MDM 30: CPT | Performed by: NURSE PRACTITIONER

## 2018-06-20 NOTE — PROGRESS NOTES
Discontinue lidocaine patch, tylenol & bisacodyl supp not use    [place] [facility]    Chief Complaint   Patient presents with     longterm Acute       [place] Medical Record Number:  [id number]    HPI:    Angelito Castellanos is a 89 year old  (12/23/1925), who is being seen today for an episodic care visit at [facility].  HPI information obtained from: facility chart records, facility staff, patient report and [facility] chart review.Today's concern is:    Patient Angelito Castellanos is a 89 yr old male admitted to [facility] for acute ischemic stroke with history chronic Afib, pacemaker, hypertension & urinary retention     Cerebrovascular accident (CVA), unspecified mechanism (H)  Hypertension, unspecified type  Atrial fibrillation, unspecified type (H)     Patient progressing in therapies, walking 300ft, 12 steps, stand by assist transfers  Patient reports feels fine with no acute issues  & content with plan to move to [facility] in near future    ALLERGIES: Review of patient's allergies indicates no known allergies.  Past Medical, Surgical, Family and Social History reviewed and updated in [facility].    Current Outpatient Prescriptions   Medication Sig Dispense Refill     apixaban ANTICOAGULANT (ELIQUIS) 5 MG tablet Take 1 tablet (5 mg) by mouth 2 times daily       atorvastatin (LIPITOR) 20 MG tablet Take 1 tablet (20 mg) by mouth daily 90 tablet 1     finasteride (PROSCAR) 5 MG tablet Take 1 tablet (5 mg) by mouth daily 30 tablet      metoprolol tartrate (LOPRESSOR) 25 MG tablet Take 0.5 tablets (12.5 mg) by mouth 2 times daily 60 tablet      senna-docusate (SENOKOT-S;PERICOLACE) 8.6-50 MG per tablet Take 3 tablets by mouth daily And 2 tabs bid prn       tamsulosin (FLOMAX) 0.4 MG capsule Take 0.8 mg by mouth every evening       Medications reviewed:  Medications reconciled to facility chart and changes were made to reflect current  "medications as identified as above med list. Below are the changes that were made:   Medications stopped since last EPIC medication reconciliation:   Medications Discontinued During This Encounter   Medication Reason     lidocaine (XYLOCAINE) 2 % topical gel      acetaminophen (TYLENOL) 325 MG tablet      bisacodyl (DULCOLAX) 10 MG Suppository        Medications started since last [facility] medication reconciliation:  No orders of the defined types were placed in this encounter.      REVIEW OF SYSTEMS:  10 point ROS of systems including Constitutional, Eyes, Respiratory, Cardiovascular, Gastroenterology, Genitourinary, Integumentary, Muscularskeletal, Psychiatric were all negative except for pertinent positives noted in my HPI.    Physical Exam:  /84  Pulse 77  Temp 97.2  F (36.2  C)  Resp 16  Ht 5' 10\" (1.778 m)  Wt 152 lb 11.2 oz (69.3 kg)  SpO2 98%  BMI 21.91 kg/m2  GENERAL APPEARANCE:  Alert, in no distress  ENT:  Mouth and posterior oropharynx normal, moist mucous membranes  EYES:  EOM, conjunctivae, lids, pupils and irises normal  NECK:  No adenopathy,masses or thyromegaly  RESP:  respiratory effort and palpation of chest normal, lungs clear to auscultation , no respiratory distress  CV:  Palpation and auscultation of heart done , regular rate and rhythm, no murmur, rub, or gallop  ABDOMEN:  normal bowel sounds, soft, nontender, no hepatosplenomegaly or other masses  M/S:   Gait and station normal  SKIN:  Inspection of skin and subcutaneous tissue baseline  NEURO:   Cranial nerves 2-12 are normal tested and grossly at patient's baseline  PSYCH:  oriented X 3    BP Readings from Last 3 Encounters:   06/20/18 138/84   06/13/18 159/81   06/11/18 126/74     Pulse Readings from Last 4 Encounters:   06/20/18 77   06/13/18 77   06/11/18 81   06/08/18 82     Wt Readings from Last 5 Encounters:   06/20/18 152 lb 11.2 oz (69.3 kg)   06/13/18 154 lb 3.2 oz (69.9 kg)   06/11/18 151 lb 1.6 oz (68.5 kg)   06/08/18 " 151 lb 1.6 oz (68.5 kg)   06/01/18 153 lb (69.4 kg)         Recent Labs:   CBC RESULTS:   Recent Labs   Lab Test  06/04/18   0655  05/25/18   0607   WBC  6.0  6.0   RBC  4.22*  4.24*   HGB  13.0*  13.1*   HCT  38.5*  38.3*   MCV  91  90   MCH  30.8  30.9   MCHC  33.8  34.2   RDW  14.0  13.8   PLT  146*  166       Last Basic Metabolic Panel:  Recent Labs   Lab Test  06/04/18   0655  05/25/18   0607   NA  144  143   POTASSIUM  3.8  3.9   CHLORIDE  110*  108   EDMUNDO  8.3*  8.5   CO2  29  29   BUN  23  16   CR  0.93  1.00   GLC  120*  116*       TSH   Date Value Ref Range Status   04/29/2018 2.33 0.40 - 4.00 mU/L Final       Lab Results   Component Value Date    A1C 7.0 04/29/2018           Assessment/Plan:     Cerebrovascular accident (CVA), unspecified mechanism (H)  Hypertension, unspecified type  Atrial fibrillation, unspecified type (H)    Patient progressing in therapies, walking 300ft, 12 steps, stand by assist transfers  Improved function & strength left side, s/p stroke  [facility]     Vitals stable room air, blood pressure managed  On reg texture diet, eating meals  Report regular elimination, does retain urine <250cc at times & asymptomatic, on Flomax & Proscar, plan follow up urologist       Electronically signed by  GREGORY Flanagan CNP

## 2018-06-20 NOTE — LETTER
6/20/2018        RE: Angelito Castellanos  [address]  [facility] [address]        Discontinue lidocaine patch, tylenol & bisacodyl supp not use    [place] [facility]    Chief Complaint   Patient presents with     alf Acute       [place] Medical Record Number:  [id number]    HPI:    Angelito Castellanos is a 89 year old  (12/23/1925), who is being seen today for an episodic care visit at [facility].  HPI information obtained from: facility chart records, facility staff, patient report and [facility] chart review.Today's concern is:    Patient Angelito Castellanos is a 89 yr old male admitted to [facility] s/p hospitalization [facility] 5/3-5/22/18 for acute ischemic stroke with history chronic Afib, pacemaker, hypertension & urinary retention     Cerebrovascular accident (CVA), unspecified mechanism (H)  Hypertension, unspecified type  Atrial fibrillation, unspecified type (H)     Patient progressing in therapies, walking 300ft, 12 steps, stand by assist transfers  Patient reports feels fine with no acute issues  & content with plan to move to [facility] in near future    ALLERGIES: Review of patient's allergies indicates no known allergies.  Past Medical, Surgical, Family and Social History reviewed and updated in [facility].    Current Outpatient Prescriptions   Medication Sig Dispense Refill     apixaban ANTICOAGULANT (ELIQUIS) 5 MG tablet Take 1 tablet (5 mg) by mouth 2 times daily       atorvastatin (LIPITOR) 20 MG tablet Take 1 tablet (20 mg) by mouth daily 90 tablet 1     finasteride (PROSCAR) 5 MG tablet Take 1 tablet (5 mg) by mouth daily 30 tablet      metoprolol tartrate (LOPRESSOR) 25 MG tablet Take 0.5 tablets (12.5 mg) by mouth 2 times daily 60 tablet      senna-docusate (SENOKOT-S;PERICOLACE) 8.6-50 MG per tablet Take 3 tablets by mouth daily And 2 tabs bid prn       tamsulosin (FLOMAX) 0.4 MG capsule Take 0.8 mg by mouth every evening       Medications  "reviewed:  Medications reconciled to facility chart and changes were made to reflect current medications as identified as above med list. Below are the changes that were made:   Medications stopped since last EPIC medication reconciliation:   Medications Discontinued During This Encounter   Medication Reason     lidocaine (XYLOCAINE) 2 % topical gel      acetaminophen (TYLENOL) 325 MG tablet      bisacodyl (DULCOLAX) 10 MG Suppository        Medications started since last [facility] medication reconciliation:  No orders of the defined types were placed in this encounter.      REVIEW OF SYSTEMS:  10 point ROS of systems including Constitutional, Eyes, Respiratory, Cardiovascular, Gastroenterology, Genitourinary, Integumentary, Muscularskeletal, Psychiatric were all negative except for pertinent positives noted in my HPI.    Physical Exam:  /84  Pulse 77  Temp 97.2  F (36.2  C)  Resp 16  Ht 5' 10\" (1.778 m)  Wt 152 lb 11.2 oz (69.3 kg)  SpO2 98%  BMI 21.91 kg/m2  GENERAL APPEARANCE:  Alert, in no distress  ENT:  Mouth and posterior oropharynx normal, moist mucous membranes  EYES:  EOM, conjunctivae, lids, pupils and irises normal  NECK:  No adenopathy,masses or thyromegaly  RESP:  respiratory effort and palpation of chest normal, lungs clear to auscultation , no respiratory distress  CV:  Palpation and auscultation of heart done , regular rate and rhythm, no murmur, rub, or gallop  ABDOMEN:  normal bowel sounds, soft, nontender, no hepatosplenomegaly or other masses  M/S:   Gait and station normal  SKIN:  Inspection of skin and subcutaneous tissue baseline  NEURO:   Cranial nerves 2-12 are normal tested and grossly at patient's baseline  PSYCH:  oriented X 3    BP Readings from Last 3 Encounters:   06/20/18 138/84   06/13/18 159/81   06/11/18 126/74     Pulse Readings from Last 4 Encounters:   06/20/18 77   06/13/18 77   06/11/18 81   06/08/18 82     Wt Readings from Last 5 Encounters:   06/20/18 152 lb 11.2 oz " (69.3 kg)   06/13/18 154 lb 3.2 oz (69.9 kg)   06/11/18 151 lb 1.6 oz (68.5 kg)   06/08/18 151 lb 1.6 oz (68.5 kg)   06/01/18 153 lb (69.4 kg)         Recent Labs:   CBC RESULTS:   Recent Labs   Lab Test  06/04/18   0655  05/25/18   0607   WBC  6.0  6.0   RBC  4.22*  4.24*   HGB  13.0*  13.1*   HCT  38.5*  38.3*   MCV  91  90   MCH  30.8  30.9   MCHC  33.8  34.2   RDW  14.0  13.8   PLT  146*  166       Last Basic Metabolic Panel:  Recent Labs   Lab Test  06/04/18   0655  05/25/18   0607   NA  144  143   POTASSIUM  3.8  3.9   CHLORIDE  110*  108   EDMUNDO  8.3*  8.5   CO2  29  29   BUN  23  16   CR  0.93  1.00   GLC  120*  116*       TSH   Date Value Ref Range Status   04/29/2018 2.33 0.40 - 4.00 mU/L Final       Lab Results   Component Value Date    A1C 7.0 04/29/2018           Assessment/Plan:     Cerebrovascular accident (CVA), unspecified mechanism (H)  Hypertension, unspecified type  Atrial fibrillation, unspecified type (H)    Patient progressing in therapies, walking 300ft, 12 steps, stand by assist transfers  Improved function & strength left side, s/p stroke  [facility]     Vitals stable room air, blood pressure managed  On reg texture diet, eating meals  Report regular elimination, does retain urine <250cc at times & asymptomatic, on Flomax & Proscar, plan follow up urologist       Electronically signed by  GREGORY Flanagan CNP                      Sincerely,        GREGORY Flanagan CNP

## 2018-06-25 ENCOUNTER — NURSING HOME VISIT (OUTPATIENT)
Dept: GERIATRICS | Facility: CLINIC | Age: 83
End: 2018-06-25
Payer: COMMERCIAL

## 2018-06-25 VITALS
HEIGHT: 69 IN | DIASTOLIC BLOOD PRESSURE: 82 MMHG | BODY MASS INDEX: 22.62 KG/M2 | TEMPERATURE: 97.8 F | SYSTOLIC BLOOD PRESSURE: 150 MMHG | HEART RATE: 91 BPM | WEIGHT: 152.7 LBS | OXYGEN SATURATION: 97 % | RESPIRATION RATE: 16 BRPM

## 2018-06-25 DIAGNOSIS — I48.91 ATRIAL FIBRILLATION, UNSPECIFIED TYPE (H): Primary | ICD-10-CM

## 2018-06-25 DIAGNOSIS — K59.09 OTHER CONSTIPATION: ICD-10-CM

## 2018-06-25 DIAGNOSIS — M62.81 GENERALIZED MUSCLE WEAKNESS: ICD-10-CM

## 2018-06-25 PROCEDURE — 99309 SBSQ NF CARE MODERATE MDM 30: CPT | Performed by: NURSE PRACTITIONER

## 2018-06-25 NOTE — LETTER
6/25/2018        RE: Angelito Castellanos  [address]  [facility] [address]        [place] [facility]    Chief Complaint   Patient presents with     penitentiary Acute       [place] Medical Record Number:  [id number]    HPI:    Angelito Castellanos is a 89 year old  (12/23/1925), who is being seen today for an episodic care visit at [facility].  HPI information obtained from: facility chart records, facility staff, patient report and [facility] chart review.Today's concern is:    Patient Angelito Castellanos is a 89 yr old male admitted to [facility] s/p hospitalization [facility] 5/3-5/22/18 for acute ischemic stroke with history chronic Afib, pacemaker, hypertension & urinary retention     Atrial fibrillation, unspecified type (H)  Other constipation  Generalized muscle weakness     Patient complete therapies & wishes to continue on walking program to maintain strength  Having regular stools, states occasional hard stools  Vitals stable       ALLERGIES: Review of patient's allergies indicates no known allergies.  Past Medical, Surgical, Family and Social History reviewed and updated in [facility].    Current Outpatient Prescriptions   Medication Sig Dispense Refill     apixaban ANTICOAGULANT (ELIQUIS) 5 MG tablet Take 1 tablet (5 mg) by mouth 2 times daily       atorvastatin (LIPITOR) 20 MG tablet Take 1 tablet (20 mg) by mouth daily 90 tablet 1     finasteride (PROSCAR) 5 MG tablet Take 1 tablet (5 mg) by mouth daily 30 tablet      metoprolol tartrate (LOPRESSOR) 25 MG tablet Take 0.5 tablets (12.5 mg) by mouth 2 times daily 60 tablet      senna-docusate (SENOKOT-S;PERICOLACE) 8.6-50 MG per tablet Take 3 tablets by mouth daily And 2 tabs bid prn       tamsulosin (FLOMAX) 0.4 MG capsule Take 0.8 mg by mouth every evening       Medications reviewed:  Medications reconciled to facility chart and changes were made to reflect current medications as identified as above med list. Below are the changes that  "were made:   Medications stopped since last EPIC medication reconciliation:   There are no discontinued medications.    Medications started since last [facility] medication reconciliation:  No orders of the defined types were placed in this encounter.        REVIEW OF SYSTEMS:  10 point ROS of systems including Constitutional, Eyes, Respiratory, Cardiovascular, Gastroenterology, Genitourinary, Integumentary, Muscularskeletal, Psychiatric were all negative except for pertinent positives noted in my HPI.    Physical Exam:  /82  Pulse 91  Temp 97.8  F (36.6  C)  Resp 16  Ht 5' 9\" (1.753 m)  Wt 152 lb 11.2 oz (69.3 kg)  SpO2 97%  BMI 22.55 kg/m2  GENERAL APPEARANCE:  Alert, in no distress  ENT:  Mouth and posterior oropharynx normal, moist mucous membranes, hard of hearing , uses pocket talker  EYES:  EOM, conjunctivae, lids, pupils and irises normal  NECK:  No adenopathy,masses or thyromegaly  RESP:  respiratory effort and palpation of chest normal, lungs clear to auscultation , no respiratory distress  CV:  Palpation and auscultation of heart done , regular rate and rhythm, no murmur, rub, or gallop  ABDOMEN:  normal bowel sounds, soft, nontender, no hepatosplenomegaly or other masses  M/S:   sitting in WC  SKIN:  Inspection of skin and subcutaneous tissue baseline  NEURO:   Cranial nerves 2-12 are normal tested and grossly at patient's baseline  PSYCH:  oriented X 3     BP: 127-150/68-89 mmHg  P: 64-91 bpm  Wt Readings from Last 5 Encounters:   06/25/18 152 lb 11.2 oz (69.3 kg)   06/20/18 152 lb 11.2 oz (69.3 kg)   06/13/18 154 lb 3.2 oz (69.9 kg)   06/11/18 151 lb 1.6 oz (68.5 kg)   06/08/18 151 lb 1.6 oz (68.5 kg)       Recent Labs:     CBC RESULTS:   Recent Labs   Lab Test  06/04/18   0655  05/25/18   0607   WBC  6.0  6.0   RBC  4.22*  4.24*   HGB  13.0*  13.1*   HCT  38.5*  38.3*   MCV  91  90   MCH  30.8  30.9   MCHC  33.8  34.2   RDW  14.0  13.8   PLT  146*  166       Last Basic Metabolic Panel:  Recent " Labs   Lab Test  06/04/18   0655  05/25/18   0607   NA  144  143   POTASSIUM  3.8  3.9   CHLORIDE  110*  108   EDMUNDO  8.3*  8.5   CO2  29  29   BUN  23  16   CR  0.93  1.00   GLC  120*  116*       TSH   Date Value Ref Range Status   04/29/2018 2.33 0.40 - 4.00 mU/L Final       Lab Results   Component Value Date    A1C 7.0 04/29/2018       Assessment/Plan:     Atrial fibrillation, unspecified type (H)  Other constipation  Generalized muscle weakness    blood pressure controlled  Heart rate controlled  Continue on Metoprolol  Remains on Eliquis with no complications noted    Increase Senna S, monitor     Restorative walking program   involved in safe plan home      Electronically signed by  GREGORY Flanagan CNP                      Sincerely,        GREGORY Flanagan CNP

## 2018-06-25 NOTE — PROGRESS NOTES
[place] [facility]    Chief Complaint   Patient presents with     MCFP Acute       [place] Medical Record Number:  [id number]    HPI:    Angelito Castellanos is a 89 year old  (12/23/1925), who is being seen today for an episodic care visit at [facility].  HPI information obtained from: facility chart records, facility staff, patient report and [facility] chart review.Today's concern is:    Patient Angelito Castellanos is a 89 yr old male admitted to [facility] s/p hospitalization [facility]3-5/[facility] for acute ischemic stroke with history chronic Afib, pacemaker, hypertension & urinary retention     Atrial fibrillation, unspecified type (H)  Other constipation  Generalized muscle weakness     Patient complete therapies & wishes to continue on walking program to maintain strength  Having regular stools, states occasional hard stools  Vitals stable       ALLERGIES: Review of patient's allergies indicates no known allergies.  Past Medical, Surgical, Family and Social History reviewed and updated in [facility].    Current Outpatient Prescriptions   Medication Sig Dispense Refill     apixaban ANTICOAGULANT (ELIQUIS) 5 MG tablet Take 1 tablet (5 mg) by mouth 2 times daily       atorvastatin (LIPITOR) 20 MG tablet Take 1 tablet (20 mg) by mouth daily 90 tablet 1     finasteride (PROSCAR) 5 MG tablet Take 1 tablet (5 mg) by mouth daily 30 tablet      metoprolol tartrate (LOPRESSOR) 25 MG tablet Take 0.5 tablets (12.5 mg) by mouth 2 times daily 60 tablet      senna-docusate (SENOKOT-S;PERICOLACE) 8.6-50 MG per tablet Take 3 tablets by mouth daily And 2 tabs bid prn       tamsulosin (FLOMAX) 0.4 MG capsule Take 0.8 mg by mouth every evening       Medications reviewed:  Medications reconciled to facility chart and changes were made to reflect current medications as identified as above med list. Below are the changes that were made:   Medications stopped since last EPIC medication reconciliation:   There are  "no discontinued medications.    Medications started since last [facility] medication reconciliation:  No orders of the defined types were placed in this encounter.        REVIEW OF SYSTEMS:  10 point ROS of systems including Constitutional, Eyes, Respiratory, Cardiovascular, Gastroenterology, Genitourinary, Integumentary, Muscularskeletal, Psychiatric were all negative except for pertinent positives noted in my HPI.    Physical Exam:  /82  Pulse 91  Temp 97.8  F (36.6  C)  Resp 16  Ht 5' 9\" (1.753 m)  Wt 152 lb 11.2 oz (69.3 kg)  SpO2 97%  BMI 22.55 kg/m2  GENERAL APPEARANCE:  Alert, in no distress  ENT:  Mouth and posterior oropharynx normal, moist mucous membranes, hard of hearing , uses pocket talker  EYES:  EOM, conjunctivae, lids, pupils and irises normal  NECK:  No adenopathy,masses or thyromegaly  RESP:  respiratory effort and palpation of chest normal, lungs clear to auscultation , no respiratory distress  CV:  Palpation and auscultation of heart done , regular rate and rhythm, no murmur, rub, or gallop  ABDOMEN:  normal bowel sounds, soft, nontender, no hepatosplenomegaly or other masses  M/S:   sitting in WC  SKIN:  Inspection of skin and subcutaneous tissue baseline  NEURO:   Cranial nerves 2-12 are normal tested and grossly at patient's baseline  PSYCH:  oriented X 3     BP: 127-150/68-89 mmHg  P: 64-91 bpm  Wt Readings from Last 5 Encounters:   06/25/18 152 lb 11.2 oz (69.3 kg)   06/20/18 152 lb 11.2 oz (69.3 kg)   06/13/18 154 lb 3.2 oz (69.9 kg)   06/11/18 151 lb 1.6 oz (68.5 kg)   06/08/18 151 lb 1.6 oz (68.5 kg)       Recent Labs:     CBC RESULTS:   Recent Labs   Lab Test  06/04/18   0655  05/25/18   0607   WBC  6.0  6.0   RBC  4.22*  4.24*   HGB  13.0*  13.1*   HCT  38.5*  38.3*   MCV  91  90   MCH  30.8  30.9   MCHC  33.8  34.2   RDW  14.0  13.8   PLT  146*  166       Last Basic Metabolic Panel:  Recent Labs   Lab Test  06/04/18   0655  05/25/18   0607   NA  144  143   POTASSIUM  3.8  3.9 "   CHLORIDE  110*  108   EDMUNDO  8.3*  8.5   CO2  29  29   BUN  23  16   CR  0.93  1.00   GLC  120*  116*       TSH   Date Value Ref Range Status   04/29/2018 2.33 0.40 - 4.00 mU/L Final       Lab Results   Component Value Date    A1C 7.0 04/29/2018       Assessment/Plan:     Atrial fibrillation, unspecified type (H)  Other constipation  Generalized muscle weakness    blood pressure controlled  Heart rate controlled  Continue on Metoprolol  Remains on Eliquis with no complications noted    Increase Senna S, monitor     Restorative walking program   involved in safe plan home      Electronically signed by  GREGORY Flanagan CNP

## 2018-06-27 ENCOUNTER — DISCHARGE SUMMARY NURSING HOME (OUTPATIENT)
Dept: GERIATRICS | Facility: CLINIC | Age: 83
End: 2018-06-27
Payer: COMMERCIAL

## 2018-06-27 VITALS
DIASTOLIC BLOOD PRESSURE: 65 MMHG | HEIGHT: 70 IN | BODY MASS INDEX: 22.05 KG/M2 | HEART RATE: 63 BPM | SYSTOLIC BLOOD PRESSURE: 99 MMHG | TEMPERATURE: 98.3 F | RESPIRATION RATE: 16 BRPM | WEIGHT: 154 LBS | OXYGEN SATURATION: 92 %

## 2018-06-27 DIAGNOSIS — I63.9 CEREBROVASCULAR ACCIDENT (CVA), UNSPECIFIED MECHANISM (H): Primary | ICD-10-CM

## 2018-06-27 DIAGNOSIS — I48.91 ATRIAL FIBRILLATION, UNSPECIFIED TYPE (H): ICD-10-CM

## 2018-06-27 DIAGNOSIS — I10 HYPERTENSION, UNSPECIFIED TYPE: ICD-10-CM

## 2018-06-27 PROCEDURE — 99316 NF DSCHRG MGMT 30 MIN+: CPT | Performed by: NURSE PRACTITIONER

## 2018-06-27 NOTE — PROGRESS NOTES
[place] [facility] DISCHARGE SUMMARY    PATIENT'S NAME: Angelito Castellanos  YOB: 1925  MEDICAL RECORD NUMBER:  [id number]    PRIMARY CARE PROVIDER AND CLINIC RESPONSIBLE AFTER TRANSFER: Gerardo Adhikari [facility] [address] [facility] [address] / [facility] [address]     CODE STATUS/ADVANCE DIRECTIVES DISCUSSION:   DNR / DNI     No Known Allergies    TRANSFERRING PROVIDERS: GREGORY Flanagan CNP; Felipe Maher MD  DATE OF SNF ADMISSION:  May / 22 / 2018DATE OF SNF (anticipated) DISCHARGE: July / 02 / 2018  DISCHARGE DISPOSITION: Non-FMG Provider     Nursing Facility: [facility] stay 5/3/18 to 5/22/18.     Condition on Discharge:  Stable.  Function/ Cognitive Scores/     Equipment:   Walking 300 feet with fww, sba   Stairs: sba-cga, 12 stairs   STS: sba   Transfers:s ba   ST   Mod impairment; rec total assist with IADLS.   Will move to memory care at [facility] when m.a. Opens.  Needs cga for ADLs, cues, sequencing   SLUMS: 9/30   CPT: 4.5   Recommend 24 hour supervision, assist with meals, finance, meds      DISCHARGE DIAGNOSIS:   1. Cerebrovascular accident (CVA), unspecified mechanism (H)    2. Hypertension, unspecified type    3. Atrial fibrillation, unspecified type (H)        HPI Nursing Facility Course:    HPI information obtained from: facility chart records, facility staff, patient report and [facility] chart review.    Patient Angelito Castellanos is a 89 yr old male admitted to [facility] s/p hospitalization [facility] 5/3-5/22/18 for acute ischemic stroke with history chronic Afib, pacemaker, hypertension & urinary retention    Current issues are:        Acute ischemic stroke with large vessel occlusion   CT Head 4/30: Evolving ischemic infarcts in the right and left middle cerebral artery territories.Diffuse cerebral volume loss and cerebral white matter changes consistent with chronic small vessel ischemic disease    Presented with left hemiparesis, dysarthria & dysphagia   Now on regular diet, tolerating with no complications  Now on Apixaban, had been on aspirin prior, tolerating with no complications  On lipitor  follow up neurology ([facility]) 10/18  Progressing in therapies, completed therapies on TCU & plan for homecare therapies  Restorative nursing program, has red tag on walker at this time with assist with ambulation  hypertension, Afib  On Metoprolol  On Apixaban, has PPM  Vitals stable, hr managed  Not appear fluid up  Follow up cardiologist   Urinary retention  On flomax & finasteride, has urinary incontinence; negative UC  Checking post void residual, patient denies urinary retention or signs and symptoms urinary tract infection  Does retain urine at times ~100-250cc; patient does not want catheter in and desires to allow some retention with plan follow up urologist  follow up urology 7/12/18  Toilet program    Constipation  On senna s , hold for loose stools. Having reg BMs    Advanced care planning  Reviewed goals & wishes & patient desires to be DNR/DNI    CODE STATUS/ADVANCE DIRECTIVES DISCUSSION:   DNR / DNI  Patient's living condition: lives with adult children      PAST MEDICAL HISTORY:  has a past medical history of Chronic atrial fibrillation (H); [place] (hard of hearing); and Prediabetes.    DISCHARGE MEDICATIONS:  Current Outpatient Prescriptions   Medication Sig Dispense Refill     apixaban ANTICOAGULANT (ELIQUIS) 5 MG tablet Take 1 tablet (5 mg) by mouth 2 times daily       atorvastatin (LIPITOR) 20 MG tablet Take 1 tablet (20 mg) by mouth daily 90 tablet 1     finasteride (PROSCAR) 5 MG tablet Take 1 tablet (5 mg) by mouth daily 30 tablet      metoprolol tartrate (LOPRESSOR) 25 MG tablet Take 0.5 tablets (12.5 mg) by mouth 2 times daily 60 tablet      senna-docusate (SENOKOT-S;PERICOLACE) 8.6-50 MG per tablet Take 2 tablets by mouth 2 times daily And 2 tabs bid prn       tamsulosin (FLOMAX) 0.4 MG capsule  "Take 0.8 mg by mouth every evening         MEDICATION CHANGES/RATIONALE:     Controlled medications sent with patient:   not applicable/none     ROS:    10 point ROS of systems including Constitutional, Eyes, Respiratory, Cardiovascular, Gastroenterology, Genitourinary, Integumentary, Muscularskeletal, Psychiatric were all negative except for pertinent positives noted in my HPI.    Physical Exam:   Vitals: BP 99/65  Pulse 63  Temp 98.3  F (36.8  C)  Resp 16  Ht 5' 10\" (1.778 m)  Wt 154 lb (69.9 kg)  SpO2 92%  BMI 22.1 kg/m2  BMI= Body mass index is 22.1 kg/(m^2).    GENERAL APPEARANCE:  Alert, in no distress  ENT:  Mouth and posterior oropharynx normal, moist mucous membranes  EYES:  EOM, conjunctivae, lids, pupils and irises normal, hard of hearing, uses pocket talker  NECK:  No adenopathy,masses or thyromegaly  RESP:  respiratory effort and palpation of chest normal, lungs clear to auscultation , no respiratory distress  CV:  Palpation and auscultation of heart done , regular rate and rhythm, no murmur, rub, or gallop  ABDOMEN:  normal bowel sounds, soft, nontender, no hepatosplenomegaly or other masses  M/S:  Sitting in WC  SKIN:  Inspection of skin and subcutaneous tissue baseline  NEURO:   Cranial nerves 2-12 are normal tested and grossly at patient's baseline  PSYCH:  oriented X 3    BP Readings from Last 3 Encounters:   06/27/18 99/65   06/25/18 150/82   06/20/18 138/84     Pulse Readings from Last 4 Encounters:   06/27/18 63   06/25/18 91   06/20/18 77   06/13/18 77     Wt Readings from Last 5 Encounters:   06/27/18 154 lb (69.9 kg)   06/25/18 152 lb 11.2 oz (69.3 kg)   06/20/18 152 lb 11.2 oz (69.3 kg)   06/13/18 154 lb 3.2 oz (69.9 kg)   06/11/18 151 lb 1.6 oz (68.5 kg)       DISCHARGE PLAN:  Occupational Therapy, Physical Therapy, Registered Nurse and Home Health Aide  Patient instructed to follow-up with:  PCP in 7 days      [facility] scheduled appointments:  Future Appointments  Date Time " Provider Department Center   10/30/2018 1:20 PM Phu Kerr MD [facility]       MTM referral needed and placed by this provider: No    SNF labs   CBC RESULTS:   Recent Labs   Lab Test  06/04/18   0655  05/25/18   0607   WBC  6.0  6.0   RBC  4.22*  4.24*   HGB  13.0*  13.1*   HCT  38.5*  38.3*   MCV  91  90   MCH  30.8  30.9   MCHC  33.8  34.2   RDW  14.0  13.8   PLT  146*  166       Last Basic Metabolic Panel:  Recent Labs   Lab Test  06/04/18   0655  05/25/18   0607   NA  144  143   POTASSIUM  3.8  3.9   CHLORIDE  110*  108   EDMUNDO  8.3*  8.5   CO2  29  29   BUN  23  16   CR  0.93  1.00   GLC  120*  116*       TSH   Date Value Ref Range Status   04/29/2018 2.33 0.40 - 4.00 mU/L Final       Lab Results   Component Value Date    A1C 7.0 04/29/2018           TOTAL DISCHARGE TIME:   Greater than 30 minutes  Electronically signed by:  GREGORY Flanagan CNP

## 2018-06-27 NOTE — LETTER
6/27/2018        RE: Angelito Castellanos  [address]  [facility] [address]          [place] [facility] DISCHARGE SUMMARY    PATIENT'S NAME: Angelito Castellanos  YOB: 1925  MEDICAL RECORD NUMBER:  [id number]    PRIMARY CARE PROVIDER AND CLINIC RESPONSIBLE AFTER TRANSFER: Gerardo Adhikari [facility] [address] [place] [address] / [facility] [address]     CODE STATUS/ADVANCE DIRECTIVES DISCUSSION:   DNR / DNI     No Known Allergies    TRANSFERRING PROVIDERS: GREGORY Flanagan CNP; Felipe Maher MD  DATE OF SNF ADMISSION:  May / 22 / 2018DATE OF SNF (anticipated) DISCHARGE: July / 02 / 2018  DISCHARGE DISPOSITION: Non-FMG Provider     Nursing Facility: [facility] stay 5/3/18 to 5/22/18.     Condition on Discharge:  Stable.  Function/ Cognitive Scores/     Equipment:   Walking 300 feet with fww, sba   Stairs: sba-cga, 12 stairs   STS: sba   Transfers:s ba   ST   Mod impairment; rec total assist with IADLS.   Will move to memory care at [facility] when m.a. Opens.  Needs cga for ADLs, cues, sequencing   SLUMS: 9/30   CPT: 4.5   Recommend 24 hour supervision, assist with meals, finance, meds      DISCHARGE DIAGNOSIS:   1. Cerebrovascular accident (CVA), unspecified mechanism (H)    2. Hypertension, unspecified type    3. Atrial fibrillation, unspecified type (H)        HPI Nursing Facility Course:    HPI information obtained from: facility chart records, facility staff, patient report and [facility] chart review.    Patient Angelito Castellanos is a 89 yr old male admitted to [facility] s/p hospitalization [facility] 5/3-5/22/18 for acute ischemic stroke with history chronic Afib, pacemaker, hypertension & urinary retention    Current issues are:        Acute ischemic stroke with large vessel occlusion   CT Head 4/30: Evolving ischemic infarcts in the right and left middle cerebral artery territories.Diffuse cerebral volume loss and  cerebral white matter changes consistent with chronic small vessel ischemic disease   Presented with left hemiparesis, dysarthria & dysphagia   Now on regular diet, tolerating with no complications  Now on Apixaban, had been on aspirin prior, tolerating with no complications  On lipitor  follow up neurology ([facility]) 10/18  Progressing in therapies, completed therapies on TCU & plan for homecare therapies  Restorative nursing program, has red tag on walker at this time with assist with ambulation  hypertension, Afib  On Metoprolol  On Apixaban, has PPM  Vitals stable, hr managed  Not appear fluid up  Follow up cardiologist   Urinary retention  On flomax & finasteride, has urinary incontinence; negative UC  Checking post void residual, patient denies urinary retention or signs and symptoms urinary tract infection  Does retain urine at times ~100-250cc; patient does not want catheter in and desires to allow some retention with plan follow up urologist  follow up urology 7/12/18  Toilet program    Constipation  On senna s , hold for loose stools. Having reg BMs    Advanced care planning  Reviewed goals & wishes & patient desires to be DNR/DNI    CODE STATUS/ADVANCE DIRECTIVES DISCUSSION:   DNR / DNI  Patient's living condition: lives with adult children      PAST MEDICAL HISTORY:  has a past medical history of Chronic atrial fibrillation (H); [place] (hard of hearing); and Prediabetes.    DISCHARGE MEDICATIONS:  Current Outpatient Prescriptions   Medication Sig Dispense Refill     apixaban ANTICOAGULANT (ELIQUIS) 5 MG tablet Take 1 tablet (5 mg) by mouth 2 times daily       atorvastatin (LIPITOR) 20 MG tablet Take 1 tablet (20 mg) by mouth daily 90 tablet 1     finasteride (PROSCAR) 5 MG tablet Take 1 tablet (5 mg) by mouth daily 30 tablet      metoprolol tartrate (LOPRESSOR) 25 MG tablet Take 0.5 tablets (12.5 mg) by mouth 2 times daily 60 tablet      senna-docusate (SENOKOT-S;PERICOLACE) 8.6-50 MG per tablet Take 2 tablets  "by mouth 2 times daily And 2 tabs bid prn       tamsulosin (FLOMAX) 0.4 MG capsule Take 0.8 mg by mouth every evening         MEDICATION CHANGES/RATIONALE:     Controlled medications sent with patient:   not applicable/none     ROS:    10 point ROS of systems including Constitutional, Eyes, Respiratory, Cardiovascular, Gastroenterology, Genitourinary, Integumentary, Muscularskeletal, Psychiatric were all negative except for pertinent positives noted in my HPI.    Physical Exam:   Vitals: BP 99/65  Pulse 63  Temp 98.3  F (36.8  C)  Resp 16  Ht 5' 10\" (1.778 m)  Wt 154 lb (69.9 kg)  SpO2 92%  BMI 22.1 kg/m2  BMI= Body mass index is 22.1 kg/(m^2).    GENERAL APPEARANCE:  Alert, in no distress  ENT:  Mouth and posterior oropharynx normal, moist mucous membranes  EYES:  EOM, conjunctivae, lids, pupils and irises normal, hard of hearing, uses pocket talker  NECK:  No adenopathy,masses or thyromegaly  RESP:  respiratory effort and palpation of chest normal, lungs clear to auscultation , no respiratory distress  CV:  Palpation and auscultation of heart done , regular rate and rhythm, no murmur, rub, or gallop  ABDOMEN:  normal bowel sounds, soft, nontender, no hepatosplenomegaly or other masses  M/S:  Sitting in WC  SKIN:  Inspection of skin and subcutaneous tissue baseline  NEURO:   Cranial nerves 2-12 are normal tested and grossly at patient's baseline  PSYCH:  oriented X 3    BP Readings from Last 3 Encounters:   06/27/18 99/65   06/25/18 150/82   06/20/18 138/84     Pulse Readings from Last 4 Encounters:   06/27/18 63   06/25/18 91   06/20/18 77   06/13/18 77     Wt Readings from Last 5 Encounters:   06/27/18 154 lb (69.9 kg)   06/25/18 152 lb 11.2 oz (69.3 kg)   06/20/18 152 lb 11.2 oz (69.3 kg)   06/13/18 154 lb 3.2 oz (69.9 kg)   06/11/18 151 lb 1.6 oz (68.5 kg)       DISCHARGE PLAN:  Occupational Therapy, Physical Therapy, Registered Nurse and Home Health Aide  Patient instructed to follow-up with:  PCP in 7 " days      Current [place] scheduled appointments:  Future Appointments  Date Time Provider Department Center   10/30/2018 1:20 PM Phu Kerr MD [facility]       MTM referral needed and placed by this provider: No    SNF labs   CBC RESULTS:   Recent Labs   Lab Test  06/04/18   0655  05/25/18   0607   WBC  6.0  6.0   RBC  4.22*  4.24*   HGB  13.0*  13.1*   HCT  38.5*  38.3*   MCV  91  90   MCH  30.8  30.9   MCHC  33.8  34.2   RDW  14.0  13.8   PLT  146*  166       Last Basic Metabolic Panel:  Recent Labs   Lab Test  06/04/18   0655  05/25/18   0607   NA  144  143   POTASSIUM  3.8  3.9   CHLORIDE  110*  108   EDMUNDO  8.3*  8.5   CO2  29  29   BUN  23  16   CR  0.93  1.00   GLC  120*  116*       TSH   Date Value Ref Range Status   04/29/2018 2.33 0.40 - 4.00 mU/L Final       Lab Results   Component Value Date    A1C 7.0 04/29/2018           TOTAL DISCHARGE TIME:   Greater than 30 minutes  Electronically signed by:  GREGORY Flanagan CNP      Sincerely,        GREGORY Flanagan CNP

## 2018-06-27 NOTE — LETTER
6/27/2018        RE: Angelito Castellanos  [address]  [facility] [address]          [place] [facility] DISCHARGE SUMMARY    PATIENT'S NAME: Angelito Castellanos  YOB: 1925  MEDICAL RECORD NUMBER:  [id number]    PRIMARY CARE PROVIDER AND CLINIC RESPONSIBLE AFTER TRANSFER: Gerardo Adhikari [facility] [address] / [facility] [address]     CODE STATUS/ADVANCE DIRECTIVES DISCUSSION:   DNR / DNI     No Known Allergies    TRANSFERRING PROVIDERS: GREGORY Flanagan CNP; Felipe Maher MD  DATE OF SNF ADMISSION:  May / 22 / 2018DATE OF SNF (anticipated) DISCHARGE: July / 02 / 2018  DISCHARGE DISPOSITION: Non-FMG Provider     Nursing Facility: [facility] stay 5/3/18 to 5/22/18.     Condition on Discharge:  Stable.  Function/ Cognitive Scores/     Equipment:   Walking 300 feet with fww, sba   Stairs: sba-cga, 12 stairs   STS: sba   Transfers:s ba   ST   Mod impairment; rec total assist with IADLS.   Will move to memory care at [facility] when m.a. Opens.  Needs cga for ADLs, cues, sequencing   SLUMS: 9/30   CPT: 4.5   Recommend 24 hour supervision, assist with meals, finance, meds      DISCHARGE DIAGNOSIS:   1. Cerebrovascular accident (CVA), unspecified mechanism (H)    2. Hypertension, unspecified type    3. Atrial fibrillation, unspecified type (H)        HPI Nursing Facility Course:    HPI information obtained from: facility chart records, facility staff, patient report and [facility] chart review.    Patient Angelito Castellanos is a 89 yr old male admitted to [facility] s/p hospitalization [facility] 5/3-5/22/18 for acute ischemic stroke with history chronic Afib, pacemaker, hypertension & urinary retention    Current issues are:        Acute ischemic stroke with large vessel occlusion   CT Head 4/30: Evolving ischemic infarcts in the right and left middle cerebral artery territories.Diffuse cerebral volume loss and  cerebral white matter changes consistent with chronic small vessel ischemic disease   Presented with left hemiparesis, dysarthria & dysphagia   Now on regular diet, tolerating with no complications  Now on Apixaban, had been on aspirin prior, tolerating with no complications  On lipitor  follow up neurology ([facility]) 10/18  Progressing in therapies, completed therapies on TCU & plan for homecare therapies  Restorative nursing program, has red tag on walker at this time with assist with ambulation  hypertension, Afib  On Metoprolol  On Apixaban, has PPM  Vitals stable, hr managed  Not appear fluid up  Follow up cardiologist   Urinary retention  On flomax & finasteride, has urinary incontinence; negative UC  Checking post void residual, patient denies urinary retention or signs and symptoms urinary tract infection  Does retain urine at times ~100-250cc; patient does not want catheter in and desires to allow some retention with plan follow up urologist  follow up urology 7/12/18  Toilet program    Constipation  On senna s , hold for loose stools. Having reg BMs    Advanced care planning  Reviewed goals & wishes & patient desires to be DNR/DNI    CODE STATUS/ADVANCE DIRECTIVES DISCUSSION:   DNR / DNI  Patient's living condition: lives with adult children      PAST MEDICAL HISTORY:  has a past medical history of Chronic atrial fibrillation (H); [place] (hard of hearing); and Prediabetes.    DISCHARGE MEDICATIONS:  Current Outpatient Prescriptions   Medication Sig Dispense Refill     apixaban ANTICOAGULANT (ELIQUIS) 5 MG tablet Take 1 tablet (5 mg) by mouth 2 times daily       atorvastatin (LIPITOR) 20 MG tablet Take 1 tablet (20 mg) by mouth daily 90 tablet 1     finasteride (PROSCAR) 5 MG tablet Take 1 tablet (5 mg) by mouth daily 30 tablet      metoprolol tartrate (LOPRESSOR) 25 MG tablet Take 0.5 tablets (12.5 mg) by mouth 2 times daily 60 tablet      senna-docusate (SENOKOT-S;PERICOLACE) 8.6-50 MG per tablet Take 2 tablets  "by mouth 2 times daily And 2 tabs bid prn       tamsulosin (FLOMAX) 0.4 MG capsule Take 0.8 mg by mouth every evening         MEDICATION CHANGES/RATIONALE:     Controlled medications sent with patient:   not applicable/none     ROS:    10 point ROS of systems including Constitutional, Eyes, Respiratory, Cardiovascular, Gastroenterology, Genitourinary, Integumentary, Muscularskeletal, Psychiatric were all negative except for pertinent positives noted in my HPI.    Physical Exam:   Vitals: BP 99/65  Pulse 63  Temp 98.3  F (36.8  C)  Resp 16  Ht 5' 10\" (1.778 m)  Wt 154 lb (69.9 kg)  SpO2 92%  BMI 22.1 kg/m2  BMI= Body mass index is 22.1 kg/(m^2).    GENERAL APPEARANCE:  Alert, in no distress  ENT:  Mouth and posterior oropharynx normal, moist mucous membranes  EYES:  EOM, conjunctivae, lids, pupils and irises normal, hard of hearing, uses pocket talker  NECK:  No adenopathy,masses or thyromegaly  RESP:  respiratory effort and palpation of chest normal, lungs clear to auscultation , no respiratory distress  CV:  Palpation and auscultation of heart done , regular rate and rhythm, no murmur, rub, or gallop  ABDOMEN:  normal bowel sounds, soft, nontender, no hepatosplenomegaly or other masses  M/S:  Sitting in WC  SKIN:  Inspection of skin and subcutaneous tissue baseline  NEURO:   Cranial nerves 2-12 are normal tested and grossly at patient's baseline  PSYCH:  oriented X 3    BP Readings from Last 3 Encounters:   06/27/18 99/65   06/25/18 150/82   06/20/18 138/84     Pulse Readings from Last 4 Encounters:   06/27/18 63   06/25/18 91   06/20/18 77   06/13/18 77     Wt Readings from Last 5 Encounters:   06/27/18 154 lb (69.9 kg)   06/25/18 152 lb 11.2 oz (69.3 kg)   06/20/18 152 lb 11.2 oz (69.3 kg)   06/13/18 154 lb 3.2 oz (69.9 kg)   06/11/18 151 lb 1.6 oz (68.5 kg)       DISCHARGE PLAN:  Occupational Therapy, Physical Therapy, Registered Nurse and Home Health Aide  Patient instructed to follow-up with:  PCP in 7 " days      Current [place] scheduled appointments:  Future Appointments  Date Time Provider Department Center   10/30/2018 1:20 PM Phu Kerr MD [facility]       MTM referral needed and placed by this provider: No    SNF labs   CBC RESULTS:   Recent Labs   Lab Test  06/04/18   0655  05/25/18   0607   WBC  6.0  6.0   RBC  4.22*  4.24*   HGB  13.0*  13.1*   HCT  38.5*  38.3*   MCV  91  90   MCH  30.8  30.9   MCHC  33.8  34.2   RDW  14.0  13.8   PLT  146*  166       Last Basic Metabolic Panel:  Recent Labs   Lab Test  06/04/18   0655  05/25/18   0607   NA  144  143   POTASSIUM  3.8  3.9   CHLORIDE  110*  108   EDMUNDO  8.3*  8.5   CO2  29  29   BUN  23  16   CR  0.93  1.00   GLC  120*  116*       TSH   Date Value Ref Range Status   04/29/2018 2.33 0.40 - 4.00 mU/L Final       Lab Results   Component Value Date    A1C 7.0 04/29/2018           TOTAL DISCHARGE TIME:   Greater than 30 minutes  Electronically signed by:  GREGORY Flanagan CNP      Sincerely,        GREGORY Flanagan CNP

## 2018-07-13 ENCOUNTER — NURSING HOME VISIT (OUTPATIENT)
Dept: GERIATRICS | Facility: CLINIC | Age: 83
End: 2018-07-13
Payer: COMMERCIAL

## 2018-07-13 VITALS
OXYGEN SATURATION: 98 % | HEART RATE: 68 BPM | TEMPERATURE: 98.2 F | RESPIRATION RATE: 20 BRPM | HEIGHT: 70 IN | WEIGHT: 149.8 LBS | BODY MASS INDEX: 21.45 KG/M2 | DIASTOLIC BLOOD PRESSURE: 66 MMHG | SYSTOLIC BLOOD PRESSURE: 112 MMHG

## 2018-07-13 DIAGNOSIS — Z53.9 ERRONEOUS ENCOUNTER--DISREGARD: Primary | ICD-10-CM

## 2018-07-18 ENCOUNTER — NURSING HOME VISIT (OUTPATIENT)
Dept: GERIATRICS | Facility: CLINIC | Age: 83
End: 2018-07-18
Payer: COMMERCIAL

## 2018-07-18 VITALS
HEART RATE: 76 BPM | SYSTOLIC BLOOD PRESSURE: 108 MMHG | DIASTOLIC BLOOD PRESSURE: 67 MMHG | TEMPERATURE: 98.2 F | BODY MASS INDEX: 21.49 KG/M2 | WEIGHT: 149.8 LBS | OXYGEN SATURATION: 99 % | RESPIRATION RATE: 16 BRPM

## 2018-07-18 DIAGNOSIS — M62.81 GENERALIZED MUSCLE WEAKNESS: ICD-10-CM
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
## 2018-07-18 DIAGNOSIS — R33.9 URINARY RETENTION: ICD-10-CM

## 2018-07-18 DIAGNOSIS — I63.9 CEREBROVASCULAR ACCIDENT (CVA), UNSPECIFIED MECHANISM (H): ICD-10-CM

## 2018-07-18 PROCEDURE — 99309 SBSQ NF CARE MODERATE MDM 30: CPT | Performed by: NURSE PRACTITIONER

## 2018-07-18 RX ORDER — BISACODYL 10 MG
10 SUPPOSITORY, RECTAL RECTAL DAILY PRN
COMMUNITY
End: 2018-07-30

## 2018-07-18 NOTE — LETTER
7/18/2018        RE: Angelito Castellanos  [address]  [facility] [address]        [place] [facility]    Chief Complaint   Patient presents with     RECHECK       [place] Medical Record Number:  [id number]    HPI:    Angelito Castellanos is a 89 year old  (12/23/1925), who is being seen today for an episodic care visit at [facility].      HPI information obtained from: facility chart records, facility staff and patient report.    Patient Angelito Castellanos is a 89 yr old male [facility] for acute ischemic stroke with left sided weakness & history chronic Afib, pacemaker, hypertension & urinary retention      Today's concern is:     Fall, subsequent encounter  Cerebrovascular accident (CVA), unspecified mechanism (H)  Generalized muscle weakness   Urinary retention  Recent falls at TCU, denies injury (once while self transfer in bathroom & today when reaching for his shoe horn and was not using walker & states weakness on left side s/p stroke  Patient report not been doing restorative walking with staff   Denies any other concern, denies nausea  [facility] [place] [facility]  Patient report voiding within normal limits & improved empty out      ALLERGIES: Review of patient's allergies indicates no known allergies.  Past Medical, Surgical, Family and Social History reviewed and updated in [employer].    Current Outpatient Prescriptions   Medication Sig Dispense Refill     apixaban ANTICOAGULANT (ELIQUIS) 5 MG tablet Take 1 tablet (5 mg) by mouth 2 times daily       atorvastatin (LIPITOR) 20 MG tablet Take 1 tablet (20 mg) by mouth daily 90 tablet 1     bisacodyl (DULCOLAX) 10 MG Suppository Place 10 mg rectally daily as needed for constipation       finasteride (PROSCAR) 5 MG tablet Take 1 tablet (5 mg) by mouth daily 30 tablet      metoprolol tartrate (LOPRESSOR) 25 MG tablet Take 0.5 tablets (12.5 mg) by mouth 2 times daily  60 tablet      senna-docusate (SENOKOT-S;PERICOLACE) 8.6-50 MG per tablet Take 2 tablets by mouth 2 times daily And 2 tabs bid prn       tamsulosin (FLOMAX) 0.4 MG capsule Take 0.8 mg by mouth every evening       Medications reviewed:  Medications reconciled to facility chart and changes were made to reflect current medications as identified as above med list. Below are the changes that were made:   Medications stopped since last EPIC medication reconciliation:   There are no discontinued medications.    Medications started since last [facility] medication reconciliation:  Orders Placed This Encounter   Medications     bisacodyl (DULCOLAX) 10 MG Suppository     Sig: Place 10 mg rectally daily as needed for constipation       REVIEW OF SYSTEMS:  10 point ROS of systems including Constitutional, Eyes, Respiratory, Cardiovascular, Gastroenterology, Genitourinary, Integumentary, Muscularskeletal, Psychiatric were all negative except for pertinent positives noted in my HPI.    Physical Exam:  /67  Pulse 76  Temp 98.2  F (36.8  C)  Resp 16  Wt 149 lb 12.8 oz (67.9 kg)  SpO2 99%  BMI 21.49 kg/m2  GENERAL APPEARANCE:  Alert, in no distress  ENT:  Mouth and posterior oropharynx normal, moist mucous membranes, uses pocket talker  EYES:  EOM, conjunctivae, lids, pupils and irises normal  NECK:  No adenopathy,masses or thyromegaly  RESP:  respiratory effort and palpation of chest normal, lungs clear to auscultation , no respiratory distress  CV:  Palpation and auscultation of heart done , regular rate and rhythm, no murmur, rub, or gallop  ABDOMEN:  normal bowel sounds, soft, nontender, no hepatosplenomegaly or other masses  M/S:   sitting in WC  SKIN:  Inspection of skin and subcutaneous tissue baseline  NEURO:   Cranial nerves 2-12 are normal tested and grossly at patient's baseline  PSYCH:  oriented X 3     Pulse Readings from Last 4 Encounters:   07/18/18 76   07/13/18 68   06/27/18 63   06/25/18 91     BP Readings  from Last 3 Encounters:   07/18/18 108/67   07/13/18 112/66   06/27/18 99/65     Wt Readings from Last 4 Encounters:   07/18/18 149 lb 12.8 oz (67.9 kg)   07/13/18 149 lb 12.8 oz (67.9 kg)   06/27/18 154 lb (69.9 kg)   06/25/18 152 lb 11.2 oz (69.3 kg)         Recent Labs:     CBC RESULTS:   Recent Labs   Lab Test  06/04/18   0655  05/25/18   0607   WBC  6.0  6.0   RBC  4.22*  4.24*   HGB  13.0*  13.1*   HCT  38.5*  38.3*   MCV  91  90   MCH  30.8  30.9   MCHC  33.8  34.2   RDW  14.0  13.8   PLT  146*  166       Last Basic Metabolic Panel:  Recent Labs   Lab Test  06/04/18   0655  05/25/18   0607   NA  144  143   POTASSIUM  3.8  3.9   CHLORIDE  110*  108   EDMUNDO  8.3*  8.5   CO2  29  29   BUN  23  16   CR  0.93  1.00   GLC  120*  116*       TSH   Date Value Ref Range Status   04/29/2018 2.33 0.40 - 4.00 mU/L Final       Lab Results   Component Value Date    A1C 7.0 04/29/2018     Lab Results   Component Value Date    INR 1.03 04/29/2018         Assessment/Plan:     Fall, subsequent encounter  Cerebrovascular accident (CVA), unspecified mechanism (H)  Generalized muscle weakness   Urinary retention    Referral to therapies due to deconditioning   Restorative walking program with staff  Encourage use of walker,   Still pending [place] application so can transfer to [facility]     Vitals stable, hr managed, remains on metoprolol for Afib  Remains on Eliquis for CVA, no complications noted    Recently seen by urology with no new orders, reports post void residual ok  Will monitor, patient states voiding fine , continue flomax    Electronically signed by  GREGORY Flanagan CNP                      Sincerely,        GREGORY Flanagan CNP

## 2018-07-18 NOTE — PROGRESS NOTES
[place] [facility]    Chief Complaint   Patient presents with     PETER       [place] Medical Record Number:  [id number]    HPI:    Angelito Castellanos is a 89 year old  (12/23/1925), who is being seen today for an episodic care visit at [facility].      HPI information obtained from: facility chart records, facility staff and patient report.    Patient Angelito Castellanos is a 89 yr old male admitted to [facility]p [facility]/3-5/22/[facility] for acute ischemic stroke with left sided weakness & history chronic Afib, pacemaker, hypertension & urinary retention      Today's concern is:     Fall, subsequent encounter  Cerebrovascular accident (CVA), unspecified mechanism (H)  Generalized muscle weakness   Urinary retention  Recent falls at TCU, denies injury (once while self transfer in bathroom & today when reaching for his shoe horn and was not using walker & states weakness on left side s/p stroke  Patient report not been doing restorative walking with staff   Denies any other concern, denies nausea  [facility] [place] [facility]  Patient report voiding within normal limits & improved empty out      ALLERGIES: Review of patient's allergies indicates no known allergies.  Past Medical, Surgical, Family and Social History reviewed and updated in [employer].    Current Outpatient Prescriptions   Medication Sig Dispense Refill     apixaban ANTICOAGULANT (ELIQUIS) 5 MG tablet Take 1 tablet (5 mg) by mouth 2 times daily       atorvastatin (LIPITOR) 20 MG tablet Take 1 tablet (20 mg) by mouth daily 90 tablet 1     bisacodyl (DULCOLAX) 10 MG Suppository Place 10 mg rectally daily as needed for constipation       finasteride (PROSCAR) 5 MG tablet Take 1 tablet (5 mg) by mouth daily 30 tablet      metoprolol tartrate (LOPRESSOR) 25 MG tablet Take 0.5 tablets (12.5 mg) by mouth 2 times daily 60 tablet      senna-docusate (SENOKOT-S;PERICOLACE) 8.6-50 MG per tablet Take 2  tablets by mouth 2 times daily And 2 tabs bid prn       tamsulosin (FLOMAX) 0.4 MG capsule Take 0.8 mg by mouth every evening       Medications reviewed:  Medications reconciled to facility chart and changes were made to reflect current medications as identified as above med list. Below are the changes that were made:   Medications stopped since last EPIC medication reconciliation:   There are no discontinued medications.    Medications started since last [facility] medication reconciliation:  Orders Placed This Encounter   Medications     bisacodyl (DULCOLAX) 10 MG Suppository     Sig: Place 10 mg rectally daily as needed for constipation       REVIEW OF SYSTEMS:  10 point ROS of systems including Constitutional, Eyes, Respiratory, Cardiovascular, Gastroenterology, Genitourinary, Integumentary, Muscularskeletal, Psychiatric were all negative except for pertinent positives noted in my HPI.    Physical Exam:  /67  Pulse 76  Temp 98.2  F (36.8  C)  Resp 16  Wt 149 lb 12.8 oz (67.9 kg)  SpO2 99%  BMI 21.49 kg/m2  GENERAL APPEARANCE:  Alert, in no distress  ENT:  Mouth and posterior oropharynx normal, moist mucous membranes, uses pocket talker  EYES:  EOM, conjunctivae, lids, pupils and irises normal  NECK:  No adenopathy,masses or thyromegaly  RESP:  respiratory effort and palpation of chest normal, lungs clear to auscultation , no respiratory distress  CV:  Palpation and auscultation of heart done , regular rate and rhythm, no murmur, rub, or gallop  ABDOMEN:  normal bowel sounds, soft, nontender, no hepatosplenomegaly or other masses  M/S:   sitting in WC  SKIN:  Inspection of skin and subcutaneous tissue baseline  NEURO:   Cranial nerves 2-12 are normal tested and grossly at patient's baseline  PSYCH:  oriented X 3     Pulse Readings from Last 4 Encounters:   07/18/18 76   07/13/18 68   06/27/18 63   06/25/18 91     BP Readings from Last 3 Encounters:   07/18/18 108/67   07/13/18 112/66   06/27/18 99/65     Wt  Readings from Last 4 Encounters:   07/18/18 149 lb 12.8 oz (67.9 kg)   07/13/18 149 lb 12.8 oz (67.9 kg)   06/27/18 154 lb (69.9 kg)   06/25/18 152 lb 11.2 oz (69.3 kg)         Recent Labs:     CBC RESULTS:   Recent Labs   Lab Test  06/04/18   0655  05/25/18   0607   WBC  6.0  6.0   RBC  4.22*  4.24*   HGB  13.0*  13.1*   HCT  38.5*  38.3*   MCV  91  90   MCH  30.8  30.9   MCHC  33.8  34.2   RDW  14.0  13.8   PLT  146*  166       Last Basic Metabolic Panel:  Recent Labs   Lab Test  06/04/18   0655  05/25/18   0607   NA  144  143   POTASSIUM  3.8  3.9   CHLORIDE  110*  108   EDMUNDO  8.3*  8.5   CO2  29  29   BUN  23  16   CR  0.93  1.00   GLC  120*  116*       TSH   Date Value Ref Range Status   04/29/2018 2.33 0.40 - 4.00 mU/L Final       Lab Results   Component Value Date    A1C 7.0 04/29/2018     Lab Results   Component Value Date    INR 1.03 04/29/2018         Assessment/Plan:     Fall, subsequent encounter  Cerebrovascular accident (CVA), unspecified mechanism (H)  Generalized muscle weakness   Urinary retention    Referral to therapies due to deconditioning   Restorative walking program with staff  Encourage use of walker,   Still pending [place] application so can transfer to [facility]     Vitals stable, hr managed, remains on metoprolol for Afib  Remains on Eliquis for CVA, no complications noted    Recently seen by urology with no new orders, reports post void residual ok  Will monitor, patient states voiding fine , continue flomax    Electronically signed by  GREGORY Flanagan CNP

## 2018-07-30 ENCOUNTER — DISCHARGE SUMMARY NURSING HOME (OUTPATIENT)
Dept: GERIATRICS | Facility: CLINIC | Age: 83
End: 2018-07-30
Payer: COMMERCIAL

## 2018-07-30 VITALS
TEMPERATURE: 98.5 F | WEIGHT: 150 LBS | SYSTOLIC BLOOD PRESSURE: 100 MMHG | DIASTOLIC BLOOD PRESSURE: 56 MMHG | RESPIRATION RATE: 16 BRPM | HEART RATE: 69 BPM | OXYGEN SATURATION: 97 % | HEIGHT: 70 IN | BODY MASS INDEX: 21.47 KG/M2

## 2018-07-30 DIAGNOSIS — I63.9 CEREBROVASCULAR ACCIDENT (CVA), UNSPECIFIED MECHANISM (H): ICD-10-CM

## 2018-07-30 DIAGNOSIS — I48.91 ATRIAL FIBRILLATION, UNSPECIFIED TYPE (H): ICD-10-CM

## 2018-07-30 DIAGNOSIS — I10 HYPERTENSION, UNSPECIFIED TYPE: ICD-10-CM

## 2018-07-30 PROCEDURE — 99316 NF DSCHRG MGMT 30 MIN+: CPT | Performed by: NURSE PRACTITIONER

## 2018-07-30 NOTE — LETTER
7/30/2018        RE: Angelito Castellanos  [address]  [facility] [address]          [place] [facility] DISCHARGE SUMMARY    PATIENT'S NAME: Angelito Castellanos  YOB: 1925  MEDICAL RECORD NUMBER:  [id number]    PRIMARY CARE PROVIDER AND CLINIC RESPONSIBLE AFTER TRANSFER: [facility]ardo [facility] [address] / [facility] [address]     CODE STATUS/ADVANCE DIRECTIVES DISCUSSION:   DNR / DNI     No Known Allergies    TRANSFERRING PROVIDERS: GREGORY Flanagan CNP; Felipe Maher MD  DATE OF SNF ADMISSION:  May / 22 / 2018  DATE OF SNF (anticipated) DISCHARGE: August / 01 / 2018  DISCHARGE DISPOSITION: Non-[facility] Provider   Nursing Facility: [facility] stay 5/3/18 to 5/22/18.     Condition on Discharge:  Stable.  Function/ Cognitive Scores/     Equipment:   Walking 300 feet with fww, sba   Stairs: sba-cga, 12 stairs   STS: sba   Transfers:s ba   ST   Mod impairment; rec total assist with IADLS.   Will move to memory care at [facility] when m.a. Opens.  Needs cga for ADLs, cues, sequencing   SLUMS: 9/30   CPT: 4.5   Recommend 24 hour supervision, assist with meals, finance, meds      DISCHARGE DIAGNOSIS:   1. Cerebrovascular accident (CVA), unspecified mechanism (H)    2. Hypertension, unspecified type    3. Atrial fibrillation, unspecified type (H)        HPI Nursing Facility Course:    HPI information obtained from: facility chart records, facility staff, patient report and [facility] chart review.    Patient Angelito Castellanos is a 89 yr old male admitted to [facility] s/p hospitalization [facility] 5/3-5/22/18 for acute ischemic stroke with history chronic Afib, pacemaker, hypertension & urinary retention    Current issues are:        Acute ischemic stroke with large vessel occlusion   CT Head 4/30: Evolving ischemic infarcts in the right and left middle cerebral artery territories.Diffuse cerebral volume loss and  cerebral white matter changes consistent with chronic small vessel ischemic disease   Presented with left hemiparesis, dysarthria & dysphagia   Now on regular diet, tolerating with no complications  Now on Apixaban, had been on aspirin prior, tolerating with no complications  On lipitor  follow up neurology ([facility]) 10/18  Progressing in therapies, completed therapies on TCU & plan for homecare therapies  Restorative nursing program, has red tag on walker at this time with assist with ambulation  hypertension, Afib  On Metoprolol  On Apixaban, has PPM  Vitals stable, hr managed  Not appear fluid up  Follow up cardiologist   Urinary retention  On flomax & finasteride, has urinary incontinence; negative UC  Checking post void residual, patient denies urinary retention or signs and symptoms urinary tract infection  Does retain urine at times ~100-250cc; patient does not want catheter in and desires to allow some retention with plan follow up urologist  follow up urology 7/12/18  Toilet program    Constipation  On senna s , hold for loose stools. Having reg BMs    MEDICAL NECESSITY STATEMENT FOR DME    DME:  WHEELCHAIR:  (TYPE, CUSHION)   Standard foot rest : yes   Elevating leg rest : yes   Dose patient use oxygen: no   Able to propel w/c: yes & caregiver will be able to at assist with [facility]    Mobility related ADL that are affected in the home and wheelchair assist with activities of daily living    The wheelchair is suitable and necessary for use in the patient's home. Home/rooms can accommodate the wheelchair.     Reason why a cane or walker will not meet the patient's needs is due to activity intolerance   The patient has expressed willingness to use the wheelchair in the home and    does have the physical and cognitive ability to maneuver the equipment or has a    Caregiver who is available, willing, and able to provide assistance in the home    With the wheelchair.     VITAL SIGNS:  Vitals: /56  Pulse 69  Temp  "98.5  F (36.9  C)  Resp 16  Ht 5' 10\" (1.778 m)  Wt 150 lb (68 kg)  SpO2 97%  BMI 21.52 kg/m2  BMI= Body mass index is 21.52 kg/(m^2).       MEDICAL NECESSITY STATEMENT (describe reason to support DME here including length of need)  Weakness, s/p stroke  Has left sided weakness and needs wheelchair due to impaired mobility    ELECTRONICALLY SIGNED BY PRIYANK CERTIFIED PROVIDER:  GREGORY Flanagan CNP   NPI: [id number]  [facility] [facility]  [address] [place] [address], [address]  [place] [address]      Advanced care planning  Reviewed goals & wishes & patient desires to be DNR/DNI    CODE STATUS/ADVANCE DIRECTIVES DISCUSSION:   DNR / DNI  Patient's living condition: lives with adult children      PAST MEDICAL HISTORY:  has a past medical history of Chronic atrial fibrillation (H); [place] (hard of hearing); and Prediabetes.  DISCHARGE MEDICATIONS:  Current Outpatient Prescriptions   Medication Sig Dispense Refill     apixaban ANTICOAGULANT (ELIQUIS) 5 MG tablet Take 1 tablet (5 mg) by mouth 2 times daily       atorvastatin (LIPITOR) 20 MG tablet Take 1 tablet (20 mg) by mouth daily 90 tablet 1     finasteride (PROSCAR) 5 MG tablet Take 1 tablet (5 mg) by mouth daily 30 tablet      metoprolol tartrate (LOPRESSOR) 25 MG tablet Take 0.5 tablets (12.5 mg) by mouth 2 times daily 60 tablet      senna-docusate (SENOKOT-S;PERICOLACE) 8.6-50 MG per tablet Take 2 tablets by mouth 2 times daily And 2 tabs bid prn       tamsulosin (FLOMAX) 0.4 MG capsule Take 0.8 mg by mouth every evening         MEDICATION CHANGES/RATIONALE:   Controlled medications sent with patient:   not applicable/none     ROS:    10 point ROS of systems including Constitutional, Eyes, Respiratory, Cardiovascular, Gastroenterology, Genitourinary, Integumentary, Muscularskeletal, Psychiatric were all negative except for pertinent positives noted in my HPI.    Physical Exam:   Vitals: /56  Pulse 69  Temp 98.5  F (36.9  C)  Resp 16  Ht 5' 10\" " (1.778 m)  Wt 150 lb (68 kg)  SpO2 97%  BMI 21.52 kg/m2  BMI= Body mass index is 21.52 kg/(m^2).    GENERAL APPEARANCE:  Alert, in no distress  ENT:  Mouth and posterior oropharynx normal, moist mucous membranes, hard of hearing   EYES:  EOM, conjunctivae, lids, pupils and irises normal  NECK:  No adenopathy,masses or thyromegaly  RESP:  respiratory effort and palpation of chest normal, lungs clear to auscultation , no respiratory distress  CV:  Palpation and auscultation of heart done , regular rate and rhythm, no murmur, rub, or gallop  ABDOMEN:  normal bowel sounds, soft, nontender, no hepatosplenomegaly or other masses  M/S:   Gait and station normal, walking with walker  SKIN:  Inspection of skin and subcutaneous tissue baseline  NEURO:   Cranial nerves 2-12 are normal tested and grossly at patient's baseline  PSYCH:  oriented X 3    BP Readings from Last 3 Encounters:   07/30/18 100/56   07/18/18 108/67   07/13/18 112/66     Pulse Readings from Last 4 Encounters:   07/30/18 69   07/18/18 76   07/13/18 68   06/27/18 63     Wt Readings from Last 5 Encounters:   07/30/18 150 lb (68 kg)   07/18/18 149 lb 12.8 oz (67.9 kg)   07/13/18 149 lb 12.8 oz (67.9 kg)   06/27/18 154 lb (69.9 kg)   06/25/18 152 lb 11.2 oz (69.3 kg)       DISCHARGE PLAN:  Occupational Therapy, Physical Therapy, Registered Nurse and Home Health Aide  Patient instructed to follow-up with:  PCP in 7 days      [facility] scheduled appointments:  Future Appointments  Date Time Provider Department Center   10/30/2018 1:20 PM Phu Kerr MD [facility]       MTM referral needed and placed by this provider: No      SNF labs   CBC RESULTS:   Recent Labs   Lab Test  06/04/18   0655  05/25/18   0607   WBC  6.0  6.0   RBC  4.22*  4.24*   HGB  13.0*  13.1*   HCT  38.5*  38.3*   MCV  91  90   MCH  30.8  30.9   MCHC  33.8  34.2   RDW  14.0  13.8   PLT  146*  166       Last Basic Metabolic Panel:  Recent Labs   Lab Test  06/04/18    0655  05/25/18   0607   NA  144  143   POTASSIUM  3.8  3.9   CHLORIDE  110*  108   EDMUNDO  8.3*  8.5   CO2  29  29   BUN  23  16   CR  0.93  1.00   GLC  120*  116*     TSH   Date Value Ref Range Status   04/29/2018 2.33 0.40 - 4.00 mU/L Final       Lab Results   Component Value Date    A1C 7.0 04/29/2018               TOTAL DISCHARGE TIME:   Greater than 30 minutes  Electronically signed by:  GREGORY Flanagan CNP      Sincerely,        GREGORY Flanagan CNP

## 2018-11-30 NOTE — PLAN OF CARE
Problem: Patient Care Overview  Goal: Plan of Care/Patient Progress Review  Pt with declining performance today, needing physical assist to advance LLE during gait. Please continue to monitor status.        no

## 2019-01-01 ENCOUNTER — DOCUMENTATION ONLY (OUTPATIENT)
Dept: CARE COORDINATION | Facility: CLINIC | Age: 84
End: 2019-01-01

## 2019-12-14 NOTE — PROGRESS NOTES
Dear Dr. Tripp    Medicare Home Health regulations requires [facility] to notify the Physician when the plan for visits has been altered.  We have provided fewer visits than ordered.  We are notifying you of a Missed Visit.    Angelito Castellanos; MRN [id number]  Missed Visit  Is SN RV  Dates of missed services  12/13/2019  Reason: Patient cancelled   Sincerely [facility]  Aislinn Cassidy  [phone]

## 2020-01-01 ENCOUNTER — HOSPITAL LABORATORY (OUTPATIENT)
Dept: OTHER | Facility: CLINIC | Age: 85
End: 2020-01-01

## 2020-01-01 ENCOUNTER — DOCUMENTATION ONLY (OUTPATIENT)
Dept: CARE COORDINATION | Facility: CLINIC | Age: 85
End: 2020-01-01

## 2020-01-01 ENCOUNTER — DOCUMENTATION ONLY (OUTPATIENT)
Dept: OTHER | Facility: CLINIC | Age: 85
End: 2020-01-01

## 2020-01-01 ENCOUNTER — NURSING HOME VISIT (OUTPATIENT)
Dept: GERIATRICS | Facility: CLINIC | Age: 85
End: 2020-01-01
Payer: MEDICARE

## 2020-01-01 VITALS
OXYGEN SATURATION: 90 % | RESPIRATION RATE: 16 BRPM | SYSTOLIC BLOOD PRESSURE: 103 MMHG | DIASTOLIC BLOOD PRESSURE: 69 MMHG | WEIGHT: 126.7 LBS | HEIGHT: 67 IN | HEART RATE: 103 BPM | BODY MASS INDEX: 19.89 KG/M2 | TEMPERATURE: 98.9 F

## 2020-01-01 DIAGNOSIS — I48.91 ATRIAL FIBRILLATION, UNSPECIFIED TYPE (H): ICD-10-CM

## 2020-01-01 DIAGNOSIS — N30.00 ACUTE CYSTITIS WITHOUT HEMATURIA: ICD-10-CM

## 2020-01-01 DIAGNOSIS — I10 HYPERTENSION, UNSPECIFIED TYPE: Primary | ICD-10-CM

## 2020-01-01 LAB
ANION GAP SERPL CALCULATED.3IONS-SCNC: 10 MMOL/L (ref 3–14)
BUN SERPL-MCNC: 53 MG/DL (ref 7–30)
CALCIUM SERPL-MCNC: 8.9 MG/DL (ref 8.5–10.1)
CHLORIDE SERPL-SCNC: 113 MMOL/L (ref 94–109)
CO2 SERPL-SCNC: 22 MMOL/L (ref 20–32)
CREAT SERPL-MCNC: 1.1 MG/DL (ref 0.66–1.25)
ERYTHROCYTE [DISTWIDTH] IN BLOOD BY AUTOMATED COUNT: 15.6 % (ref 10–15)
GFR SERPL CREATININE-BSD FRML MDRD: 57 ML/MIN/{1.73_M2}
GLUCOSE SERPL-MCNC: 141 MG/DL (ref 70–99)
HCT VFR BLD AUTO: 44.1 % (ref 40–53)
HGB BLD-MCNC: 14.3 G/DL (ref 13.3–17.7)
MCH RBC QN AUTO: 30.4 PG (ref 26.5–33)
MCHC RBC AUTO-ENTMCNC: 32.4 G/DL (ref 31.5–36.5)
MCV RBC AUTO: 94 FL (ref 78–100)
PLATELET # BLD AUTO: 353 10E9/L (ref 150–450)
POTASSIUM SERPL-SCNC: 4.5 MMOL/L (ref 3.4–5.3)
RBC # BLD AUTO: 4.71 10E12/L (ref 4.4–5.9)
SARS-COV-2 RNA SPEC QL NAA+PROBE: NOT DETECTED
SODIUM SERPL-SCNC: 145 MMOL/L (ref 133–144)
SPECIMEN SOURCE: NORMAL
WBC # BLD AUTO: 9 10E9/L (ref 4–11)

## 2020-01-01 PROCEDURE — 99310 SBSQ NF CARE HIGH MDM 45: CPT | Performed by: NURSE PRACTITIONER

## 2020-01-01 RX ORDER — PETROLATUM 430 MG/G
OINTMENT TOPICAL PRN
COMMUNITY

## 2020-01-01 RX ORDER — BISACODYL 10 MG
10 SUPPOSITORY, RECTAL RECTAL DAILY PRN
COMMUNITY

## 2020-01-01 RX ORDER — DOCUSATE SODIUM 100 MG/1
100 CAPSULE, LIQUID FILLED ORAL 2 TIMES DAILY PRN
COMMUNITY

## 2020-01-01 RX ORDER — ACETAMINOPHEN 325 MG/1
650 TABLET ORAL EVERY 4 HOURS PRN
COMMUNITY

## 2020-01-01 RX ORDER — SULFAMETHOXAZOLE/TRIMETHOPRIM 800-160 MG
1 TABLET ORAL 2 TIMES DAILY
COMMUNITY
Start: 2020-01-01 | End: 2020-01-01

## 2020-01-01 RX ORDER — AMOXICILLIN 500 MG/1
500 CAPSULE ORAL EVERY 8 HOURS
COMMUNITY
Start: 2020-01-01 | End: 2020-01-01

## 2020-01-01 ASSESSMENT — MIFFLIN-ST. JEOR: SCORE: 1173.34

## 2020-11-11 NOTE — PROGRESS NOTES
Ai, thank you for the referral for Angelito. I spoke with his daughter understand [facility] We will close his referral at this time.     Please let us know if there is anything more we can do to assist. Thank you,    Mable Manrique RN BSN [facility]  Hospice Referral Specialist  [facility]    [phone]  [email or web address]

## 2020-11-11 NOTE — PROGRESS NOTES
[place] [facility]  PRIMARY CARE PROVIDER AND CLINIC:  Gerardo Adhikari MD, [facility] [address] [address] [facility] [address] / [facility] [address]  Chief Complaint   Patient presents with     [facility] Care     [place] Medical Record Number:  [id number]  Place of Service where encounter took place:  [facility] [[id number]]    Angelito Castellanos  is a 89 year old  (12/23/1925), admitted to the above facility from  [facility] . Hospital stay 10/30/20 through 11/9/20..  Admitted to this facility for  rehab, medical management and nursing care.    HPI:    HPI information obtained from: facility chart records, facility staff and patient report.   Brief Summary of Hospital Course:   Updates on Status Since Skilled nursing Admission:     Patient admitted to [facility] for rehabilitation s/p hospitalization [facility] 10/30-11/9/20 for urosepsis and encephalopathy  Patient participating in therapies, slow progress and needing to be fed at meals. Has been needing oxygen while at TCU. Negative for testing for COVID19  Reviewed goals of care and patient status today with daughter Ethan. Ethan wishes to pursue hospice and potentially take patient home when he is near end of life so family can be near him.    Patient living at [facility] and has had progressive decline  PMHx has chronic bliss urinary catheter, ischemic stroke with history chronic Afib, pacemaker, hypertension & urinary retention    TODAY  Patient has no complaints  Denies pain  Participating in therapies   Eating only few bites; needs to be fed      Reviewed CODE status with daughter Ethan and patient is DNR/DNI. She does not want aggressive treatment for patient  Hospice referral made    CODE STATUS/ADVANCE DIRECTIVES DISCUSSION:   DNR / DNI  Patient's living condition: lives in an assisted living facility  ALLERGIES: Patient has no known allergies.  PAST MEDICAL HISTORY:  has a past  "medical history of Chronic atrial fibrillation (H), [place] (hard of hearing), and Prediabetes.  PAST SURGICAL HISTORY:   has a past surgical history that includes Cardiac surgery; appendectomy; and nasal/sinus polypectomy.  FAMILY HISTORY: family history includes Cerebrovascular Disease in an other family member; Coronary Artery Disease in his father.  SOCIAL HISTORY:   reports that he has quit smoking. He does not have any smokeless tobacco history on file. He reports current alcohol use. He reports that he does not use drugs.    Post Discharge Medication Reconciliation Status: discharge medications reconciled and changed, per note/orders    Current Outpatient Medications   Medication Sig Dispense Refill     acetaminophen (TYLENOL) 325 MG tablet Take 650 mg by mouth every 4 hours as needed for mild pain       amoxicillin (AMOXIL) 500 MG capsule Take 500 mg by mouth every 8 hours       apixaban ANTICOAGULANT (ELIQUIS) 5 MG tablet Take 1 tablet (5 mg) by mouth 2 times daily       atorvastatin (LIPITOR) 20 MG tablet Take 1 tablet (20 mg) by mouth daily 90 tablet 1     bisacodyl (DULCOLAX) 10 MG suppository Place 10 mg rectally daily as needed for constipation       docusate sodium (COLACE) 100 MG capsule Take 100 mg by mouth 2 times daily as needed for constipation       MELATONIN PO Take 6 mg by mouth nightly as needed       metoprolol tartrate (LOPRESSOR) 25 MG tablet Take 0.5 tablets (12.5 mg) by mouth 2 times daily 60 tablet      Skin Protectants, Misc. (ALOE VESTA PROTECTIVE) ointment Apply topically as needed       sulfamethoxazole-trimethoprim (BACTRIM DS) 800-160 MG tablet Take 1 tablet by mouth 2 times daily       tamsulosin (FLOMAX) 0.4 MG capsule Take 0.4 mg by mouth every evening          ROS:  Limited secondary to cognitive impairment but today pt reports he feels fine    Vitals:  /69   Pulse 103   Temp 98.9  F (37.2  C)   Resp 16   Ht 1.702 m (5' 7\")   Wt 57.5 kg (126 lb 11.2 oz)   SpO2 90%   " BMI 19.84 kg/m    Exam:  GENERAL APPEARANCE:  Alert, in no distress, thin  ENT:  Mouth and posterior oropharynx normal, moist mucous membranes, uses pocket talker for communication  EYES:  EOM, conjunctivae, lids, pupils and irises normal  NECK:  No adenopathy,masses or thyromegaly  RESP:  no respiratory distress  M/S:   sitting in WC  SKIN:  Inspection of skin and subcutaneous tissue baseline  NEURO:   Slow response to questions, needs help feeding  PSYCH:  memory impaired     Lab/Diagnostic data:  Labs done in SNF are in [place] EPIC. Please refer to them using [employer]/Care Everywhere.    ASSESSMENT/PLAN:    History ischemic stroke with large vessel occlusion   Has weakness and needing help with activities of daily living and mobility; had been living in [facility]   Followed by speech therapy, on dysphagia, had been on mildly thicken prior  Has dentures  On Lipitor, likely not benefit given goals of care  hypertension, Afib  Per hospital note can tolerate 110-120  Continue Metoprolol low dose, hr managed, has PPM  Continue Eliquis, No signs and symptoms bleeding. Likely stop if patient continues to decline  Vitals stable, hr managed  Not appear fluid up    Urinary retention  Continue bliss catheter cares as ordered  Remains on Flomax, unclear if benefit given has chronic bliss catheter  Urosepsis  UC grown enterococcus faecalis and Enterobacter cloacae   Continue Amoxicillin and Bactrim DS course, monitor       Reviewed CODE status with daughter Ethan and patient is DNR/DNI. She does not want aggressive treatment for patient  Hospice referral made        Total time spent with patient visit at the skilled nursing facility was 35 min including patient visit, review of past records and phone call to patient contact. Greater than 50% of total time spent with counseling and coordinating care due to discussion with daughter Ethan on patient status, goals of care and chronic disease management; discussion with   regarding hospice referral and potential discharge planning.     Electronically signed by:  GREGORY Flanagan CNP

## 2020-11-11 NOTE — LETTER
11/11/2020        RE: Angelito Castellanos  [address]  [facility] [address]        [place] [facility]  PRIMARY CARE PROVIDER AND CLINIC:  Gerardo Adhikari MD, [facility] [address] [facility] [address] / [facility] [address]  Chief Complaint   Patient presents with     [facility] Care     [place] Medical Record Number:  [id number]  Place of Service where encounter took place:  [facility] [[id number]]    Angelito Castellanos  is a 89 year old  (12/23/1925), admitted to the above facility from  [facility] . Hospital stay 10/30/20 through 11/9/20..  Admitted to this facility for  rehab, medical management and nursing care.    HPI:    HPI information obtained from: facility chart records, facility staff and patient report.   Brief Summary of Hospital Course:   Updates on Status Since Skilled nursing Admission:     Patient [facility] for urosepsis and encephalopathy  Patient participating in therapies, slow progress and needing to be fed at meals. Has been needing oxygen while at TCU. Negative for testing for COVID19  Reviewed goals of care and patient status today with daughter Ethan. Ethan wishes to pursue hospice and potentially take patient home when he is near end of life so family can be near him.    Patient living at [facility] and has had progressive decline  PMHx has chronic bliss urinary catheter, ischemic stroke with history chronic Afib, pacemaker, hypertension & urinary retention    TODAY  Patient has no complaints  Denies pain  Participating in therapies   Eating only few bites; needs to be fed      Reviewed CODE status with daughter Ethan and patient is DNR/DNI. She does not want aggressive treatment for patient  Hospice referral made    CODE STATUS/ADVANCE DIRECTIVES DISCUSSION:   DNR / DNI  Patient's living condition: lives in an assisted living  facility  ALLERGIES: Patient has no known allergies.  PAST MEDICAL HISTORY:  has a past medical history of Chronic atrial fibrillation (H), [place] (hard of hearing), and Prediabetes.  PAST SURGICAL HISTORY:   has a past surgical history that includes Cardiac surgery; appendectomy; and nasal/sinus polypectomy.  FAMILY HISTORY: family history includes Cerebrovascular Disease in an other family member; Coronary Artery Disease in his father.  SOCIAL HISTORY:   reports that he has quit smoking. He does not have any smokeless tobacco history on file. He reports current alcohol use. He reports that he does not use drugs.    Post Discharge Medication Reconciliation Status: discharge medications reconciled and changed, per note/orders    Current Outpatient Medications   Medication Sig Dispense Refill     acetaminophen (TYLENOL) 325 MG tablet Take 650 mg by mouth every 4 hours as needed for mild pain       amoxicillin (AMOXIL) 500 MG capsule Take 500 mg by mouth every 8 hours       apixaban ANTICOAGULANT (ELIQUIS) 5 MG tablet Take 1 tablet (5 mg) by mouth 2 times daily       atorvastatin (LIPITOR) 20 MG tablet Take 1 tablet (20 mg) by mouth daily 90 tablet 1     bisacodyl (DULCOLAX) 10 MG suppository Place 10 mg rectally daily as needed for constipation       docusate sodium (COLACE) 100 MG capsule Take 100 mg by mouth 2 times daily as needed for constipation       MELATONIN PO Take 6 mg by mouth nightly as needed       metoprolol tartrate (LOPRESSOR) 25 MG tablet Take 0.5 tablets (12.5 mg) by mouth 2 times daily 60 tablet      Skin Protectants, Misc. (ALOE VESTA PROTECTIVE) ointment Apply topically as needed       sulfamethoxazole-trimethoprim (BACTRIM DS) 800-160 MG tablet Take 1 tablet by mouth 2 times daily       tamsulosin (FLOMAX) 0.4 MG capsule Take 0.4 mg by mouth every evening          ROS:  Limited secondary to cognitive impairment but today pt reports he feels fine    Vitals:  /69   Pulse 103   Temp 98.9  F  "(37.2  C)   Resp 16   Ht 1.702 m (5' 7\")   Wt 57.5 kg (126 lb 11.2 oz)   SpO2 90%   BMI 19.84 kg/m    Exam:  GENERAL APPEARANCE:  Alert, in no distress, thin  ENT:  Mouth and posterior oropharynx normal, moist mucous membranes, uses pocket talker for communication  EYES:  EOM, conjunctivae, lids, pupils and irises normal  NECK:  No adenopathy,masses or thyromegaly  RESP:  no respiratory distress  M/S:   sitting in WC  SKIN:  Inspection of skin and subcutaneous tissue baseline  NEURO:   Slow response to questions, needs help feeding  PSYCH:  memory impaired     Lab/Diagnostic data:  Labs done in SNF are in [place] EPIC. Please refer to them using [facility]Care Everywhere.    ASSESSMENT/PLAN:    History ischemic stroke with large vessel occlusion   Has weakness and needing help with activities of daily living and mobility; had been living in [facility]   Followed by speech therapy, on dysphagia, had been on mildly thicken prior  Has dentures  On Lipitor, likely not benefit given goals of care  hypertension, Afib  Per hospital note can tolerate 110-120  Continue Metoprolol low dose, hr managed, has PPM  Continue Eliquis, No signs and symptoms bleeding. Likely stop if patient continues to decline  Vitals stable, hr managed  Not appear fluid up    Urinary retention  Continue bliss catheter cares as ordered  Remains on Flomax, unclear if benefit given has chronic bliss catheter  Urosepsis  UC grown enterococcus faecalis and Enterobacter cloacae   Continue Amoxicillin and Bactrim DS course, monitor       Reviewed CODE status with daughter Ethan and patient is DNR/DNI. She does not want aggressive treatment for patient  Hospice referral made        Total time spent with patient visit at the skilled nursing facility was 35 min including patient visit, review of past records and phone call to patient contact. Greater than 50% of total time spent with counseling and coordinating care due to discussion with daughter Ethan on patient " status, goals of care and chronic disease management; discussion with  regarding hospice referral and potential discharge planning.     Electronically signed by:  GREGORY Flanagan CNP                           Sincerely,        GREGORY Flanagan CNP

## 2020-11-19 LAB
SARS-COV-2 RNA SPEC QL NAA+PROBE: NOT DETECTED
SPECIMEN SOURCE: NORMAL

## 2023-05-16 NOTE — PLAN OF CARE
Problem: Patient Care Overview  Goal: Plan of Care/Patient Progress Review  Completed VFSS to see if pt can further advance diet.  Based on eval:pt's swallow functin has improved- now demosntrating a mild- moderate oral pharygneal dysphagia characterized by the following: premature pharygneal entry to the level of the pyriform sinus' with thin liquid trials by teaspoon and by cup rim ( and also a mild delayed swallow initiation)- but despitre this- pt controls the thin liquids material well at the pharygneal level- there was no aspiration or penetration with thin liquid trials. Attempte to trial thin by straw too but pt was not able to slurp upr the liquid into the straw. Pt does have some mild pooling of thin liquids in the valleculae and pyriform sinus following the swallow but with additional swallows this clears to trace amounts. With semi-solid consistency- pt is noted to have prologned mastication and oral prep with the semi- solids and some difficulty with A-P propulsion of bolus- noting some lingual pumping. Pt initially is noted to have mild to moderate pharygneal retention both in the valleculae and pyriform sinus' but his clears to more mild amounts when f/u with extra swallows and when f/u with sips of thin liquids. Recommending that pt continue with DD2 solids but that pt's liquids be advanced to thin liquids. Pt needs to continue to use safe swallow strategies of upright for all po, small bites/sips, alternate solids and liquids, multiple swallows with both liquids and solids, pace self- eat slowly- 1 bite at a time. SLP to continue to follow for instructin on safe swallow strategies, diet tolerance, and readiness for further diet advancment as well as oral pharyngeal excs. SLP will need continued sptx following d/c to TCU as pt's diet remains below baseline       Klisyri Counseling:  I discussed with the patient the risks of Klisyri including but not limited to erythema, scaling, itching, weeping, crusting, and pain.

## 2024-02-26 NOTE — PROGRESS NOTES
[place] [facility] DISCHARGE SUMMARY    PATIENT'S NAME: Angelito Castellanos  YOB: 1925  MEDICAL RECORD NUMBER:  [id number]    PRIMARY CARE PROVIDER AND CLINIC RESPONSIBLE AFTER TRANSFER: Gerardo Adhikari [facility] [address] [facility] [address] / [facility] [id number]     CODE STATUS/ADVANCE DIRECTIVES DISCUSSION:   DNR / DNI     No Known Allergies    TRANSFERRING PROVIDERS: GREGORY Flanagan CNP; Felipe Maher MD  DATE OF SNF ADMISSION:  May / 22 / 2018  DATE OF SNF (anticipated) DISCHARGE: August / 01 / 2018  DISCHARGE DISPOSITION: Non-[facility] Provider   Nursing Facility: [facility] stay 5/3/18 to 5/22/18.     Condition on Discharge:  Stable.  Function/ Cognitive Scores/     Equipment:   Walking 300 feet with fww, sba   Stairs: sba-cga, 12 stairs   STS: sba   Transfers:s ba   ST   Mod impairment; rec total assist with IADLS.   Will move [facility] memory care at [facility] when m.a[facility] Opens.  Needs cga for ADLs, cues, sequencing   SLUMS: 9/30   CPT: 4.5   Recommend 24 hour supervision, assist with meals, finance, meds      DISCHARGE DIAGNOSIS:   1. Cerebrovascular accident (CVA), unspecified mechanism (H)    2. Hypertension, unspecified type    3. Atrial fibrillation, unspecified type (H)        HPI Nursing Facility Course:    HPI information obtained from: facility chart records, facility staff, patient report and [facility] chart review.    Patient Angelito Castellanos is a 89 yr old male admitted to [facility] s/p hospitalization [facility] 5/3-5/22/18 for acute ischemic stroke with history chronic Afib, pacemaker, hypertension & urinary retention    Current issues are:        Acute ischemic stroke with large vessel occlusion   CT Head 4/30: Evolving ischemic infarcts in the right and left middle cerebral artery territories.Diffuse cerebral volume loss and cerebral white matter changes consistent with chronic small vessel ischemic disease  "  Presented with left hemiparesis, dysarthria & dysphagia   Now on regular diet, tolerating with no complications  Now on Apixaban, had been on aspirin prior, tolerating with no complications  On lipitor  follow up neurology ([facility]) 10/18  Progressing in therapies, completed therapies on TCU & plan for homecare therapies  Restorative nursing program, has red tag on walker at this time with assist with ambulation  hypertension, Afib  On Metoprolol  On Apixaban, has PPM  Vitals stable, hr managed  Not appear fluid up  Follow up cardiologist   Urinary retention  On flomax & finasteride, has urinary incontinence; negative UC  Checking post void residual, patient denies urinary retention or signs and symptoms urinary tract infection  Does retain urine at times ~100-250cc; patient does not want catheter in and desires to allow some retention with plan follow up urologist  follow up urology 7/12/18  Toilet program    Constipation  On senna s , hold for loose stools. Having reg BMs    MEDICAL NECESSITY STATEMENT FOR DME    DME:  WHEELCHAIR:  (TYPE, CUSHION)   Standard foot rest : yes   Elevating leg rest : yes   Dose patient use oxygen: no   Able to propel w/c: yes & caregiver will be able to at assist with USP    Mobility related ADL that are affected in the home and wheelchair assist with activities of daily living    The wheelchair is suitable and necessary for use in the patient's home. Home/rooms can accommodate the wheelchair.     Reason why a cane or walker will not meet the patient's needs is due to activity intolerance   The patient has expressed willingness to use the wheelchair in the home and    does have the physical and cognitive ability to maneuver the equipment or has a    Caregiver who is available, willing, and able to provide assistance in the home    With the wheelchair.     VITAL SIGNS:  Vitals: /56  Pulse 69  Temp 98.5  F (36.9  C)  Resp 16  Ht 5' 10\" (1.778 m)  Wt 150 lb (68 kg)  SpO2 97%  " "BMI 21.52 kg/m2  BMI= Body mass index is 21.52 kg/(m^2).       MEDICAL NECESSITY STATEMENT (describe reason to support DME here including length of need)  Weakness, s/p stroke  Has left sided weakness and needs wheelchair due to impaired mobility    ELECTRONICALLY SIGNED BY PRIYANK CERTIFIED PROVIDER:  GREGORY Flanagan CNP   NPI: [id number]  [facility]  [address] [place] [address], [address]  [place] [address]      Advanced care planning  Reviewed goals & wishes & patient desires to be DNR/DNI    CODE STATUS/ADVANCE DIRECTIVES DISCUSSION:   DNR / DNI  Patient's living condition: lives with adult children      PAST MEDICAL HISTORY:  has a past medical history of Chronic atrial fibrillation (H); [place] (hard of hearing); and Prediabetes.  DISCHARGE MEDICATIONS:  Current Outpatient Prescriptions   Medication Sig Dispense Refill     apixaban ANTICOAGULANT (ELIQUIS) 5 MG tablet Take 1 tablet (5 mg) by mouth 2 times daily       atorvastatin (LIPITOR) 20 MG tablet Take 1 tablet (20 mg) by mouth daily 90 tablet 1     finasteride (PROSCAR) 5 MG tablet Take 1 tablet (5 mg) by mouth daily 30 tablet      metoprolol tartrate (LOPRESSOR) 25 MG tablet Take 0.5 tablets (12.5 mg) by mouth 2 times daily 60 tablet      senna-docusate (SENOKOT-S;PERICOLACE) 8.6-50 MG per tablet Take 2 tablets by mouth 2 times daily And 2 tabs bid prn       tamsulosin (FLOMAX) 0.4 MG capsule Take 0.8 mg by mouth every evening         MEDICATION CHANGES/RATIONALE:   Controlled medications sent with patient:   not applicable/none     ROS:    10 point ROS of systems including Constitutional, Eyes, Respiratory, Cardiovascular, Gastroenterology, Genitourinary, Integumentary, Muscularskeletal, Psychiatric were all negative except for pertinent positives noted in my HPI.    Physical Exam:   Vitals: /56  Pulse 69  Temp 98.5  F (36.9  C)  Resp 16  Ht 5' 10\" (1.778 m)  Wt 150 lb (68 kg)  SpO2 97%  BMI 21.52 kg/m2  BMI= Body mass index " is 21.52 kg/(m^2).    GENERAL APPEARANCE:  Alert, in no distress  ENT:  Mouth and posterior oropharynx normal, moist mucous membranes, hard of hearing   EYES:  EOM, conjunctivae, lids, pupils and irises normal  NECK:  No adenopathy,masses or thyromegaly  RESP:  respiratory effort and palpation of chest normal, lungs clear to auscultation , no respiratory distress  CV:  Palpation and auscultation of heart done , regular rate and rhythm, no murmur, rub, or gallop  ABDOMEN:  normal bowel sounds, soft, nontender, no hepatosplenomegaly or other masses  M/S:   Gait and station normal, walking with walker  SKIN:  Inspection of skin and subcutaneous tissue baseline  NEURO:   Cranial nerves 2-12 are normal tested and grossly at patient's baseline  PSYCH:  oriented X 3    BP Readings from Last 3 Encounters:   07/30/18 100/56   07/18/18 108/67   07/13/18 112/66     Pulse Readings from Last 4 Encounters:   07/30/18 69   07/18/18 76   07/13/18 68   06/27/18 63     Wt Readings from Last 5 Encounters:   07/30/18 150 lb (68 kg)   07/18/18 149 lb 12.8 oz (67.9 kg)   07/13/18 149 lb 12.8 oz (67.9 kg)   06/27/18 154 lb (69.9 kg)   06/25/18 152 lb 11.2 oz (69.3 kg)       DISCHARGE PLAN:  Occupational Therapy, Physical Therapy, Registered Nurse and Home Health Aide  Patient instructed to follow-up with:  PCP in 7 days      [facility] scheduled appointments:  Future Appointments  Date Time Provider Department Center   10/30/2018 1:20 PM Phu Kerr MD [facility]       MTM referral needed and placed by this provider: No      SNF labs   CBC RESULTS:   Recent Labs   Lab Test  06/04/18   0655  05/25/18   0607   WBC  6.0  6.0   RBC  4.22*  4.24*   HGB  13.0*  13.1*   HCT  38.5*  38.3*   MCV  91  90   MCH  30.8  30.9   MCHC  33.8  34.2   RDW  14.0  13.8   PLT  146*  166       Last Basic Metabolic Panel:  Recent Labs   Lab Test  06/04/18   0655  05/25/18   0607   NA  144  143   POTASSIUM  3.8  3.9   CHLORIDE  110*  108    EDMUNDO  8.3*  8.5   CO2  29  29   BUN  23  16   CR  0.93  1.00   GLC  120*  116*     TSH   Date Value Ref Range Status   04/29/2018 2.33 0.40 - 4.00 mU/L Final       Lab Results   Component Value Date    A1C 7.0 04/29/2018               TOTAL DISCHARGE TIME:   Greater than 30 minutes  Electronically signed by:  GREGORY Flanagan CNP   Patient